# Patient Record
Sex: MALE | Race: WHITE | Employment: OTHER | URBAN - METROPOLITAN AREA
[De-identification: names, ages, dates, MRNs, and addresses within clinical notes are randomized per-mention and may not be internally consistent; named-entity substitution may affect disease eponyms.]

---

## 2017-04-01 ENCOUNTER — APPOINTMENT (OUTPATIENT)
Dept: GENERAL RADIOLOGY | Age: 77
End: 2017-04-01
Attending: STUDENT IN AN ORGANIZED HEALTH CARE EDUCATION/TRAINING PROGRAM
Payer: MEDICARE

## 2017-04-01 ENCOUNTER — HOSPITAL ENCOUNTER (EMERGENCY)
Age: 77
Discharge: HOME | End: 2017-04-01
Attending: STUDENT IN AN ORGANIZED HEALTH CARE EDUCATION/TRAINING PROGRAM
Payer: MEDICARE

## 2017-04-01 VITALS
BODY MASS INDEX: 16.3 KG/M2 | HEART RATE: 54 BPM | OXYGEN SATURATION: 98 % | TEMPERATURE: 98.7 F | HEIGHT: 73 IN | DIASTOLIC BLOOD PRESSURE: 49 MMHG | WEIGHT: 123 LBS | SYSTOLIC BLOOD PRESSURE: 123 MMHG | RESPIRATION RATE: 14 BRPM

## 2017-04-01 DIAGNOSIS — R07.9 CHEST PAIN, UNSPECIFIED TYPE: Primary | ICD-10-CM

## 2017-04-01 LAB
ALBUMIN SERPL BCP-MCNC: 3.5 G/DL (ref 3.5–5)
ALBUMIN/GLOB SERPL: 0.9 {RATIO} (ref 1.1–2.2)
ALP SERPL-CCNC: 59 U/L (ref 45–117)
ALT SERPL-CCNC: 18 U/L (ref 12–78)
ANION GAP BLD CALC-SCNC: 8 MMOL/L (ref 5–15)
AST SERPL W P-5'-P-CCNC: 15 U/L (ref 15–37)
ATRIAL RATE: 241 BPM
BASOPHILS # BLD AUTO: 0 K/UL (ref 0–0.1)
BASOPHILS # BLD: 0 % (ref 0–1)
BILIRUB SERPL-MCNC: 1.6 MG/DL (ref 0.2–1)
BUN SERPL-MCNC: 18 MG/DL (ref 6–20)
BUN/CREAT SERPL: 17 (ref 12–20)
CALCIUM SERPL-MCNC: 9.1 MG/DL (ref 8.5–10.1)
CALCULATED R AXIS, ECG10: 35 DEGREES
CALCULATED T AXIS, ECG11: 63 DEGREES
CHLORIDE SERPL-SCNC: 106 MMOL/L (ref 97–108)
CO2 SERPL-SCNC: 30 MMOL/L (ref 21–32)
CREAT SERPL-MCNC: 1.04 MG/DL (ref 0.7–1.3)
DIAGNOSIS, 93000: NORMAL
EOSINOPHIL # BLD: 0 K/UL (ref 0–0.4)
EOSINOPHIL NFR BLD: 0 % (ref 0–7)
ERYTHROCYTE [DISTWIDTH] IN BLOOD BY AUTOMATED COUNT: 14.3 % (ref 11.5–14.5)
GLOBULIN SER CALC-MCNC: 4.1 G/DL (ref 2–4)
GLUCOSE SERPL-MCNC: 100 MG/DL (ref 65–100)
HCT VFR BLD AUTO: 39.6 % (ref 36.6–50.3)
HGB BLD-MCNC: 13.2 G/DL (ref 12.1–17)
LYMPHOCYTES # BLD AUTO: 6 % (ref 12–49)
LYMPHOCYTES # BLD: 0.5 K/UL (ref 0.8–3.5)
MCH RBC QN AUTO: 31.3 PG (ref 26–34)
MCHC RBC AUTO-ENTMCNC: 33.3 G/DL (ref 30–36.5)
MCV RBC AUTO: 93.8 FL (ref 80–99)
MONOCYTES # BLD: 0.6 K/UL (ref 0–1)
MONOCYTES NFR BLD AUTO: 7 % (ref 5–13)
NEUTS BAND NFR BLD MANUAL: 2 %
NEUTS SEG # BLD: 7.5 K/UL (ref 1.8–8)
NEUTS SEG NFR BLD AUTO: 85 % (ref 32–75)
PLATELET # BLD AUTO: 195 K/UL (ref 150–400)
PLATELET COMMENTS,PCOM: ABNORMAL
POTASSIUM SERPL-SCNC: 3.9 MMOL/L (ref 3.5–5.1)
PROT SERPL-MCNC: 7.6 G/DL (ref 6.4–8.2)
Q-T INTERVAL, ECG07: 384 MS
QRS DURATION, ECG06: 94 MS
QTC CALCULATION (BEZET), ECG08: 411 MS
RBC # BLD AUTO: 4.22 M/UL (ref 4.1–5.7)
RBC MORPH BLD: ABNORMAL
RBC MORPH BLD: ABNORMAL
SODIUM SERPL-SCNC: 144 MMOL/L (ref 136–145)
TROPONIN I SERPL-MCNC: <0.04 NG/ML
TROPONIN I SERPL-MCNC: <0.04 NG/ML
VENTRICULAR RATE, ECG03: 69 BPM
WBC # BLD AUTO: 8.6 K/UL (ref 4.1–11.1)

## 2017-04-01 PROCEDURE — 99285 EMERGENCY DEPT VISIT HI MDM: CPT

## 2017-04-01 PROCEDURE — 85025 COMPLETE CBC W/AUTO DIFF WBC: CPT | Performed by: STUDENT IN AN ORGANIZED HEALTH CARE EDUCATION/TRAINING PROGRAM

## 2017-04-01 PROCEDURE — 80053 COMPREHEN METABOLIC PANEL: CPT | Performed by: STUDENT IN AN ORGANIZED HEALTH CARE EDUCATION/TRAINING PROGRAM

## 2017-04-01 PROCEDURE — 84484 ASSAY OF TROPONIN QUANT: CPT | Performed by: STUDENT IN AN ORGANIZED HEALTH CARE EDUCATION/TRAINING PROGRAM

## 2017-04-01 PROCEDURE — 93005 ELECTROCARDIOGRAM TRACING: CPT

## 2017-04-01 PROCEDURE — 36415 COLL VENOUS BLD VENIPUNCTURE: CPT | Performed by: STUDENT IN AN ORGANIZED HEALTH CARE EDUCATION/TRAINING PROGRAM

## 2017-04-01 PROCEDURE — 71010 XR CHEST PORT: CPT

## 2017-04-01 NOTE — ED NOTES
Verbal and bedside report given to [id number] [facility] from Jossy Rice RN using [employer]. Patient resting quietly with no further needs at this time.

## 2017-04-01 NOTE — ED NOTES
Assumed care of patient. Denies CP currently. Reports \"my head feels like its stretching. A really bad headache. \"  Also reports epigastric \"burning\" and pain \"in my tailbone. \" Pt's head is diaphoretic. Female  at bedside. Call bell in reach. Warm blanket provided. Railings up x 2.   On CM x3

## 2017-04-01 NOTE — DISCHARGE INSTRUCTIONS
We hope that we have addressed all of your medical concerns. The examination and treatment you received in the Emergency Department were for an emergent problem and were not intended as complete care. It is important that you follow up with your healthcare provider(s) for ongoing care. If your symptoms worsen or do not improve as expected, and you are unable to reach your usual health care provider(s), you should return to the Emergency Department. Today's healthcare is undergoing tremendous change, and patient satisfaction surveys are one of the many tools to assess the quality of medical care. You may receive a survey from the [employer] regarding your experience in the Emergency Department. I hope that your experience has been completely positive, particularly the medical care that I provided. As such, please participate in the survey; anything less than excellent does not meet my expectations or intentions. Critical access hospital[facility] and 8 [facility] participate in nationally recognized quality of care measures. If your blood pressure is greater than 120/80, as reported below, we urge that you seek medical care to address the potential of high blood pressure, commonly known as hypertension. Hypertension can be hereditary or can be caused by certain medical conditions, pain, stress, or \"white coat syndrome. \"       Please make an appointment with your health care provider(s) for follow up of your Emergency Department visit. VITALS:   Patient Vitals for the past 8 hrs:   Temp Pulse Resp BP SpO2   04/01/17 0700 - 66 17 157/72 100 %   04/01/17 0600 - (!) 56 15 146/72 97 %   04/01/17 0453 98.7 °F (37.1 °C) 71 15 158/61 97 %          Thank you for allowing us to provide you with medical care today. We realize that you have many choices for your emergency care needs. Please choose us in the future for any continued health care needs.       Regards, Sandra Braxton [place], [address] [place] [address] 20.   Office: [phone]            Recent Results (from the past 24 hour(s))   EKG, 12 LEAD, INITIAL    Collection Time: 04/01/17  4:55 AM   Result Value Ref Range    Ventricular Rate 69 BPM    Atrial Rate 241 BPM    QRS Duration 94 ms    Q-T Interval 384 ms    QTC Calculation (Bezet) 411 ms    Calculated R Axis 35 degrees    Calculated T Axis 63 degrees    Diagnosis       Accelerated Junctional rhythm  Voltage criteria for left ventricular hypertrophy  When compared with ECG of 07-NOV-2016 17:32,  Junctional rhythm has replaced Sinus rhythm     CBC WITH AUTOMATED DIFF    Collection Time: 04/01/17  5:01 AM   Result Value Ref Range    WBC 8.6 4.1 - 11.1 K/uL    RBC 4.22 4.10 - 5.70 M/uL    HGB 13.2 12.1 - 17.0 g/dL    HCT 39.6 36.6 - 50.3 %    MCV 93.8 80.0 - 99.0 FL    MCH 31.3 26.0 - 34.0 PG    MCHC 33.3 30.0 - 36.5 g/dL    RDW 14.3 11.5 - 14.5 %    PLATELET 044 012 - 591 K/uL    NEUTROPHILS 85 (H) 32 - 75 %    BAND NEUTROPHILS 2 %    LYMPHOCYTES 6 (L) 12 - 49 %    MONOCYTES 7 5 - 13 %    EOSINOPHILS 0 0 - 7 %    BASOPHILS 0 0 - 1 %    ABS. NEUTROPHILS 7.5 1.8 - 8.0 K/UL    ABS. LYMPHOCYTES 0.5 (L) 0.8 - 3.5 K/UL    ABS. MONOCYTES 0.6 0.0 - 1.0 K/UL    ABS. EOSINOPHILS 0.0 0.0 - 0.4 K/UL    ABS.  BASOPHILS 0.0 0.0 - 0.1 K/UL    PLATELET COMMENTS LARGE PLATELETS      RBC COMMENTS NATACHA CELLS  PRESENT        RBC COMMENTS ANISOCYTOSIS  1+       METABOLIC PANEL, COMPREHENSIVE    Collection Time: 04/01/17  5:01 AM   Result Value Ref Range    Sodium 144 136 - 145 mmol/L    Potassium 3.9 3.5 - 5.1 mmol/L    Chloride 106 97 - 108 mmol/L    CO2 30 21 - 32 mmol/L    Anion gap 8 5 - 15 mmol/L    Glucose 100 65 - 100 mg/dL    BUN 18 6 - 20 MG/DL    Creatinine 1.04 0.70 - 1.30 MG/DL    BUN/Creatinine ratio 17 12 - 20      GFR est AA >60 >60 ml/min/1.73m2    GFR est non-AA >60 >60 ml/min/1.73m2    Calcium 9.1 8.5 - 10.1 MG/DL    Bilirubin, total 1.6 (H) 0.2 - 1.0 MG/DL    ALT (SGPT) 18 12 - 78 U/L    AST (SGOT) 15 15 - 37 U/L    Alk. phosphatase 59 45 - 117 U/L    Protein, total 7.6 6.4 - 8.2 g/dL    Albumin 3.5 3.5 - 5.0 g/dL    Globulin 4.1 (H) 2.0 - 4.0 g/dL    A-G Ratio 0.9 (L) 1.1 - 2.2     TROPONIN I    Collection Time: 04/01/17  5:01 AM   Result Value Ref Range    Troponin-I, Qt. <0.04 <0.05 ng/mL       Xr Chest Port    Result Date: 4/1/2017  Chest portable AP History: Chest pain radiating to jaw Comparison: 11/7/2016 Findings:  The patient is status post median sternotomy and CABG. The lungs are well expanded. No focal consolidation, pleural effusion, or pneumothorax. The cardiomediastinal silhouette is unremarkable. The visualized osseous structures are unremarkable. Impression: No acute cardiopulmonary process. Chest Pain: Care Instructions  Your Care Instructions  There are many things that can cause chest pain. Some are not serious and will get better on their own in a few days. But some kinds of chest pain need more testing and treatment. Your doctor may have recommended a follow-up visit in the next 8 to 12 hours. If you are not getting better, you may need more tests or treatment. Even though your doctor has released you, you still need to watch for any problems. The doctor carefully checked you, but sometimes problems can develop later. If you have new symptoms or if your symptoms do not get better, get medical care right away. If you have worse or different chest pain or pressure that lasts more than 5 minutes or you passed out (lost consciousness), call 911 or seek other emergency help right away. A medical visit is only one step in your treatment. Even if you feel better, you still need to do what your doctor recommends, such as going to all suggested follow-up appointments and taking medicines exactly as directed. This will help you recover and help prevent future problems. How can you care for yourself at home?   · Rest until you feel better. · Take your medicine exactly as prescribed. Call your doctor if you think you are having a problem with your medicine. · Do not drive after taking a prescription pain medicine. When should you call for help? Call 911 if:  · You passed out (lost consciousness). · You have severe difficulty breathing. · You have symptoms of a heart attack. These may include:  ¨ Chest pain or pressure, or a strange feeling in your chest.  ¨ Sweating. ¨ Shortness of breath. ¨ Nausea or vomiting. ¨ Pain, pressure, or a strange feeling in your back, neck, jaw, or upper belly or in one or both shoulders or arms. ¨ Lightheadedness or sudden weakness. ¨ A fast or irregular heartbeat. After you call 911, the  may tell you to chew 1 adult-strength or 2 to 4 low-dose aspirin. Wait for an ambulance. Do not try to drive yourself. Call your doctor today if:  · You have any trouble breathing. · Your chest pain gets worse. · You are dizzy or lightheaded, or you feel like you may faint. · You are not getting better as expected. · You are having new or different chest pain. Where can you learn more? Go to http://addie-zbigniew.info/. Enter A120 in the search box to learn more about \"Chest Pain: Care Instructions. \"  Current as of: May 27, 2016  Content Version: 11.2  © 7054-5037 [employer]. Care instructions adapted under license by [employer] (which disclaims liability or warranty for this information). If you have questions about a medical condition or this instruction, always ask your healthcare professional. [address] any warranty or liability for your use of this information.

## 2017-04-01 NOTE — ED TRIAGE NOTES
Patient arrived via EMS from home reporting burning chest pain radiating to his jaw. Patient reports pain woke him from his sleep. Patient was administered 325 of ASA en route.

## 2017-04-01 NOTE — ED PROVIDER NOTES
HPI Comments: Mr. Tigre Gonzalez is a 69 y/o male presenting to ED after being woken up this AM with CP and SOB. Pt describes the pain as burning pain (pointing to epigastric region). He states that the symptoms woke him up and he is not having symptoms any more upon arrival.  He denies any recent illness, abd pain, n/v, fevers. Patient is a 68 y.o. male presenting with chest pain. The history is provided by the patient and the spouse. Chest Pain    Associated symptoms include shortness of breath. Pertinent negatives include no abdominal pain, no cough, no diaphoresis, no dizziness, no fever, no headaches, no nausea, no numbness, no palpitations, no vomiting and no weakness. Past Medical History:   Diagnosis Date    CAD (coronary artery disease)     Hypercholesteremia     Osteoporosis     Parkinson disease ([address]        Past Surgical History:   Procedure Laterality Date    CARDIAC SURG PROCEDURE UNLIST      bipass x 2         History reviewed. No pertinent family history. Social History     Social History    Marital status:      Spouse name: N/A    Number of children: N/A    Years of education: N/A     Occupational History    Not on file. Social History Main Topics    Smoking status: Former Smoker    Smokeless tobacco: Not on file    Alcohol use No    Drug use: No    Sexual activity: Not on file     Other Topics Concern    Not on file     Social History Narrative         ALLERGIES: Review of patient's allergies indicates no known allergies. Review of Systems   Constitutional: Negative for chills, diaphoresis, fatigue and fever. HENT: Negative for congestion, rhinorrhea, sinus pressure, sore throat, trouble swallowing and voice change. Eyes: Negative for photophobia and visual disturbance. Respiratory: Positive for shortness of breath. Negative for cough and chest tightness. Cardiovascular: Positive for chest pain. Negative for palpitations and leg swelling. Gastrointestinal: Negative for abdominal pain, blood in stool, constipation, diarrhea, nausea and vomiting. Musculoskeletal: Negative for arthralgias, myalgias and neck pain. Neurological: Negative for dizziness, weakness, light-headedness, numbness and headaches. Vitals:    04/01/17 0453 04/01/17 0600   BP: 158/61 96/42   Pulse: 71 (!) 56   Resp: 15 15   Temp: 98.7 °F (37.1 °C)    SpO2: 97% 97%   Weight: 55.8 kg (123 lb)    Height: 6' 1\" (1.854 m)             Physical Exam   Constitutional: He is oriented to person, place, and time. He appears well-developed and well-nourished. No distress. HENT:   Head: Normocephalic and atraumatic. Nose: Nose normal.   Mouth/Throat: Oropharynx is clear and moist. No oropharyngeal exudate. Eyes: Conjunctivae and EOM are normal. Right eye exhibits no discharge. Left eye exhibits no discharge. No scleral icterus. Neck: Normal range of motion. Neck supple. No JVD present. No tracheal deviation present. No thyromegaly present. Cardiovascular: Normal rate, regular rhythm, normal heart sounds and intact distal pulses. Exam reveals no gallop and no friction rub. No murmur heard. Pulmonary/Chest: Effort normal and breath sounds normal. No stridor. No respiratory distress. He has no wheezes. He has no rales. He exhibits no tenderness. Abdominal: Bowel sounds are normal. He exhibits no distension and no mass. There is no tenderness. There is no rebound. Musculoskeletal: Normal range of motion. He exhibits no edema or tenderness. Lymphadenopathy:     He has no cervical adenopathy. Neurological: He is alert and oriented to person, place, and time. No cranial nerve deficit. Coordination normal.   Skin: Skin is warm and dry. No rash noted. He is not diaphoretic. No erythema. No pallor. Psychiatric: He has a normal mood and affect. His behavior is normal. Judgment and thought content normal.   Nursing note and vitals reviewed.        MDM  Number of Diagnoses or Management Options  Diagnosis management comments: ACS, GERD, dyspnea, PNA. 69 y/o male with sudden onset CP, SOB. Will eval with cbc, cmp, ce, ecg, cxr. Will reasess.        Amount and/or Complexity of Data Reviewed  Clinical lab tests: ordered and reviewed  Tests in the radiology section of CPT®: ordered and reviewed  Obtain history from someone other than the patient: yes  Review and summarize past medical records: yes    Risk of Complications, Morbidity, and/or Mortality  Presenting problems: moderate  Diagnostic procedures: moderate  Management options: moderate    Patient Progress  Patient progress: stable    ED Course       Procedures

## 2017-04-01 NOTE — PROGRESS NOTES
Prior to Admission Medications   Prescriptions Last Dose Informant Patient Reported? Taking? Calcium-Cholecalciferol, D3, 600 mg(1,500mg) -400 unit cap 3/31/2017 at 1700  Yes Yes   Sig: Take 1 Cap by mouth two (2) times a day. MULTIVIT-MIN/FA/LYCOPEN/LUTEIN (CENTRUM SILVER ULTRA MEN'S PO) 3/31/2017 at 0930  Yes Yes   Sig: Take 1 Tab by mouth daily. alendronate (FOSAMAX) 70 mg tablet 3/25/2017 at Unknown time  Yes Yes   Sig: Take 70 mg by mouth Every Saturday. aspirin 81 mg tablet 3/31/2017 at 2200  Yes Yes   Sig: Take 81 mg by mouth nightly. carbidopa-levodopa CR (SINEMET CR)  mg per tablet 3/31/2017 at 1830  Yes Yes   Sig: Take 0.5 Tabs by mouth three (3) times daily. latanoprost (XALATAN) 0.005 % ophthalmic solution 3/31/2017 at 2200  Yes Yes   Sig: Administer 1 Drop to both eyes nightly. pimavanserin (NUPLAZID) 17 mg tab 3/31/2017 at 1330  Yes Yes   Sig: Take 2 Tabs by mouth daily. polyethylene glycol (MIRALAX) 17 gram/dose powder 3/31/2017 at 2200  Yes Yes   Sig: Take 17 g by mouth nightly. Facility-Administered Medications: None   Self: List updated per patient and spouse interview and prescription bottles. Protonix removed and sinemet dosage adjusted.

## 2017-04-01 NOTE — ED NOTES
Pt continues to sleep soundly. VSS. Pt to have repeat labs drawn at 0800.   Pt and female  updated on plan of care

## 2017-12-15 ENCOUNTER — APPOINTMENT (OUTPATIENT)
Dept: GENERAL RADIOLOGY | Age: 77
DRG: 981 | End: 2017-12-15
Attending: EMERGENCY MEDICINE
Payer: MEDICARE

## 2017-12-15 ENCOUNTER — APPOINTMENT (OUTPATIENT)
Dept: CT IMAGING | Age: 77
DRG: 981 | End: 2017-12-15
Attending: EMERGENCY MEDICINE
Payer: MEDICARE

## 2017-12-15 ENCOUNTER — HOSPITAL ENCOUNTER (INPATIENT)
Age: 77
LOS: 20 days | Discharge: NURSING, REHAB OR LONG-TERM CARE | DRG: 981 | End: 2018-01-04
Attending: EMERGENCY MEDICINE | Admitting: HOSPITALIST
Payer: MEDICARE

## 2017-12-15 DIAGNOSIS — R53.81 PHYSICAL DEBILITY: ICD-10-CM

## 2017-12-15 DIAGNOSIS — R49.0 DYSPHONIA: ICD-10-CM

## 2017-12-15 DIAGNOSIS — R13.10 DYSPHAGIA, UNSPECIFIED TYPE: ICD-10-CM

## 2017-12-15 DIAGNOSIS — R29.898 RIGIDITY: ICD-10-CM

## 2017-12-15 DIAGNOSIS — G20 PARKINSON DISEASE (HCC): Chronic | ICD-10-CM

## 2017-12-15 DIAGNOSIS — R53.1 WEAKNESS: ICD-10-CM

## 2017-12-15 DIAGNOSIS — G93.41 METABOLIC ENCEPHALOPATHY: ICD-10-CM

## 2017-12-15 DIAGNOSIS — R41.0 DISORIENTATION: Primary | ICD-10-CM

## 2017-12-15 DIAGNOSIS — R64 CACHEXIA (HCC): ICD-10-CM

## 2017-12-15 DIAGNOSIS — Z74.01 BED CONFINEMENT STATUS: ICD-10-CM

## 2017-12-15 DIAGNOSIS — E87.0 HYPERNATREMIA: ICD-10-CM

## 2017-12-15 LAB
ALBUMIN SERPL-MCNC: 2 G/DL (ref 3.5–5)
ALBUMIN/GLOB SERPL: 0.4 {RATIO} (ref 1.1–2.2)
ALP SERPL-CCNC: 83 U/L (ref 45–117)
ALT SERPL-CCNC: 152 U/L (ref 12–78)
AMMONIA PLAS-SCNC: 36 UMOL/L
ANION GAP SERPL CALC-SCNC: 8 MMOL/L (ref 5–15)
AST SERPL-CCNC: 87 U/L (ref 15–37)
BASOPHILS # BLD: 0 K/UL (ref 0–0.1)
BASOPHILS NFR BLD: 0 % (ref 0–1)
BILIRUB SERPL-MCNC: 0.3 MG/DL (ref 0.2–1)
BNP SERPL-MCNC: 1343 PG/ML (ref 0–450)
BUN SERPL-MCNC: 31 MG/DL (ref 6–20)
BUN/CREAT SERPL: 35 (ref 12–20)
CALCIUM SERPL-MCNC: 8.9 MG/DL (ref 8.5–10.1)
CHLORIDE SERPL-SCNC: 119 MMOL/L (ref 97–108)
CO2 SERPL-SCNC: 30 MMOL/L (ref 21–32)
CREAT SERPL-MCNC: 0.89 MG/DL (ref 0.7–1.3)
DIFFERENTIAL METHOD BLD: ABNORMAL
EOSINOPHIL # BLD: 0.1 K/UL (ref 0–0.4)
EOSINOPHIL NFR BLD: 1 % (ref 0–7)
ERYTHROCYTE [DISTWIDTH] IN BLOOD BY AUTOMATED COUNT: 14.9 % (ref 11.5–14.5)
GLOBULIN SER CALC-MCNC: 4.9 G/DL (ref 2–4)
GLUCOSE SERPL-MCNC: 133 MG/DL (ref 65–100)
HCT VFR BLD AUTO: 40 % (ref 36.6–50.3)
HGB BLD-MCNC: 12.9 G/DL (ref 12.1–17)
LACTATE SERPL-SCNC: 1.7 MMOL/L (ref 0.4–2)
LYMPHOCYTES # BLD: 0.6 K/UL (ref 0.8–3.5)
LYMPHOCYTES NFR BLD: 7 % (ref 12–49)
MCH RBC QN AUTO: 31.3 PG (ref 26–34)
MCHC RBC AUTO-ENTMCNC: 32.3 G/DL (ref 30–36.5)
MCV RBC AUTO: 97.1 FL (ref 80–99)
MONOCYTES # BLD: 0.2 K/UL (ref 0–1)
MONOCYTES NFR BLD: 3 % (ref 5–13)
NEUTS SEG # BLD: 7 K/UL (ref 1.8–8)
NEUTS SEG NFR BLD: 89 % (ref 32–75)
PLATELET # BLD AUTO: 206 K/UL (ref 150–400)
POTASSIUM SERPL-SCNC: 3.7 MMOL/L (ref 3.5–5.1)
PROT SERPL-MCNC: 6.9 G/DL (ref 6.4–8.2)
RBC # BLD AUTO: 4.12 M/UL (ref 4.1–5.7)
RBC MORPH BLD: ABNORMAL
SODIUM SERPL-SCNC: 157 MMOL/L (ref 136–145)
TROPONIN I SERPL-MCNC: <0.04 NG/ML
WBC # BLD AUTO: 7.9 K/UL (ref 4.1–11.1)

## 2017-12-15 PROCEDURE — 80048 BASIC METABOLIC PNL TOTAL CA: CPT | Performed by: HOSPITALIST

## 2017-12-15 PROCEDURE — 84484 ASSAY OF TROPONIN QUANT: CPT | Performed by: EMERGENCY MEDICINE

## 2017-12-15 PROCEDURE — 74011000250 HC RX REV CODE- 250: Performed by: HOSPITALIST

## 2017-12-15 PROCEDURE — 82533 TOTAL CORTISOL: CPT | Performed by: HOSPITALIST

## 2017-12-15 PROCEDURE — 74011250637 HC RX REV CODE- 250/637: Performed by: HOSPITALIST

## 2017-12-15 PROCEDURE — 99283 EMERGENCY DEPT VISIT LOW MDM: CPT

## 2017-12-15 PROCEDURE — 36415 COLL VENOUS BLD VENIPUNCTURE: CPT | Performed by: HOSPITALIST

## 2017-12-15 PROCEDURE — 83880 ASSAY OF NATRIURETIC PEPTIDE: CPT | Performed by: EMERGENCY MEDICINE

## 2017-12-15 PROCEDURE — 83605 ASSAY OF LACTIC ACID: CPT | Performed by: EMERGENCY MEDICINE

## 2017-12-15 PROCEDURE — 80053 COMPREHEN METABOLIC PANEL: CPT | Performed by: EMERGENCY MEDICINE

## 2017-12-15 PROCEDURE — 74011250636 HC RX REV CODE- 250/636: Performed by: HOSPITALIST

## 2017-12-15 PROCEDURE — 70450 CT HEAD/BRAIN W/O DYE: CPT

## 2017-12-15 PROCEDURE — 65660000000 HC RM CCU STEPDOWN

## 2017-12-15 PROCEDURE — 85025 COMPLETE CBC W/AUTO DIFF WBC: CPT | Performed by: EMERGENCY MEDICINE

## 2017-12-15 PROCEDURE — 71010 XR CHEST SNGL V: CPT

## 2017-12-15 PROCEDURE — 82140 ASSAY OF AMMONIA: CPT | Performed by: HOSPITALIST

## 2017-12-15 PROCEDURE — 74011250637 HC RX REV CODE- 250/637: Performed by: FAMILY MEDICINE

## 2017-12-15 RX ORDER — SODIUM CHLORIDE 0.9 % (FLUSH) 0.9 %
5-10 SYRINGE (ML) INJECTION EVERY 8 HOURS
Status: DISCONTINUED | OUTPATIENT
Start: 2017-12-15 | End: 2018-01-04 | Stop reason: HOSPADM

## 2017-12-15 RX ORDER — ACETAMINOPHEN 650 MG/1
650 SUPPOSITORY RECTAL
Status: DISCONTINUED | OUTPATIENT
Start: 2017-12-15 | End: 2018-01-04 | Stop reason: HOSPADM

## 2017-12-15 RX ORDER — POLYETHYLENE GLYCOL 3350 17 G/17G
17 POWDER, FOR SOLUTION ORAL
Status: DISCONTINUED | OUTPATIENT
Start: 2017-12-16 | End: 2018-01-04 | Stop reason: HOSPADM

## 2017-12-15 RX ORDER — LATANOPROST 50 UG/ML
1 SOLUTION/ DROPS OPHTHALMIC
Status: DISCONTINUED | OUTPATIENT
Start: 2017-12-15 | End: 2018-01-04 | Stop reason: HOSPADM

## 2017-12-15 RX ORDER — CARBIDOPA AND LEVODOPA 25; 100 MG/1; MG/1
0.5 TABLET, EXTENDED RELEASE ORAL 3 TIMES DAILY
Status: CANCELLED | OUTPATIENT
Start: 2017-12-15

## 2017-12-15 RX ORDER — GUAIFENESIN 100 MG/5ML
81 LIQUID (ML) ORAL
Status: DISCONTINUED | OUTPATIENT
Start: 2017-12-15 | End: 2018-01-04 | Stop reason: HOSPADM

## 2017-12-15 RX ORDER — SODIUM CHLORIDE 0.9 % (FLUSH) 0.9 %
5-10 SYRINGE (ML) INJECTION AS NEEDED
Status: DISCONTINUED | OUTPATIENT
Start: 2017-12-15 | End: 2018-01-04 | Stop reason: HOSPADM

## 2017-12-15 RX ORDER — ALENDRONATE SODIUM 70 MG/1
70 TABLET ORAL
Status: DISCONTINUED | OUTPATIENT
Start: 2017-12-16 | End: 2017-12-15

## 2017-12-15 RX ORDER — ACETAMINOPHEN 160 MG/5ML
LIQUID ORAL
COMMUNITY

## 2017-12-15 RX ORDER — ACETAMINOPHEN 325 MG/1
650 TABLET ORAL
Status: DISCONTINUED | OUTPATIENT
Start: 2017-12-15 | End: 2018-01-04 | Stop reason: HOSPADM

## 2017-12-15 RX ORDER — FLUDROCORTISONE ACETATE 0.1 MG/1
0.2 TABLET ORAL DAILY
COMMUNITY

## 2017-12-15 RX ORDER — PANTOPRAZOLE SODIUM 20 MG/1
20 TABLET, DELAYED RELEASE ORAL DAILY
COMMUNITY

## 2017-12-15 RX ORDER — RIVASTIGMINE 9.5 MG/24H
1 PATCH, EXTENDED RELEASE TRANSDERMAL DAILY
COMMUNITY

## 2017-12-15 RX ORDER — DEXTROSE MONOHYDRATE 50 MG/ML
125 INJECTION, SOLUTION INTRAVENOUS CONTINUOUS
Status: DISCONTINUED | OUTPATIENT
Start: 2017-12-15 | End: 2017-12-17

## 2017-12-15 RX ORDER — RIVASTIGMINE 9.5 MG/24H
1 PATCH, EXTENDED RELEASE TRANSDERMAL DAILY
Status: DISCONTINUED | OUTPATIENT
Start: 2017-12-15 | End: 2018-01-04 | Stop reason: HOSPADM

## 2017-12-15 RX ADMIN — LATANOPROST 1 DROP: 50 SOLUTION OPHTHALMIC at 23:15

## 2017-12-15 RX ADMIN — ASPIRIN 81 MG 81 MG: 81 TABLET ORAL at 21:24

## 2017-12-15 RX ADMIN — Medication 10 ML: at 21:16

## 2017-12-15 RX ADMIN — DEXTROSE MONOHYDRATE 75 ML/HR: 5 INJECTION, SOLUTION INTRAVENOUS at 21:16

## 2017-12-15 NOTE — ED NOTES
TRANSFER - OUT REPORT:    Verbal report given to Yovanny Alfredo RN(name) on Jean Gotti  being transferred to [facility](unit) for routine progression of care       Report consisted of patients Situation, Background, Assessment and   Recommendations(SBAR). Information from the following report(s) SBAR, ED Summary, Procedure Summary, MAR and Recent Results was reviewed with the receiving nurse. Lines:   Peripheral IV 12/15/17 Right Wrist (Active)   Site Assessment Clean, dry, & intact 12/15/2017  3:19 PM   Phlebitis Assessment 0 12/15/2017  3:19 PM   Infiltration Assessment 0 12/15/2017  3:19 PM   Dressing Status Clean, dry, & intact 12/15/2017  3:19 PM   Hub Color/Line Status Pink 12/15/2017  3:19 PM        Opportunity for questions and clarification was provided.       Patient transported with:   [employer]

## 2017-12-15 NOTE — ED TRIAGE NOTES
Triage note: pt had blood work done on 12/11. Today results are back NA - 157, wife reports he had not been walking for 3 weeks.

## 2017-12-15 NOTE — Clinical Note
Status[de-identified] Inpatient [101] Type of Bed: Neuro/Stroke [9] Inpatient Hospitalization Certified Necessary for the Following Reasons: 3. Patient receiving treatment that can only be provided in an inpatient setting (further clarification in H&P documentation) Admitting Diagnosis: Weakness [[id number]] Admitting Physician: Jaleel Moya [[id number]] Attending Physician: Ronda Richardson Estimated Length of Stay: 2 Midnights Discharge Plan[de-identified] Home with Office Follow-up

## 2017-12-15 NOTE — ED PROVIDER NOTES
HPI Comments: 68 y.o. male with past medical history significant for hypercholesteremia, osteoporosis, CAD, Parkinson disease, cardiac surgery, and prostatectomy who presents from home with chief complaint of weakness. Pt arrives after increased weakness, weight loss, and decreased activity per his wife over the past several weeks. Pt had blood drawn 4 days ago and results returned today which showed his Na was 152. Pt's wife has been feeding him ensure and peanut butter, and states he drinks water and some orange juice every day. Wife claims he's normally active up until the past 3 weeks, and typically talkative in the evening and at meals, but hasn't been as talkative lately although it does vary. Pt has also had a low grade fever, and hasn't walked in nearly 3 weeks. Pt denies any cough. There are no other acute medical concerns at this time. PCP: Agnieszka Quintero MD    Note written by Carson Martin, as dictated by Kannan Ferguson MD 2:49 PM      The history is provided by the patient. No  was used. Past Medical History:   Diagnosis Date    CAD (coronary artery disease)     Hypercholesteremia     Osteoporosis     Parkinson disease [address] [address]        Past Surgical History:   Procedure Laterality Date    CARDIAC SURG PROCEDURE UNLIST      bipass x 2    HX PROSTATECTOMY           History reviewed. No pertinent family history. Social History     Social History    Marital status:      Spouse name: N/A    Number of children: N/A    Years of education: N/A     Occupational History    Not on file. Social History Main Topics    Smoking status: Former Smoker    Smokeless tobacco: Not on file    Alcohol use No    Drug use: No    Sexual activity: Not on file     Other Topics Concern    Not on file     Social History Narrative         ALLERGIES: Review of patient's allergies indicates no known allergies.     Review of Systems   Constitutional: Positive for fatigue (decreased activit) and unexpected weight change (weight loss). Negative for activity change, chills and fever. HENT: Negative for nosebleeds, sore throat, trouble swallowing and voice change. Eyes: Negative for visual disturbance. Respiratory: Negative for cough and shortness of breath. Cardiovascular: Negative for chest pain and palpitations. Gastrointestinal: Negative for abdominal pain, constipation, diarrhea and nausea. Genitourinary: Negative for difficulty urinating, dysuria, hematuria and urgency. Musculoskeletal: Positive for gait problem. Negative for back pain, neck pain and neck stiffness. Skin: Negative for color change. Allergic/Immunologic: Negative for immunocompromised state. Neurological: Positive for weakness. Negative for dizziness, seizures, syncope, light-headedness, numbness and headaches. Psychiatric/Behavioral: Negative for behavioral problems, confusion, hallucinations, self-injury and suicidal ideas. Decreased conversation with wife   All other systems reviewed and are negative. There were no vitals filed for this visit. Physical Exam   Constitutional: He is oriented to person, place, and time. He appears well-developed and well-nourished. No distress. HENT:   Head: Normocephalic and atraumatic. Mouth/Throat: Mucous membranes are dry. Eyes: Pupils are equal, round, and reactive to light. Neck: Normal range of motion. Neck supple. Cardiovascular: Regular rhythm and normal heart sounds. Exam reveals no gallop and no friction rub. No murmur heard. Tachycardic   Pulmonary/Chest: Effort normal and breath sounds normal. No respiratory distress. He has no wheezes. Abdominal: Soft. Bowel sounds are normal. He exhibits no distension. There is no tenderness. There is no rebound and no guarding. Musculoskeletal: Normal range of motion. Neurological: He is alert and oriented to person, place, and time. Skin: Skin is warm.  No rash noted. He is not diaphoretic. Psychiatric: He has a normal mood and affect. His behavior is normal. Judgment and thought content normal.   Nursing note and vitals reviewed. Note written by Carson Rosa, as dictated by César Montiel MD 4:04 PM      [facility]  ED Course   This is a 59-year-old male with past medical history, review of systems, physical exam as above presenting with complaints of treat activity, and abnormal lab values. Wife states it patient with history of Parkinson's, had labs drawn recently, they were contacted today for elevated sodium in the 150s. Wife states that in the context of Parkinson's disease, patient has waxing and waning levels of activity, however states that she feels he is eating and drinking adequately. Upon arrival patient awake, alert, soft-spoken, possible weak, with tacky mucous membranes, cachectic appearance, otherwise unremarkable exam.  Suspect dehydration given labs use, and underlying Parkinson's disease, however aspiration pneumonia, electrolyte abnormality, lab error remained differentials. Patient remains afebrile, without tachycardia. Plan to obtain CMP, CBC, UA, chest x-ray. We will make a disposition based the patient's diagnostics and response to therapy. Procedures    PROGRESS NOTE:  4:04 PM  Spoke with radiology who believes the patient may have had a stroke, mo lesions incompletely characterized. Recommends MRI for further characterization. CONSULT NOTE:  4:35 Anna Connolly MD spoke with Dr. Brendan Sellers, Consult for Hospitalist.  Discussed available diagnostic tests and clinical findings. He is in agreement with care plans as outlined.   Dr. Brendan Sellers will admit

## 2017-12-15 NOTE — IP AVS SNAPSHOT
[address] [facility] [address] [place] [address] 
[phone] Patient: Timmy Prasad MRN: [id number] [facility]:[id number]/[id number] You are allergic to the following No active allergies Recent Documentation Height Weight BMI Smoking Status 1.854 m 59.1 kg 17.2 kg/m2 Former Smoker Emergency Contacts  (Rel.) Home Phone Work Phone Mobile Phone Hugo Staton [phone] -- [phone] Luna Fish -- -- [phone] About your hospitalization You were admitted on:  December 15, 2017 You last received care in the:  [facility] You were discharged on:  January 4, 2018 Why you were hospitalized Your primary diagnosis was:  Weakness Your diagnoses also included:  Parkinson Disease (Hcc), Metabolic Encephalopathy Providers Seen During Your Hospitalization Provider Specialty Primary office phone Shellie Ferrer MD Emergency Medicine [phone] Cristobal Rosales MD Internal Medicine [phone] Valerie Rodriguez MD Internal Medicine [phone] Nestor Perla MD Internal Medicine [phone] Zia cMkay MD Internal Medicine [phone] Charley Iqbal MD Internal Medicine [phone] Laverta Romberg, MD Internal Medicine [phone] Denice Saxena MD Internal Medicine [phone] Your Primary Care Physician (PCP) Primary Care Physician Office Phone Office Fax Jennifer Maxim [phone] Follow-up Information Follow up With Details Comments Contact Info Lanie Schuler MD   [address] [place] [address] [address] [place] [address] 
[phone] My Medications TAKE these medications as instructed Instructions Each Dose to Equal  
 Morning Noon Evening Bedtime  
 acetaminophen 160 mg/5 mL liquid Commonly known as:  TYLENOL  
   
 Your last dose was: Your next dose is: Take  by mouth every six (6) hours as needed for Pain. alendronate 70 mg tablet Commonly known as:  FOSAMAX Your last dose was: Your next dose is: Take 70 mg by mouth Every Saturday. 70 mg  
    
   
   
   
  
 aspirin 81 mg tablet Your last dose was: Your next dose is: Take 81 mg by mouth nightly. 81 mg Calcium-Cholecalciferol (D3) 600 mg(1,500mg) -400 unit Cap Your last dose was: Your next dose is: Take 1 Cap by mouth two (2) times a day. 1 Cap  
    
   
   
   
  
 carbidopa-levodopa  mg per tablet Commonly known as:  SINEMET Your last dose was: Your next dose is: Take 1.5 Tabs by mouth four (4) times daily. Indications: IDIOPATHIC PARKINSONISM  
 1.5 Tab CENTRUM SILVER ULTRA MEN'S PO Your last dose was: Your next dose is: Take 1 Tab by mouth daily. 1 Tab  
    
   
   
   
  
 fludrocortisone 0.1 mg tablet Commonly known as:  FLORINEF Your last dose was: Your next dose is: Take 0.2 mg by mouth daily. 0.2 mg  
    
   
   
   
  
 latanoprost 0.005 % ophthalmic solution Commonly known as:  Sunni Ernesto Your last dose was: Your next dose is:    
   
   
 Administer 1 Drop to both eyes nightly. 1 Drop MIRALAX 17 gram/dose powder Generic drug:  polyethylene glycol Your last dose was: Your next dose is: Take 17 g by mouth nightly. 17 g NUPLAZID 17 mg Tab Generic drug:  pimavanserin Your last dose was: Your next dose is: Take 2 Tabs by mouth daily. 2 Tab PROTONIX 20 mg tablet Generic drug:  pantoprazole Your last dose was: Your next dose is: Take 20 mg by mouth daily. 20 mg  
    
   
   
   
  
 rivastigmine 9.5 mg/24 hr patch Commonly known as:  EXELON Your last dose was: Your next dose is:    
   
   
 1 Patch by TransDERmal route daily. Applies at 1700  
 1 Patch Where to Get Your Medications These medications were sent to [facility]/pharmacy #[id number] Don Melendez, [address] [place] [id number] Phone:  [phone]  
  carbidopa-levodopa  mg per tablet Discharge Instructions None Discharge Orders None [employer] Announcement We are excited to announce that we are making your provider's discharge notes available to you in [employer]. You will see these notes when they are completed and signed by the physician that discharged you from your recent hospital stay. If you have any questions or concerns about any information you see in [employer], please call the Health Information Department where you were seen or reach out to your Primary Care Provider for more information about your plan of care. Introducing [facility]! [facility] introduces [employer] patient portal. Now you can access parts of your medical record, email your doctor's office, and request medication refills online. 1. In your internet browser, go to [email or web address] 2. Click on the First Time User? Click Here link in the Sign In box. You will see the New Member Sign Up page. 3. Enter your [employer] Access Code exactly as it appears below. You will not need to use this code after youve completed the sign-up process. If you do not sign up before the expiration date, you must request a new code. · [employer] Access Code: [email or web address] Expires: 3/11/2018  9:56 AM 
 
4.  Enter the last four digits of your Social Security Number (xxxx) and Date of Birth (mm/dd/yyyy) as indicated and click Submit. You will be taken to the next sign-up page. 5. Create a [employer] ID. This will be your [employer] login ID and cannot be changed, so think of one that is secure and easy to remember. 6. Create a [employer] password. You can change your password at any time. 7. Enter your Password Reset Question and Answer. This can be used at a later time if you forget your password. 8. Enter your e-mail address. You will receive e-mail notification when new information is available in [address] 9. Click Sign Up. You can now view and download portions of your medical record. 10. Click the Download Summary menu link to download a portable copy of your medical information. If you have questions, please visit the Frequently Asked Questions section of the [employer] website. Remember, [employer] is NOT to be used for urgent needs. For medical emergencies, dial 911. Now available from your iPhone and Android! General Information Please provide this summary of care documentation to your next provider. Patient Signature:  ____________________________________________________________ Date:  ____________________________________________________________  
  
[facility] Provider Signature:  ____________________________________________________________ Date:  ____________________________________________________________

## 2017-12-16 ENCOUNTER — APPOINTMENT (OUTPATIENT)
Dept: MRI IMAGING | Age: 77
DRG: 981 | End: 2017-12-16
Attending: HOSPITALIST
Payer: MEDICARE

## 2017-12-16 ENCOUNTER — APPOINTMENT (OUTPATIENT)
Dept: CT IMAGING | Age: 77
DRG: 981 | End: 2017-12-16
Attending: HOSPITALIST
Payer: MEDICARE

## 2017-12-16 LAB
AMMONIA PLAS-SCNC: 38 UMOL/L
ANION GAP SERPL CALC-SCNC: 5 MMOL/L (ref 5–15)
ANION GAP SERPL CALC-SCNC: 7 MMOL/L (ref 5–15)
BUN SERPL-MCNC: 25 MG/DL (ref 6–20)
BUN SERPL-MCNC: 27 MG/DL (ref 6–20)
BUN/CREAT SERPL: 31 (ref 12–20)
BUN/CREAT SERPL: 35 (ref 12–20)
CALCIUM SERPL-MCNC: 8.8 MG/DL (ref 8.5–10.1)
CALCIUM SERPL-MCNC: 8.9 MG/DL (ref 8.5–10.1)
CHLORIDE SERPL-SCNC: 119 MMOL/L (ref 97–108)
CHLORIDE SERPL-SCNC: 121 MMOL/L (ref 97–108)
CHOLEST SERPL-MCNC: 106 MG/DL
CO2 SERPL-SCNC: 32 MMOL/L (ref 21–32)
CO2 SERPL-SCNC: 32 MMOL/L (ref 21–32)
CORTIS AM PEAK SERPL-MCNC: 22 UG/DL (ref 4.3–22.4)
CREAT SERPL-MCNC: 0.77 MG/DL (ref 0.7–1.3)
CREAT SERPL-MCNC: 0.8 MG/DL (ref 0.7–1.3)
EST. AVERAGE GLUCOSE BLD GHB EST-MCNC: 114 MG/DL
GLUCOSE SERPL-MCNC: 123 MG/DL (ref 65–100)
GLUCOSE SERPL-MCNC: 132 MG/DL (ref 65–100)
HBA1C MFR BLD: 5.6 % (ref 4.2–6.3)
HDLC SERPL-MCNC: 26 MG/DL
HDLC SERPL: 4.1 {RATIO} (ref 0–5)
LDLC SERPL CALC-MCNC: 58.4 MG/DL (ref 0–100)
LIPID PROFILE,FLP: NORMAL
OSMOLALITY UR: 777 MOSM/KG H2O
POTASSIUM SERPL-SCNC: 3.4 MMOL/L (ref 3.5–5.1)
POTASSIUM SERPL-SCNC: 3.5 MMOL/L (ref 3.5–5.1)
SODIUM SERPL-SCNC: 158 MMOL/L (ref 136–145)
SODIUM SERPL-SCNC: 158 MMOL/L (ref 136–145)
SODIUM UR-SCNC: 24 MMOL/L
TRIGL SERPL-MCNC: 108 MG/DL (ref ?–150)
TSH SERPL DL<=0.05 MIU/L-ACNC: 1.68 UIU/ML (ref 0.36–3.74)
VLDLC SERPL CALC-MCNC: 21.6 MG/DL

## 2017-12-16 PROCEDURE — G8978 MOBILITY CURRENT STATUS: HCPCS

## 2017-12-16 PROCEDURE — 83935 ASSAY OF URINE OSMOLALITY: CPT | Performed by: INTERNAL MEDICINE

## 2017-12-16 PROCEDURE — 93306 TTE W/DOPPLER COMPLETE: CPT

## 2017-12-16 PROCEDURE — 97530 THERAPEUTIC ACTIVITIES: CPT

## 2017-12-16 PROCEDURE — 77030034850

## 2017-12-16 PROCEDURE — 74011250636 HC RX REV CODE- 250/636: Performed by: INTERNAL MEDICINE

## 2017-12-16 PROCEDURE — 74011000250 HC RX REV CODE- 250: Performed by: INTERNAL MEDICINE

## 2017-12-16 PROCEDURE — 74011250636 HC RX REV CODE- 250/636: Performed by: HOSPITALIST

## 2017-12-16 PROCEDURE — 84443 ASSAY THYROID STIM HORMONE: CPT | Performed by: HOSPITALIST

## 2017-12-16 PROCEDURE — 74011250637 HC RX REV CODE- 250/637: Performed by: HOSPITALIST

## 2017-12-16 PROCEDURE — 80048 BASIC METABOLIC PNL TOTAL CA: CPT | Performed by: HOSPITALIST

## 2017-12-16 PROCEDURE — 74011250637 HC RX REV CODE- 250/637: Performed by: FAMILY MEDICINE

## 2017-12-16 PROCEDURE — 82140 ASSAY OF AMMONIA: CPT | Performed by: FAMILY MEDICINE

## 2017-12-16 PROCEDURE — 70551 MRI BRAIN STEM W/O DYE: CPT

## 2017-12-16 PROCEDURE — 83036 HEMOGLOBIN GLYCOSYLATED A1C: CPT | Performed by: HOSPITALIST

## 2017-12-16 PROCEDURE — 71250 CT THORAX DX C-: CPT

## 2017-12-16 PROCEDURE — 36415 COLL VENOUS BLD VENIPUNCTURE: CPT | Performed by: HOSPITALIST

## 2017-12-16 PROCEDURE — 84300 ASSAY OF URINE SODIUM: CPT | Performed by: HOSPITALIST

## 2017-12-16 PROCEDURE — 74176 CT ABD & PELVIS W/O CONTRAST: CPT

## 2017-12-16 PROCEDURE — 65660000000 HC RM CCU STEPDOWN

## 2017-12-16 PROCEDURE — 80061 LIPID PANEL: CPT | Performed by: HOSPITALIST

## 2017-12-16 PROCEDURE — 97161 PT EVAL LOW COMPLEX 20 MIN: CPT

## 2017-12-16 PROCEDURE — 93880 EXTRACRANIAL BILAT STUDY: CPT

## 2017-12-16 PROCEDURE — G8979 MOBILITY GOAL STATUS: HCPCS

## 2017-12-16 RX ORDER — FLUDROCORTISONE ACETATE 0.1 MG/1
0.2 TABLET ORAL DAILY
Status: DISCONTINUED | OUTPATIENT
Start: 2017-12-16 | End: 2017-12-16

## 2017-12-16 RX ORDER — PANTOPRAZOLE SODIUM 20 MG/1
20 TABLET, DELAYED RELEASE ORAL DAILY
Status: DISCONTINUED | OUTPATIENT
Start: 2017-12-16 | End: 2017-12-20 | Stop reason: CLARIF

## 2017-12-16 RX ORDER — CARBIDOPA AND LEVODOPA 25; 100 MG/1; MG/1
1 TABLET ORAL 3 TIMES DAILY
Status: DISCONTINUED | OUTPATIENT
Start: 2017-12-16 | End: 2017-12-16

## 2017-12-16 RX ADMIN — DEXTROSE MONOHYDRATE 75 ML/HR: 5 INJECTION, SOLUTION INTRAVENOUS at 15:56

## 2017-12-16 RX ADMIN — LACTULOSE 20 G: 20 SOLUTION ORAL at 01:32

## 2017-12-16 RX ADMIN — SODIUM CHLORIDE: 234 INJECTION INTRAMUSCULAR; INTRAVENOUS; SUBCUTANEOUS at 20:10

## 2017-12-16 RX ADMIN — Medication 10 ML: at 15:57

## 2017-12-16 RX ADMIN — LATANOPROST 1 DROP: 50 SOLUTION OPHTHALMIC at 21:11

## 2017-12-16 NOTE — PROGRESS NOTES
Hospitalist Progress Note  Blanquita Lewis MD  Answering service: [phone] OR [phone] from in house phone      Date of Service:  2017  NAME:  Jose Elias Saleh  :  1940  MRN:  [id number]      Admission Summary:   Patient admitted for weight loss and failure to thrive    Interval history / Subjective:     Patient seen ,he Is having a weak Voice, he is not in distress, He is npo. 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 Lung biopsy    Sacral ulcer - unstageable on admission  Wound care consult    Hypernatremia  Could be related to reset osmostat in mineralocorticoid use  Hold Florinef and check urine NA  Dextrose IVF    Code status: Full  DVT prophylaxis: SCD    Care Plan discussed with: Patient/Family  Disposition: TBD    - d/w patients wife and updated on plan, She is concerned about his Parkinson medication, I have called pharmacy and verified its not here. RN will call ED to see if the medication is there, she also reports that he is not on sinemet anymore as he had intolerance to it. Hospital Problems  Date Reviewed: 12/15/2017          Codes Class Noted POA    * (Principal)Weakness ICD-10-CM: R53.1  ICD-9-CM: 780.79  12/15/2017 Yes                Review of Systems:   Review of systems not obtained due to patient factors. Vital Signs:    Last 24hrs VS reviewed since prior progress note.  Most recent are:  Visit Vitals    /63 (BP 1 Location: Left arm, BP Patient Position: At rest)    Pulse (!) 55    Temp 97.9 °F (36.6 °C)    Resp 15    Ht 6' 1\" (1.854 m)    Wt 53.5 kg (117 lb 14.4 oz)    SpO2 95%    BMI 15.56 kg/m2         Intake/Output Summary (Last 24 hours) at 17 1200  Last data filed at 12/16/17 0400   Gross per 24 hour   Intake              505 ml   Output              700 ml   Net             -195 ml        Physical Examination:             Constitutional:  No acute distress, cooperative, pleasant   ENT:  Oral mucous Dry, oropharynx benign. Neck supple,    Resp:  CTA bilaterally. No wheezing/rhonchi/rales. No accessory muscle use   CV:  Regular rhythm, normal rate, no murmurs, gallops, rubs    GI:  Soft, non distended, non tender. normoactive bowel sounds, no hepatosplenomegaly     Musculoskeletal:  No edema, warm, 2+ pulses throughout    Neurologic:  Moves all extremities. AAOx3, CN II-XII reviewed     Psych:  Pleasant       Data Review:    Review and/or order of clinical lab test  Review and/or order of tests in the radiology section of CPT  Decision to obtain old records and/or obtain history from someone other than the patient      Labs:     Recent Labs      12/15/17   1517   WBC  7.9   HGB  12.9   HCT  40.0   PLT  206     Recent Labs      12/16/17   0515  12/15/17   2334  12/15/17   1517   NA  158*  158*  157*   K  3.4*  3.5  3.7   CL  119*  121*  119*   CO2  32  32  30   BUN  25*  27*  31*   CREA  0.80  0.77  0.89   GLU  123*  132*  133*   CA  8.9  8.8  8.9     Recent Labs      12/15/17   1517   SGOT  87*   ALT  152*   AP  83   TBILI  0.3   TP  6.9   ALB  2.0*   GLOB  4.9*     No results for input(s): INR, PTP, APTT in the last 72 hours. No lab exists for component: INREXT   No results for input(s): FE, TIBC, PSAT, FERR in the last 72 hours. No results found for: FOL, RBCF   No results for input(s): PH, PCO2, PO2 in the last 72 hours.   Recent Labs      12/15/17   1517   TROIQ  <0.04     Lab Results   Component Value Date/Time    Cholesterol, total 106 12/16/2017 05:15 AM    HDL Cholesterol 26 12/16/2017 05:15 AM    LDL, calculated 58.4 12/16/2017 05:15 AM    Triglyceride 108 12/16/2017 05:15 AM    CHOL/HDL Ratio 4.1 12/16/2017 05:15 AM     Lab Results   Component Value Date/Time    Glucose (POC) 91 11/27/2009 12:20 PM     Lab Results   Component Value Date/Time    Color YELLOW/STRAW 06/28/2016 06:27 AM    Appearance CLEAR 06/28/2016 06:27 AM    Specific gravity 1.018 06/28/2016 06:27 AM    pH (UA) 7.0 06/28/2016 06:27 AM    Protein NEGATIVE  06/28/2016 06:27 AM    Glucose NEGATIVE  06/28/2016 06:27 AM    Ketone NEGATIVE  06/28/2016 06:27 AM    Bilirubin NEGATIVE  06/28/2016 06:27 AM    Urobilinogen 0.2 06/28/2016 06:27 AM    Nitrites NEGATIVE  06/28/2016 06:27 AM    Leukocyte Esterase NEGATIVE  06/28/2016 06:27 AM    Epithelial cells FEW 06/28/2016 06:27 AM    Bacteria NEGATIVE  06/28/2016 06:27 AM    WBC 0-4 06/28/2016 06:27 AM    RBC 0-5 06/28/2016 06:27 AM         Medications Reviewed:     Current Facility-Administered Medications   Medication Dose Route Frequency    lactulose (CHRONULAC) solution 20 g  20 g Rectal BID    fludrocortisone (FLORINEF) tablet 200 mcg  0.2 mg Oral DAILY    pantoprazole (PROTONIX) tablet 20 mg  20 mg Oral DAILY    aspirin chewable tablet 81 mg  81 mg Oral QHS    . PHARMACY TO SUBSTITUTE PER PROTOCOL    Per Protocol    polyethylene glycol (MIRALAX) packet 17 g  17 g Oral QHS    latanoprost (XALATAN) 0.005 % ophthalmic solution 1 Drop  1 Drop Both Eyes QHS    sodium chloride (NS) flush 5-10 mL  5-10 mL IntraVENous Q8H    sodium chloride (NS) flush 5-10 mL  5-10 mL IntraVENous PRN    acetaminophen (TYLENOL) tablet 650 mg  650 mg Oral Q4H PRN    Or    acetaminophen (TYLENOL) solution 650 mg  650 mg Per NG tube Q4H PRN    Or    acetaminophen (TYLENOL) suppository 650 mg  650 mg Rectal Q4H PRN    dextrose 5% infusion  75 mL/hr IntraVENous CONTINUOUS    rivastigmine (EXELON) 9.5 mg/24 hr patch 1 Patch  1 Patch TransDERmal DAILY     ______________________________________________________________________  EXPECTED LENGTH OF STAY: - - -  ACTUAL LENGTH OF STAY:          1                 Chika Dawson MD

## 2017-12-16 NOTE — PROGRESS NOTES
Admission Medication Reconciliation:    Information obtained from:  Patients companions and med bottles    Comments/Recommendations: Updated PTA meds/reviewed patient's allergies. 1) Meds removed:  Sinemet CR    2)  Meds added:  Exelon patch  Protonix  Tylenol (Liquid)  Fludrocortisone          Significant PMH/Disease States:   Past Medical History:   Diagnosis Date    CAD (coronary artery disease)     Hypercholesteremia     Osteoporosis     Parkinson disease ([facility] [address]        Chief Complaint for this Admission:    Chief Complaint   Patient presents with    Fatigue    Abnormal Lab Results       Allergies:  Review of patient's allergies indicates no known allergies. Prior to Admission Medications:   Prior to Admission Medications   Prescriptions Last Dose Informant Patient Reported? Taking? Calcium-Cholecalciferol, D3, 600 mg(1,500mg) -400 unit cap 12/14/2017 at Unknown time  Yes Yes   Sig: Take 1 Cap by mouth two (2) times a day. MULTIVIT-MIN/FA/LYCOPEN/LUTEIN (CENTRUM SILVER ULTRA MEN'S PO) 12/14/2017 at Unknown time  Yes Yes   Sig: Take 1 Tab by mouth daily. acetaminophen (TYLENOL) 160 mg/5 mL liquid   Yes Yes   Sig: Take  by mouth every six (6) hours as needed for Pain. alendronate (FOSAMAX) 70 mg tablet 12/10/2017  Yes Yes   Sig: Take 70 mg by mouth Every Saturday. aspirin 81 mg tablet   Yes Yes   Sig: Take 81 mg by mouth nightly. fludrocortisone (FLORINEF) 0.1 mg tablet 12/15/2017 at am  Yes Yes   Sig: Take 0.2 mg by mouth daily. latanoprost (XALATAN) 0.005 % ophthalmic solution 12/14/2017 at Unknown time  Yes Yes   Sig: Administer 1 Drop to both eyes nightly. pantoprazole (PROTONIX) 20 mg tablet 12/15/2017 at Unknown time  Yes Yes   Sig: Take 20 mg by mouth daily. pimavanserin (NUPLAZID) 17 mg tab 12/14/2017 at Unknown time  Yes Yes   Sig: Take 2 Tabs by mouth daily. polyethylene glycol (MIRALAX) 17 gram/dose powder   Yes Yes   Sig: Take 17 g by mouth nightly.    rivastigmine (EXELON) 9.5 mg/24 hr patch 2017 at Unknown time  Yes Yes   Si Patch by TransDERmal route daily.  Applies at 1700      Facility-Administered Medications: None

## 2017-12-16 NOTE — ACP (ADVANCE CARE PLANNING)
Advance Care Planning Note    Name: Zac Orosco  YOB: 1940  MRN: [id number]  Admission Date: 12/15/2017  2:36 PM    Date of discussion: 12/16/2017    Active Diagnoses:    Hospital Problems  Date Reviewed: 12/15/2017          Codes Class Noted POA    * (Principal)Weakness ICD-10-CM: R53.1  ICD-9-CM: 780.79  12/15/2017 Yes               These active diagnoses are of sufficient risk that focused discussion on advance care planning is indicated in order to allow the patient to thoughtfully consider personal goals of care, and if situations arise that prevent the ability to personally give input, to ensure appropriate representation of their personal desires for different levels and aggressiveness of care. Discussion:     Persons present and participating in discussion: Zac KwesiLizbeth MD, Wife    Discussion:   CPR, Cardioversion,Pacing,Pressors, Intubation,Bipap/CPAP,Antibiotics,Renal failure needing Dialysis. CODE STATUS: Full    Time Spent: 12    Total time spent face-to-face in education and discussion: 12  minutes.      Lizbeth Gonzalez MD  12/16/2017  6:37 PM

## 2017-12-16 NOTE — INTERDISCIPLINARY ROUNDS
IDR/SLIDR Summary          Patient: Goldie Ham MRN: [id number]    Age: 68 y.o. YOB: 1940 Room/Bed: [facility]   Admit Diagnosis: Weakness  Weakness  Principal Diagnosis: Weakness   Goals: decrease sodium levels, PT/OT, d/c planning  Readmission: NO  Quality Measure: Not applicable  VTE Prophylaxis: Mechanical  Influenza Vaccine screening completed? YES  Pneumococcal Vaccine screening completed? NO  Mobility needs: No   Nutrition plan:No  Consults:P.T, O.T., Speech and Case Management    Financial concerns:No  Escalated to CM? YES  RRAT Score: 10   Interventions:H2H  Testing due for pt today?  YES  LOS: 1 days Expected length of stay 2 days  Discharge plan: home w/ home health   PCP: Sindhu Gandhi MD  Transportation needs: No    Days before discharge:two or more days before discharge   Discharge disposition: Home    Signed:     Saintclair Climes Crumb  12/16/2017  12:45 AM

## 2017-12-16 NOTE — PROCEDURES
[address]  *** FINAL REPORT ***    Name: Brandon Salvador  MRN: [id number]    Inpatient  : 11 Dec 1940  HIS Order #: [id number]  [id number] [facility] Visit #: [id number]  Date: 16 Dec 2017    TYPE OF TEST: Cerebrovascular Duplex    REASON FOR TEST  Cerebrovascular accident    Right Carotid:-             Proximal               Mid                 Distal  cm/s  Systolic  Diastolic  Systolic  Diastolic  Systolic  Diastolic  CCA:     77.5       9.0                            79.0      10.0  Bulb:  ECA:    124.0  ICA:    100.0      14.0                           118.0      18.0  ICA/CCA:  1.3       1.4    ICA Stenosis:    Right Vertebral:-  Finding: Antegrade  Sys:       77.0  Natalie:    Right Subclavian:    Left Carotid:-            Proximal                Mid                 Distal  cm/s  Systolic  Diastolic  Systolic  Diastolic  Systolic  Diastolic  CCA:     83.6      12.0                            89.0      12.0  Bulb:  ECA:    138.0  ICA:     98.0      18.0                           118.0      22.0  ICA/CCA:  1.1       1.5    ICA Stenosis: Unknown    Left Vertebral:-  Finding: Antegrade  Sys:       78.0  Natalie:    Left Subclavian:    INTERPRETATION/FINDINGS  PROCEDURE:  Color duplex ultrasound imaging of extracranial  cerebrovascular arteries. FINDINGS:       Right:  Internal carotid velocity is within normal limits. There   is narrowing of the internal carotid flow channel on color Doppler  imaging and mixed density plaque on B-mode imaging, consistent with  less than 50 percent stenosis. The common and external carotid  arteries are patent and without evidence of hemodynamically  significant stenosis. Moderate wall thickening observed in common and   internal carotids with mild external carotid thickening seen. Left:   Internal carotid velocity is within normal limits.   There   is narrowing of the internal carotid flow channel on color Doppler  imaging and calcific plaque on B-mode imaging, consistent with less  than 50 percent stenosis. The common and external carotid arteries  are patent and without evidence of hemodynamically significant  stenosis. Moderate wall thickening observed in common and internal  carotids with mild external carotid thickening seen. IMPRESSION:  Consistent with less than 50% stenosis of the right  internal carotid and less than 50% stenosis of the left internal  carotid. Vertebrals are patent with antegrade flow. ADDITIONAL COMMENTS    I have personally reviewed the data relevant to the interpretation of  this  study.     TECHNOLOGIST: Cathi Coughlin RVT  Signed: 12/16/2017 09:42 AM    PHYSICIAN: Kvng Galicia MD  Signed: 12/18/2017 06:30 AM

## 2017-12-16 NOTE — CONSULTS
Consultation Note    NAME: Camilla Denis   :  1940   MRN:  [id number]     Date/Time:  2017 5:56 PM    I have been asked to see this patient by Dr. Za Rudolph  for advice/opinion re: hypernatremia. Assessment :    Plan:  Hypernatremia  Mild hyponatremia  Generalized weakness  Weight loss Sodium is 158; no obvious offending medications; CT concerning for lung neoplasm; I doubt a DI; I think he has a water deficit (at least 3-4 liters)    Will check serum osm, urine osm, place a polanco at least for tonight to accurately monitor output and will increase amount of water given (his glucose is a bit high so will keep D5 at 100 and add 1/4 NS at 100)       Subjective:   CHIEF COMPLAINT:  hypernatremia    HISTORY OF PRESENT ILLNESS:     Sagar Portillo is a 68 y.o.   male who has a history of parkinson's and CAD. Mr. Patricia Penaloza is not able to give history. He has been bed bound for the past few weeks. His wife feeds his each meal and gives him his drink. He is unable to get his own fluids to drink. He is incontinent of urine into a depends at home. Past Medical History:   Diagnosis Date    CAD (coronary artery disease)     Hypercholesteremia     Osteoporosis     Parkinson disease ([address]       Past Surgical History:   Procedure Laterality Date    CARDIAC SURG PROCEDURE UNLIST      bipass x 2    HX PROSTATECTOMY       Social History   Substance Use Topics    Smoking status: Former Smoker    Smokeless tobacco: Not on file    Alcohol use No      History reviewed. No pertinent family history. No Known Allergies   Prior to Admission medications    Medication Sig Start Date End Date Taking? Authorizing Provider   rivastigmine (EXELON) 9.5 mg/24 hr patch 1 Patch by TransDERmal route daily. Applies at 1700   Yes Historical Provider   pantoprazole (PROTONIX) 20 mg tablet Take 20 mg by mouth daily.    Yes Historical Provider   acetaminophen (TYLENOL) 160 mg/5 mL liquid Take  by mouth every six (6) hours as needed for Pain. Yes Historical Provider   fludrocortisone (FLORINEF) 0.1 mg tablet Take 0.2 mg by mouth daily. Yes Historical Provider   MULTIVIT-MIN/FA/LYCOPEN/LUTEIN (CENTRUM SILVER ULTRA MEN'S PO) Take 1 Tab by mouth daily. Yes Historical Provider   pimavanserin (NUPLAZID) 17 mg tab Take 2 Tabs by mouth daily. Yes Historical Provider   Calcium-Cholecalciferol, D3, 600 mg(1,500mg) -400 unit cap Take 1 Cap by mouth two (2) times a day. Yes Historical Provider   polyethylene glycol (MIRALAX) 17 gram/dose powder Take 17 g by mouth nightly. Yes Historical Provider   latanoprost (XALATAN) 0.005 % ophthalmic solution Administer 1 Drop to both eyes nightly. Yes Historical Provider   alendronate (FOSAMAX) 70 mg tablet Take 70 mg by mouth Every Saturday. Yes Fortino Mcgarry MD   aspirin 81 mg tablet Take 81 mg by mouth nightly.    Yes Fortino Mcgarry MD     REVIEW OF SYSTEMS:     [x]  Unable to obtain reliable ROS due to  [x] mental status  [] sedated   [] intubated   [] Total of 12 systems reviewed as follows:  Constitutional: negative fever, negative chills, negative weight loss  Eyes:   negative visual changes  ENT:   negative sore throat, tongue or lip swelling  Respiratory:  negative cough, negative dyspnea  Cards:  negative for chest pain, palpitations, lower extremity edema  GI:   negative for nausea, vomiting, diarrhea, and abdominal pain  :  negative for frequency, dysuria  Integument:  negative for rash and pruritus  Heme:  negative for easy bruising and gum/nose bleeding  Musculoskel: negative for myalgias,  back pain and muscle weakness  Neuro:  negative for headaches, dizziness, vertigo  Psych:  negative for feelings of anxiety, depression   Travel?: none    Objective:   VITALS:    Visit Vitals    BP 98/41 (BP 1 Location: Right arm, BP Patient Position: At rest)    Pulse 67    Temp 97.9 °F (36.6 °C)    Resp 15    Ht 6' 1\" (1.854 m)    Wt 53.5 kg (117 lb 14.4 oz)    SpO2 98%    BMI 15.56 kg/m2 PHYSICAL EXAM:  Gen:  []  WD []  WN  [] cachectic [x]  thin []  obese []  disheveled             [x]  ill apearing  []   Critical  [x]   Chronic    []  No acute distress    HEENT:   [x] NC/AT/PERRLA/EOMI    [] pink conjunctivae      [] pale conjunctivae                  PERRL  [] yes  [] no      [] moist mucosa    [] dry mucosa    hearing intact to voice [] yes  [x] No                 NECK:   supple [x] yes  [] no        masses [] yes  [] No               thyroid  []  non tender  []  tender    RESP:   [x] CTA bilaterally/no wheezing/rhonchi/rales/crackles    [] rhonchi bilaterally - no dullness  [] wheezing   [] rhonchi   [] crackles     use of accessory muscles [] yes [x] no    CARD:   [x]  regular rate and rhythm/No murmurs/rubs/gallops    murmur  [] yes ()  [] no      Rubs  [] yes  [] no       Gallops [] yes  [] no    Rate []  regular  []  irregular        carotid bruits  [] Right  []  Left                 LE edema [] yes  [x] no           JVP  []  yes   [x]  no    ABD:    [x] soft/non distended/non tender/+bowel sounds/no HSM    []  Rigid    tenderness [] yes [] no   Liver enlargement  []  yes []  no                Spleen enlargement  []  yes []  no     distended []  yes [] no     bowel sound  [] hypoactive   [] hyperactive    LYMPH:    Neck []  yes [x]  no       Axillae []  yes []  no    SKIN:   Rashes []  yes   [x]  no    Ulcers []  yes   []  no               [] tight to palpitation    skin turgor []  good  [x] poor  [] decreased               Cyanosis/clubbing []  yes []  no    NEUR:   [] cranial nerves II-XII grossly intact       [] Cranial nerves deficit                 []  facial droop    []  slurred speech   [] aphasic     [] Strength normal     []  weakness  []  LUE  []   RUE/ []  LLE  []   RLE    follows commands  []  yes []  no           PSYCH:   insight [] poor [] good   Alert and Oriented to  [] person  [] place  []  time                    [] depressed [] anxious [] agitated  [x] lethargic [] stuporous  [] sedated     LAB DATA REVIEWED:    Recent Labs      12/15/17   1517   WBC  7.9   HGB  12.9   HCT  40.0   PLT  206     Recent Labs      12/16/17   0515  12/15/17   2334  12/15/17   1517   NA  158*  158*  157*   K  3.4*  3.5  3.7   CL  119*  121*  119*   CO2  32  32  30   BUN  25*  27*  31*   CREA  0.80  0.77  0.89   GLU  123*  132*  133*   CA  8.9  8.8  8.9     Recent Labs      12/15/17   1517   SGOT  87*   ALT  152*   AP  83   TBILI  0.3   ALB  2.0*   GLOB  4.9*     No results for input(s): INR, PTP, APTT in the last 72 hours. No lab exists for component: INREXT   No results for input(s): FE, TIBC, PSAT, FERR in the last 72 hours. No results for input(s): PH, PCO2, PO2 in the last 72 hours. No results for input(s): CPK, CKMB in the last 72 hours. No lab exists for component: TROPONINI  Lab Results   Component Value Date/Time    Glucose (POC) 91 11/27/2009 12:20 PM       Procedures: see electronic medical records for all procedures/Xrays and details which were not copied into this note but were reviewed prior to creation of Plan.    ________________________________________________________________________       ___________________________________________________  Consulting Physician:  Alberto Prader, MD

## 2017-12-16 NOTE — PROGRESS NOTES
Verbal report per telephone received from ER RN and will pass on report to night shift RN Vijaya BARNEY

## 2017-12-16 NOTE — PROGRESS NOTES
Problem: Mobility Impaired (Adult and Pediatric)  Goal: *Acute Goals and Plan of Care (Insert Text)  Physical Therapy Goals  Initiated 12/16/2017  1. Patient will move from supine to sit and sit to supine , scoot up and down and roll side to side in bed with moderate assistance  within 7 day(s). 2.  Patient will transfer from bed to chair and chair to bed with maximal assistance using the least restrictive device within 7 day(s). 3. Patient will demonstrate good static sitting balance in 7 days. 4.  Patient will participate in active ROM exercises for all joints within 7 days. physical Therapy EVALUATION  Patient: Audelia Chandler (69 y.o. male)  Date: 12/16/2017  Primary Diagnosis: Weakness  Weakness        Precautions: falls, sacral wound       ASSESSMENT :  Based on the objective data described below, the patient presents with generalized weakness, rigidity in all joints, sacral ulcer and redness over malleoli, decreased mobility. Pt has been having home PT and being cared by his wife. He has had a recent decline over last several weeks, as he was walking 3 weeks ago. Pt currently max to total A of 2 for all bed mobility (able to assist in rolling). He has no sitting balance at this time. Noted to have soiled himself so placed back in supine and cleaned, brief placed on and noted sacral dressing needs changing, nurse made aware. Placed brief on and returned to left semi sidelying with pillows between legs. Spoke with nsg about additional wounds over malleoli. Pt will likely need a hospital bed, which his home PT was getting ready to order. Spoke with wife about having her call and speak with CM on Monday so this can be coordinated. He should definitely have a specialty care mattress and something to assist wife in his care such as side rails, etc.  Would consider palliative consult if family is amenable.   Will follow pt 2x/week on a temporary basis to see if there is improvement in his mobility/strength. Patient will benefit from skilled intervention to address the above impairments. Patients rehabilitation potential is considered to be Guarded  Factors which may influence rehabilitation potential include:   []         None noted  []         Mental ability/status  []         Medical condition  []         Home/family situation and support systems  []         Safety awareness  []         Pain tolerance/management  []         Other:      PLAN :  Recommendations and Planned Interventions:  []           Bed Mobility Training             []    Neuromuscular Re-Education  []           Transfer Training                   []    Orthotic/Prosthetic Training  []           Gait Training                         []    Modalities  []           Therapeutic Exercises           []    Edema Management/Control  []           Therapeutic Activities            []    Patient and Family Training/Education  []           Other (comment):    Frequency/Duration: Patient will be followed by physical therapy  2 times a week to address goals. Discharge Recommendations: Home Health  Further Equipment Recommendations for Discharge: hospital bed, BSC     SUBJECTIVE:   Patient stated Ok.     OBJECTIVE DATA SUMMARY:   HISTORY:    Past Medical History:   Diagnosis Date    CAD (coronary artery disease)     Hypercholesteremia     Osteoporosis     Parkinson disease ([address]      Past Surgical History:   Procedure Laterality Date    CARDIAC SURG PROCEDURE UNLIST      bipass x 2    HX PROSTATECTOMY       Prior Level of Function/Home Situation: was walking until 3 weeks ago  Personal factors and/or comorbidities impacting plan of care:     Home Situation  Home Environment: Private residence  # Steps to Enter: 4  One/Two Story Residence: Two story  Living Alone: No  Support Systems: Child(bear), Family member(s), Friends \ neighbors, Home care staff, Spouse/Significant Other/Partner  Patient Expects to be Discharged to[employer] residence  Current DME Used/Available at Home: Hetal Quinones, rollator, Wheelchair    EXAMINATION/PRESENTATION/DECISION MAKING:   Critical Behavior:  Neurologic State: Alert, Confused, Eyes open to voice  Orientation Level: Unable to verbalize  Cognition: Unable to assess (comment), No command following (non verbal)     Hearing: Auditory  Auditory Impairment: None  Skin: see note but sacral wound, redness around malleoli and bony prominences (knees etc)  Edema: none noted,  Range Of Motion:  AROM: Grossly decreased, non-functional      PROM: Grossly decreased, non-functional           Strength:    Strength: Grossly decreased, non-functional         Tone & Sensation:   Tone: Abnormal (rigidity noted all joints all extremities)          Coordination:  Coordination: Grossly decreased, non-functional  Vision:      Functional Mobility:  Bed Mobility:  Rolling: Maximum assistance;Assist x2  Supine to Sit: Total assistance;Assist x2  Sit to Supine: Assist x2;Total assistance  Scooting: Assist x2;Total assistance  Transfers:  Sit to Stand:  (unable)            Balance:   Sitting: Impaired  Sitting - Static: None  Sitting - Dynamic: Not tested   Stairs:     Functional Measure:  Timed up and go:    Timed Get Up And Go Test:  (unable to complete, pt unable to stand)     Timed Up and Go and G-code impairment scale:  Percentage of Impairment CH    0%   CI    1-19% CJ    20-39% CK    40-59% CL    60-79% CM    80-99% CN     100%   Timed   Score 0-56 10 11-12 13-14 15-16 17-18 19 20       < than 10 seconds=Normal  Greater then 13.5 seconds (in elderly)=Increased fall risk   [place], Jerrell Ramirez. Predicting the probability for falls in community dwelling older adults using the Timed Up and Go Test. Phys Ther. 2000;80:896-903. G codes: In compliance with CMSs Claims Based Outcome Reporting, the following G-code set was chosen for this patient based on their primary functional limitation being treated:     The outcome measure chosen to determine the severity of the functional limitation was the TUG with a score of > 20 seconds which was correlated with the impairment scale. ? Mobility - Walking and Moving Around:     - CURRENT STATUS: CN - 100% impaired, limited or restricted    - GOAL STATUS: CM - 80%-99% impaired, limited or restricted    - D/C STATUS:  ---------------To be determined---------------      Physical Therapy Evaluation Charge Determination   History Examination Presentation Decision-Making   MEDIUM  Complexity : 1-2 comorbidities / personal factors will impact the outcome/ POC  LOW Complexity : 1-2 Standardized tests and measures addressing body structure, function, activity limitation and / or participation in recreation  MEDIUM Complexity : Evolving with changing characteristics  LOW Complexity : FOTO score of       Based on the above components, the patient evaluation is determined to be of the following complexity level: LOW     Pain:  Pain Scale 1: Adult Nonverbal Pain Scale  Pain Intensity 1: 0              Activity Tolerance:   poor  Please refer to the flowsheet for vital signs taken during this treatment. After treatment:   []         Patient left in no apparent distress sitting up in chair  [x]         Patient left in no apparent distress in bed  [x]         Call bell left within reach  [x]         Nursing notified  [x]         Caregiver present  []         Bed alarm activated    COMMUNICATION/EDUCATION:   The patients plan of care was discussed with: Registered Nurse. [x]         Fall prevention education was provided and the patient/caregiver indicated understanding. [x]         Patient/family have participated as able in goal setting and plan of care. [x]         Patient/family agree to work toward stated goals and plan of care. []         Patient understands intent and goals of therapy, but is neutral about his/her participation.   []         Patient is unable to participate in goal setting and plan of care.     Thank you for this referral.  Milka Johnson, PT   Time Calculation: 30 mins

## 2017-12-16 NOTE — ROUTINE PROCESS
Primary Nurse Alexi José and BALJIT Valladares performed a dual skin assessment on this patient Impairment noted- see wound doc flow sheet  Jose score is 13

## 2017-12-16 NOTE — ROUTINE PROCESS
Bedside and Verbal shift change report given to Tonie Rubio (oncoming nurse) by Lauren Forman (offgoing nurse). Report included the following information SBAR, Kardex, Intake/Output, MAR, Recent Results, Med Rec Status and Cardiac Rhythm NSR.

## 2017-12-16 NOTE — CONSULTS
Consult dictated. Admitted with hypernatremia. Has LLL mass. Hx of parkinsons-resume sinemet. 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 Cont ASA.   Valerie Soares MD

## 2017-12-16 NOTE — PROGRESS NOTES
Problem: Falls - Risk of  Goal: *Absence of Falls  Document Ora Fall Risk and appropriate interventions in the flowsheet.    Outcome: Progressing Towards Goal  Fall Risk Interventions:  Mobility Interventions: Communicate number of staff needed for ambulation/transfer, PT Consult for mobility concerns    Mentation Interventions: Adequate sleep, hydration, pain control, Bed/chair exit alarm, Door open when patient unattended, More frequent rounding, Reorient patient, Toileting rounds    Medication Interventions: Bed/chair exit alarm, Evaluate medications/consider consulting pharmacy    Elimination Interventions: Bed/chair exit alarm, Call light in reach, Toileting schedule/hourly rounds

## 2017-12-16 NOTE — H&P
[facility]  ACUTE CARE HISTORY AND PHYSICAL    Ronald Teja.  MR#: [id number]  : 1940  ACCOUNT #: [de-identified]   DATE OF SERVICE: 12/15/2017    DATE OF ADMIT:  12/15/2017 at 5:00 p.m. PRIMARY CARE PHYSICIAN:  Dr. Alireza Ocampo. PRIMARY NEUROLOGIST:  Dr. Charmaine Gaona at [facility]:  Dr. Alec Matute. CHIEF COMPLAINT:  \"Was sent by the home health nurse because of abnormal labs. \"    HISTORY OF PRESENT ILLNESS:  This is a 51-year-old -American male with a past medical history of hypercholesterolemia, coronary artery disease status post CABG, Parkinson disease, prostate cancer status post prostatectomy, who comes over here after being sent by the home health nurse. About 4 days ago, the patient was seen by a new home health facility and they recommended getting labs on the patient \"for baseline. \" Today the home health nurse came by and told the patient's family member that his protein level was low and his sodium level was elevated and that is why he needs to come to the ER. On further asking, the patient's wife claims that since last 3 weeks, the patient has been having increasing generalized weakness as well as weight loss and decreased activity. She notes the patient is not having any chest pain, shortness of breath, cough, fever, nausea, vomiting, headache, or dizziness. No changes in bowel movements. She claims that the patient has been eating as usual.  He might not be drinking water as much as he should. REVIEW OF SYSTEMS:  Pertinent positives as above. Rest of review of systems were reviewed, but were negative. PAST MEDICAL HISTORY:  1. Coronary artery disease. 2.  Hypercholesterolemia. 3.  Osteoporosis. 4.  Parkinson disease. PAST SURGICAL HISTORY:    1.  CABG. 2.  Prostatectomy. SOCIAL HISTORY:  Nonsmoker, nonalcoholic, nondrug abuser. ALLERGIES:  No known drug allergies.     HOME MEDICATIONS:  The patient is on following medications:  Fosamax 70 mg p.o. every Saturday, aspirin 81 mg p.o. at bedtime, Sinemet-CR 25/50 half tablet 3 times daily, multivitamin 1 p.o. daily, Nuplazid 17 mg tablet 2 tablets daily, and MiraLax 17 grams p.o. at bedtime. PHYSICAL EXAMINATION:    VITAL SIGNS:  At the time the patient was seen, the patient's vitals were as follows:  Blood pressure 125/68, pulse 70, afebrile, respiratory rate 18, saturating 93% on room air. GENERAL:  Not in acute distress, comfortable, lying in bed, drowsy, but arousable on verbal stimuli. HEENT:  Normocephalic, atraumatic. Eyes:  PERRLA, EOMI. Ears:  Bilateral hearing normal.  Throat:  , no bleeding. Mouth:  Dry mucous membranes. Decent oral hygiene. NECK:  Supple. No JVD. No thyromegaly. RESPIRATORY:  Clear to auscultation bilaterally. No adventitious breath sounds. CARDIOVASCULAR:  S1, S2 normal.  No murmurs, rubs or gallops. GASTROINTESTINAL:  Bowel sounds present, soft, nontender, nondistended. NEUROLOGIC:  I was not able to do a good neurological/psych examination, but the patient does not seem to have any focal deficit. LABORATORIES AND RADIOLOGICAL RESULTS:  WBC 7.9, hemoglobin 12.9, hematocrit 40 and a platelet count of 095. Sodium 157, potassium 3.7, chloride 119, bicarbonate 30, BUN 31, creatinine 0.89. Lactic acid 1.7. ALT of 152, AST 87. Troponin x1 is negative. ProBNP 1,343. X-ray of the chest did not show any acute process. CT scan of the head showed no acute intracranial hemorrhage, apparent area of low attenuation in the left aspect of the sinuses may represent an age indeterminate infarct. EKG has not been done. ASSESSMENT AND PLAN:  This is a 66-year-old -American male with a past medical history of coronary artery disease, hypercholesterolemia, Parkinson disease, history of prostate cancer status post prostatectomy, comes over here after being sent by the home health nurse because of abnormal labs  (hypernatremia).   In addition, the wife also mentions about increasing weakness, weight loss, and decreased activity. 1.  Hypernatremia. I will admit the patient, put the patient on IV fluid and would slowly try to decrease the patient's sodium level, that is why we would do frequent BMP. 2.  Increasing weakness. With a CT scan suggestive of age indeterminate stroke, I would get an MRI of the brain and would consult the neurology regarding the same. I would also get bilateral carotids as well as echo. I will put the patient on aspirin. 3.  Unintentional weight loss. I would get a CT chest as well as abdomen for the patient and that needs to be followed by the attending tomorrow. 4.  Coronary artery disease, status post coronary artery bypass graft, stable. 5.  History of Parkinson disease. We will continue the patient's home medication. I spent about 50 minutes in taking care of the patient.       Troy Thompson MD       PG / DN  D: 12/15/2017 17:11     T: 12/15/2017 18:19  JOB #: [id number]

## 2017-12-17 LAB
ALBUMIN SERPL-MCNC: 1.8 G/DL (ref 3.5–5)
ALBUMIN/GLOB SERPL: 0.5 {RATIO} (ref 1.1–2.2)
ALP SERPL-CCNC: 76 U/L (ref 45–117)
ALT SERPL-CCNC: 109 U/L (ref 12–78)
ANION GAP SERPL CALC-SCNC: 5 MMOL/L (ref 5–15)
ANION GAP SERPL CALC-SCNC: 9 MMOL/L (ref 5–15)
AST SERPL-CCNC: 66 U/L (ref 15–37)
BASOPHILS # BLD: 0 K/UL (ref 0–0.1)
BASOPHILS NFR BLD: 0 % (ref 0–1)
BILIRUB SERPL-MCNC: 1.1 MG/DL (ref 0.2–1)
BUN SERPL-MCNC: 25 MG/DL (ref 6–20)
BUN SERPL-MCNC: 25 MG/DL (ref 6–20)
BUN/CREAT SERPL: 32 (ref 12–20)
BUN/CREAT SERPL: 33 (ref 12–20)
CALCIUM SERPL-MCNC: 7.9 MG/DL (ref 8.5–10.1)
CALCIUM SERPL-MCNC: 8.2 MG/DL (ref 8.5–10.1)
CHLORIDE SERPL-SCNC: 117 MMOL/L (ref 97–108)
CHLORIDE SERPL-SCNC: 118 MMOL/L (ref 97–108)
CO2 SERPL-SCNC: 28 MMOL/L (ref 21–32)
CO2 SERPL-SCNC: 31 MMOL/L (ref 21–32)
CREAT SERPL-MCNC: 0.75 MG/DL (ref 0.7–1.3)
CREAT SERPL-MCNC: 0.77 MG/DL (ref 0.7–1.3)
EOSINOPHIL # BLD: 0.2 K/UL (ref 0–0.4)
EOSINOPHIL NFR BLD: 3 % (ref 0–7)
ERYTHROCYTE [DISTWIDTH] IN BLOOD BY AUTOMATED COUNT: 14.6 % (ref 11.5–14.5)
GLOBULIN SER CALC-MCNC: 3.7 G/DL (ref 2–4)
GLUCOSE SERPL-MCNC: 85 MG/DL (ref 65–100)
GLUCOSE SERPL-MCNC: 87 MG/DL (ref 65–100)
HCT VFR BLD AUTO: 34.4 % (ref 36.6–50.3)
HGB BLD-MCNC: 11 G/DL (ref 12.1–17)
LYMPHOCYTES # BLD: 0.6 K/UL (ref 0.8–3.5)
LYMPHOCYTES NFR BLD: 9 % (ref 12–49)
MCH RBC QN AUTO: 30.6 PG (ref 26–34)
MCHC RBC AUTO-ENTMCNC: 32 G/DL (ref 30–36.5)
MCV RBC AUTO: 95.6 FL (ref 80–99)
MONOCYTES # BLD: 0.3 K/UL (ref 0–1)
MONOCYTES NFR BLD: 4 % (ref 5–13)
NEUTS SEG # BLD: 5.6 K/UL (ref 1.8–8)
NEUTS SEG NFR BLD: 84 % (ref 32–75)
OSMOLALITY SERPL: 328 MOSM/KG H2O
PLATELET # BLD AUTO: 192 K/UL (ref 150–400)
POTASSIUM SERPL-SCNC: 3.4 MMOL/L (ref 3.5–5.1)
POTASSIUM SERPL-SCNC: 3.6 MMOL/L (ref 3.5–5.1)
PROT SERPL-MCNC: 5.5 G/DL (ref 6.4–8.2)
RBC # BLD AUTO: 3.6 M/UL (ref 4.1–5.7)
SODIUM SERPL-SCNC: 152 MMOL/L (ref 136–145)
SODIUM SERPL-SCNC: 154 MMOL/L (ref 136–145)
SODIUM SERPL-SCNC: 154 MMOL/L (ref 136–145)
WBC # BLD AUTO: 6.6 K/UL (ref 4.1–11.1)

## 2017-12-17 PROCEDURE — 85025 COMPLETE CBC W/AUTO DIFF WBC: CPT | Performed by: HOSPITALIST

## 2017-12-17 PROCEDURE — 74011000250 HC RX REV CODE- 250: Performed by: INTERNAL MEDICINE

## 2017-12-17 PROCEDURE — 74011250636 HC RX REV CODE- 250/636: Performed by: INTERNAL MEDICINE

## 2017-12-17 PROCEDURE — 83930 ASSAY OF BLOOD OSMOLALITY: CPT | Performed by: HOSPITALIST

## 2017-12-17 PROCEDURE — 74011250637 HC RX REV CODE- 250/637: Performed by: HOSPITALIST

## 2017-12-17 PROCEDURE — 36415 COLL VENOUS BLD VENIPUNCTURE: CPT | Performed by: HOSPITALIST

## 2017-12-17 PROCEDURE — 80053 COMPREHEN METABOLIC PANEL: CPT | Performed by: HOSPITALIST

## 2017-12-17 PROCEDURE — 74011250636 HC RX REV CODE- 250/636: Performed by: HOSPITALIST

## 2017-12-17 PROCEDURE — 80048 BASIC METABOLIC PNL TOTAL CA: CPT | Performed by: HOSPITALIST

## 2017-12-17 PROCEDURE — 84295 ASSAY OF SERUM SODIUM: CPT | Performed by: INTERNAL MEDICINE

## 2017-12-17 PROCEDURE — 74011250637 HC RX REV CODE- 250/637: Performed by: FAMILY MEDICINE

## 2017-12-17 PROCEDURE — 65660000000 HC RM CCU STEPDOWN

## 2017-12-17 PROCEDURE — 74011000258 HC RX REV CODE- 258: Performed by: INTERNAL MEDICINE

## 2017-12-17 RX ORDER — MORPHINE SULFATE 2 MG/ML
2 INJECTION, SOLUTION INTRAMUSCULAR; INTRAVENOUS
Status: DISCONTINUED | OUTPATIENT
Start: 2017-12-17 | End: 2018-01-04 | Stop reason: HOSPADM

## 2017-12-17 RX ORDER — DEXTROSE MONOHYDRATE AND SODIUM CHLORIDE 5; .225 G/100ML; G/100ML
150 INJECTION, SOLUTION INTRAVENOUS CONTINUOUS
Status: DISCONTINUED | OUTPATIENT
Start: 2017-12-17 | End: 2017-12-18

## 2017-12-17 RX ADMIN — MORPHINE SULFATE 2 MG: 2 INJECTION, SOLUTION INTRAMUSCULAR; INTRAVENOUS at 22:38

## 2017-12-17 RX ADMIN — MORPHINE SULFATE 2 MG: 2 INJECTION, SOLUTION INTRAMUSCULAR; INTRAVENOUS at 15:39

## 2017-12-17 RX ADMIN — DEXTROSE MONOHYDRATE AND SODIUM CHLORIDE 150 ML/HR: 5; .225 INJECTION, SOLUTION INTRAVENOUS at 13:29

## 2017-12-17 RX ADMIN — SODIUM CHLORIDE: 234 INJECTION INTRAMUSCULAR; INTRAVENOUS; SUBCUTANEOUS at 05:16

## 2017-12-17 RX ADMIN — LATANOPROST 1 DROP: 50 SOLUTION OPHTHALMIC at 22:49

## 2017-12-17 RX ADMIN — Medication 10 ML: at 22:50

## 2017-12-17 RX ADMIN — DEXTROSE MONOHYDRATE AND SODIUM CHLORIDE 150 ML/HR: 5; .225 INJECTION, SOLUTION INTRAVENOUS at 20:51

## 2017-12-17 RX ADMIN — Medication 10 ML: at 13:31

## 2017-12-17 RX ADMIN — LACTULOSE 1000 ML: 10 SOLUTION ORAL at 18:00

## 2017-12-17 RX ADMIN — ACETAMINOPHEN 650 MG: 650 SUPPOSITORY RECTAL at 05:33

## 2017-12-17 NOTE — PROGRESS NOTES
Problem: Pressure Injury - Risk of  Goal: *Prevention of pressure ulcer  Outcome: Progressing Towards Goal   12/16/17 2000   Wound Prevention and Protection Methods   Orientation of Wound Prevention Posterior   Location of Wound Prevention Sacrum/Coccyx   Dressing Present  Changed   Dressing Status Intact  (new)   Read Only, Retired: Wound Treatment (non-mechanical)   Wound Offloading (Prevention Methods) Bed, pressure reduction mattress;Elevate heels;Pillows;Repositioning   Q2 hour turn/repositioning with pillows. Heels off loaded. Wound care consult ordered.

## 2017-12-17 NOTE — CONSULTS
[address] [place] [address]  MR#: [id number]  : 1940  ACCOUNT #: [de-identified]   DATE OF SERVICE: 2017    CONSULTATION    REQUESTING PHYSICIAN:  Dr. Bernarda Buerger.    REASON FOR EVALUATION:  Abnormal CT scan. HISTORY OF PRESENT ILLNESS:  The patient is a 72-year-old -American male with past medical history of hypercholesterolemia, coronary artery disease status post CABG, Parkinson's disease who was sent into the hospital by her home health nurse as his lab work revealed hyperneutropenia. Reportedly, the patient has been having increasing generalized weakness and unexplained weight loss and decreased activity over the past several weeks. He was admitted for further workup. His sodium was noted to be 158. His CT scan of the chest revealed a left lower lung lobe mass that was concerning for malignancy. CT scan of the head showed a pontine hypodensity suspicious for an age indeterminate infarct. MRI confirmed this to be an old infarct. The patient talks in a very low and slow voice and says that he had a stroke, but it was a long time ago. Denies any residual deficits. He does have baseline difficulty with ambulation, possibly related to Parkinson's disease. Denies any headache, new focal motor or sensory deficits, chest pain, shortness of breath, palpitations, nausea or vomiting. PAST MEDICAL HISTORY:  As mentioned above. SOCIAL HISTORY:  Nonsmoker, nonalcoholic. Lives at home. ALLERGIES:  None. HOME MEDICATIONS:  Fosamax, aspirin, Sinemet-CR 25/250 unknown frequency. Multivitamin. Nuplazid,  MiraLax. PHYSICAL EXAMINATION:  GENERAL:  The patient is lying in bed in no acute distress. He has a decreased expression on his face and talks in a very low pitched muffled voice. VITAL SIGNS:  Blood pressure 137/88, temperature 98.6, pulse is 72. HEENT:  His pupils are equal and reactive. Extraocular movements are full.   Face is symmetric. NECK:  Has increased muscle tone/rigidity in both upper extremities. NEUROLOGIC:  He is able to move all limbs, but moves his legs very little and very slow. Deep tendon reflexes 2 x 2. Toes are mute. Sensory examination, unreliable. Gait exam deferred. CARDIOVASCULAR:  Regular rate. CHEST:  Clear. ABDOMEN:  Soft. EXTREMITIES:  No edema. LABORATORY DATA:  CBC with WBC 7.9, hemoglobin 12.9, hematocrit 40.0, platelets 897. Chemistry:  Sodium 158, potassium 3.4, BUN 25, creatinine 0.80, triglycerides 108, LDL 58.4, hemoglobin A1c 5.6. Ammonia 38. 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 Carotid ultrasound did not show any significant stenosis. The vertebrals are patent with antegrade flow. Echocardiogram shows an ejection fraction of 60%, no wall motion abnormalities. Left atrial size was normal.    ASSESSMENT AND PLAN:  A 51-year-old male with a history of coronary artery disease and Parkinson's disease who is admitted with symptoms of lethargy, generalized weakness and weight loss. He was found to have severe hyponatremia, which is being treated. Further workup also reveals that he has a left lower lobe lung mass concerning for malignancy. His generalized weakness is likely due to metabolic/systemic reasons. He does have a history of Parkinson's and I would recommend resuming his home dose of Sinemet as he appears quite rigid. The pontine stroke seen on the imaging is old and would not change any management in this regard. Continue aspirin 81 mg daily and his LDL is at goal.  Continue management of underlying issues. Please feel free to contact me with any further questions. Thank you for this consultation.       MD ANA Griggs / ADELA  D: 12/16/2017 13:04     T: 12/16/2017 23:03  JOB #: [id number]

## 2017-12-17 NOTE — ROUTINE PROCESS
Bedside and Verbal shift change report given to Willie Kumari (oncoming nurse) by Francois Saldaña (offgoing nurse). Report included the following information SBAR, Kardex, Intake/Output, MAR, Recent Results, Med Rec Status and Cardiac Rhythm NSR.

## 2017-12-17 NOTE — PROGRESS NOTES
NAME: Jefry Dunn        :  1940        MRN:  [id number]        Assessment :    Plan:  --Hypernatremia  Mild hyponatremia  Generalized weakness  Weight loss  Dehyration Sodium better (158 to 154 to 152); Continue D5 - 0.2 % NS at 150; appropriate rate of sodium improvement     Continue correcting free water deficit           Subjective:     Chief Complaint:  More alert. Non-verbal. I spoke with his wife in the room. Review of Systems:    Symptom Y/N Comments  Symptom Y/N Comments   Fever/Chills    Chest Pain     Poor Appetite    Edema     Cough    Abdominal Pain     Sputum    Joint Pain     SOB/ZAMUDIO    Pruritis/Rash     Nausea/vomit    Tolerating PT/OT     Diarrhea    Tolerating Diet     Constipation    Other       Could not obtain due to: See above     Objective:     VITALS:   Last 24hrs VS reviewed since prior progress note. Most recent are:  Visit Vitals    /49 (BP 1 Location: Right arm, BP Patient Position: At rest)    Pulse 61    Temp 98.1 °F (36.7 °C)    Resp 16    Ht 6' 1\" (1.854 m)    Wt 55.9 kg (123 lb 4.8 oz)    SpO2 99%    BMI 16.27 kg/m2       Intake/Output Summary (Last 24 hours) at 17 0743  Last data filed at 17 0400   Gross per 24 hour   Intake          2000.83 ml   Output             1200 ml   Net           800.83 ml      Telemetry Reviewed:     PHYSICAL EXAM:  General: Thin, chronically ill appearing  Resp:  CTA Bilaterally. No Wheezing/Rhonchi/Rales. No access. muscle use  CV:  Regular  rhythm,  No murmur (), No Rubs, No Gallops.  No edema  GI:  Soft, Non distended, Non tender.  +Bowel sounds, no HSM      Lab Data Reviewed: (see below)    Medications Reviewed: (see below)    PMH/SH reviewed - no change compared to H&P  ________________________________________________________________________  Care Plan discussed with:  Patient     Family  y    RN     Care Manager Consultant:          Comments   >50% of visit spent in counseling and coordination of care       ________________________________________________________________________  Xander Muniz MD     Procedures: see electronic medical records for all procedures/Xrays and details which  were not copied into this note but were reviewed prior to creation of Plan. LABS:  Recent Labs      12/17/17   0246  12/15/17   1517   WBC  6.6  7.9   HGB  11.0*  12.9   HCT  34.4*  40.0   PLT  192  206     Recent Labs      12/17/17   0246  12/16/17   0515  12/15/17   2334   NA  154*  154*  158*  158*   K  3.6  3.4*  3.4*  3.5   CL  117*  118*  119*  121*   CO2  28  31  32  32   BUN  25*  25*  25*  27*   CREA  0.75  0.77  0.80  0.77   GLU  85  87  123*  132*   CA  7.9*  8.2*  8.9  8.8     Recent Labs      12/17/17   0246  12/15/17   1517   SGOT  66*  87*   AP  76  83   TP  5.5*  6.9   ALB  1.8*  2.0*   GLOB  3.7  4.9*     No results for input(s): INR, PTP, APTT in the last 72 hours. No lab exists for component: INREXT   No results for input(s): FE, TIBC, PSAT, FERR in the last 72 hours. No results found for: FOL, RBCF   No results for input(s): PH, PCO2, PO2 in the last 72 hours. No results for input(s): CPK, CKMB in the last 72 hours.     No lab exists for component: TROPONINI  No components found for: [place]  Lab Results   Component Value Date/Time    Color YELLOW/STRAW 06/28/2016 06:27 AM    Appearance CLEAR 06/28/2016 06:27 AM    Specific gravity 1.018 06/28/2016 06:27 AM    pH (UA) 7.0 06/28/2016 06:27 AM    Protein NEGATIVE  06/28/2016 06:27 AM    Glucose NEGATIVE  06/28/2016 06:27 AM    Ketone NEGATIVE  06/28/2016 06:27 AM    Bilirubin NEGATIVE  06/28/2016 06:27 AM    Urobilinogen 0.2 06/28/2016 06:27 AM    Nitrites NEGATIVE  06/28/2016 06:27 AM    Leukocyte Esterase NEGATIVE  06/28/2016 06:27 AM    Epithelial cells FEW 06/28/2016 06:27 AM    Bacteria NEGATIVE  06/28/2016 06:27 AM    WBC 0-4 06/28/2016 06:27 AM    RBC 0-5 06/28/2016 06:27 AM       MEDICATIONS:  Current Facility-Administered Medications   Medication Dose Route Frequency    lactulose (CHRONULAC) solution 20 g  20 g Rectal BID    pantoprazole (PROTONIX) tablet 20 mg  20 mg Oral DAILY    sodium chloride 0.225 % in sterile water 1,000 mL infusion   IntraVENous CONTINUOUS    aspirin chewable tablet 81 mg  81 mg Oral QHS    . PHARMACY TO SUBSTITUTE PER PROTOCOL    Per Protocol    polyethylene glycol (MIRALAX) packet 17 g  17 g Oral QHS    latanoprost (XALATAN) 0.005 % ophthalmic solution 1 Drop  1 Drop Both Eyes QHS    sodium chloride (NS) flush 5-10 mL  5-10 mL IntraVENous Q8H    sodium chloride (NS) flush 5-10 mL  5-10 mL IntraVENous PRN    acetaminophen (TYLENOL) tablet 650 mg  650 mg Oral Q4H PRN    Or    acetaminophen (TYLENOL) solution 650 mg  650 mg Per NG tube Q4H PRN    Or    acetaminophen (TYLENOL) suppository 650 mg  650 mg Rectal Q4H PRN    dextrose 5% infusion  125 mL/hr IntraVENous CONTINUOUS    rivastigmine (EXELON) 9.5 mg/24 hr patch 1 Patch  1 Patch TransDERmal DAILY

## 2017-12-17 NOTE — CONSULTS
[facility]  Pulmonary, Critical Care, and Sleep Medicine    Initial Patient Consult    Name: Camilla Denis MRN: [id number]   : 1940 Hospital: [facility] [address]   Date: 2017        IMPRESSION:   · LLL lung nodule 1.6 cm- worrisome for neoplasia- certainly can be a peripheral lung ca like adenoCa. · Gen weakness- symptomatic parkinsons >>> paraneoplastic syndrome like LEMS. · Hypernatremia  · Parkinson's  · Weight loss  · Malnutrition  · CAD      RECOMMENDATIONS:   · Will ask Rads do a CT- FNA LLL nodule tomorrow  · consider MG/LEMS abs. But more likely pt with symptomatic parkinsons due to poor intake meds  · Fluid mgmt per renal  · SUP  · DVT proph     Subjective: This patient has been seen and evaluated at the request of Dr. Franki Washburn for Lung mass. Patient is a 68 y.o. male   With parkinsons admitted 12/15 for hypernatremia, 3 weeks gen weakness and weight loss and now bed bound. Na was 158. Renal consulted. Pt is non verbal , alert but does not follow commands. On RA and protecting his airway. Past Medical History:   Diagnosis Date    CAD (coronary artery disease)     Hypercholesteremia     Osteoporosis     Parkinson disease ([address]       Past Surgical History:   Procedure Laterality Date    CARDIAC SURG PROCEDURE UNLIST      bipass x 2    HX PROSTATECTOMY        Prior to Admission medications    Medication Sig Start Date End Date Taking? Authorizing Provider   rivastigmine (EXELON) 9.5 mg/24 hr patch 1 Patch by TransDERmal route daily. Applies at 1700   Yes Historical Provider   pantoprazole (PROTONIX) 20 mg tablet Take 20 mg by mouth daily. Yes Historical Provider   acetaminophen (TYLENOL) 160 mg/5 mL liquid Take  by mouth every six (6) hours as needed for Pain. Yes Historical Provider   fludrocortisone (FLORINEF) 0.1 mg tablet Take 0.2 mg by mouth daily.    Yes Historical Provider   MULTIVIT-MIN/FA/LYCOPEN/LUTEIN (CENTRUM SILVER ULTRA MEN'S PO) Take 1 Tab by mouth daily. Yes Historical Provider   pimavanserin (NUPLAZID) 17 mg tab Take 2 Tabs by mouth daily. Yes Historical Provider   Calcium-Cholecalciferol, D3, 600 mg(1,500mg) -400 unit cap Take 1 Cap by mouth two (2) times a day. Yes Historical Provider   polyethylene glycol (MIRALAX) 17 gram/dose powder Take 17 g by mouth nightly. Yes Historical Provider   latanoprost (XALATAN) 0.005 % ophthalmic solution Administer 1 Drop to both eyes nightly. Yes Historical Provider   alendronate (FOSAMAX) 70 mg tablet Take 70 mg by mouth Every Saturday. Yes Fortino Mcgarry MD   aspirin 81 mg tablet Take 81 mg by mouth nightly. Yes Fortino Mcgarry MD     No Known Allergies   Social History   Substance Use Topics    Smoking status: Former Smoker    Smokeless tobacco: Not on file    Alcohol use No      History reviewed. No pertinent family history. Current Facility-Administered Medications   Medication Dose Route Frequency    dextrose 5 % - 0.2% NaCl infusion  150 mL/hr IntraVENous CONTINUOUS    lactulose (CHRONULAC) solution 20 g  20 g Rectal BID    pantoprazole (PROTONIX) tablet 20 mg  20 mg Oral DAILY    aspirin chewable tablet 81 mg  81 mg Oral QHS    polyethylene glycol (MIRALAX) packet 17 g  17 g Oral QHS    latanoprost (XALATAN) 0.005 % ophthalmic solution 1 Drop  1 Drop Both Eyes QHS    sodium chloride (NS) flush 5-10 mL  5-10 mL IntraVENous Q8H    rivastigmine (EXELON) 9.5 mg/24 hr patch 1 Patch  1 Patch TransDERmal DAILY       Review of Systems:  Review of systems not obtained due to patient factors.     Objective:   Vital Signs:    Visit Vitals    /47 (BP 1 Location: Left arm, BP Patient Position: At rest)    Pulse 64    Temp 99.2 °F (37.3 °C)    Resp 16    Ht 6' 1\" (1.854 m)    Wt 55.9 kg (123 lb 4.8 oz)    SpO2 99%    BMI 16.27 kg/m2       O2 Device: Room air       Temp (24hrs), Av.3 °F (36.8 °C), Min:97.9 °F (36.6 °C), Max:99.2 °F (37.3 °C)       Intake/Output:   Last shift: 12/17 0701 - 12/17 1900  In: -   Out: 1100 [Urine:1100]  Last 3 shifts: 12/15 1901 - 12/17 0700  In: 2505.8 [I.V.:2505.8]  Out: 1200 [Urine:1200]    Intake/Output Summary (Last 24 hours) at 12/17/17 1209  Last data filed at 12/17/17 1158   Gross per 24 hour   Intake          2000.83 ml   Output             2300 ml   Net          -299.17 ml      Physical Exam:   General:  Alert,non verbal   Head:  Normocephalic, without obvious abnormality, atraumatic. Eyes:  Conjunctivae/corneas clear. PERRL,        Throat: Lips, mucosa, and tongue normal. Teeth and gums normal.   Neck: Supple, symmetrical, trachea midline, no adenopathy       Lungs:   Clear to auscultation bilaterally. Heart:  Regular rate and rhythm, S1, S2 normal, no murmur, click, rub or gallop. Abdomen:   Soft, non-tender. Bowel sounds normal. No masses,  No organomegaly. Extremities: Extremities normal, atraumatic, no cyanosis or edema. Pulses: 2+ and symmetric all extremities.    Skin: Skin color, texture, turgor normal. No rashes or lesions             Data review:     Recent Results (from the past 24 hour(s))   SODIUM, UR, RANDOM    Collection Time: 12/16/17  9:10 PM   Result Value Ref Range    Sodium urine, random 24 MMOL/L   OSMOLALITY, UR    Collection Time: 12/16/17  9:10 PM   Result Value Ref Range    Osmolality,urine 777 MOSM/kg N0U   METABOLIC PANEL, BASIC    Collection Time: 12/17/17  2:46 AM   Result Value Ref Range    Sodium 154 (H) 136 - 145 mmol/L    Potassium 3.4 (L) 3.5 - 5.1 mmol/L    Chloride 118 (H) 97 - 108 mmol/L    CO2 31 21 - 32 mmol/L    Anion gap 5 5 - 15 mmol/L    Glucose 87 65 - 100 mg/dL    BUN 25 (H) 6 - 20 MG/DL    Creatinine 0.77 0.70 - 1.30 MG/DL    BUN/Creatinine ratio 32 (H) 12 - 20      GFR est AA >60 >60 ml/min/1.73m2    GFR est non-AA >60 >60 ml/min/1.73m2    Calcium 8.2 (L) 8.5 - 30.4 MG/DL   METABOLIC PANEL, COMPREHENSIVE    Collection Time: 12/17/17  2:46 AM   Result Value Ref Range    Sodium 154 (H) 136 - 145 mmol/L    Potassium 3.6 3.5 - 5.1 mmol/L    Chloride 117 (H) 97 - 108 mmol/L    CO2 28 21 - 32 mmol/L    Anion gap 9 5 - 15 mmol/L    Glucose 85 65 - 100 mg/dL    BUN 25 (H) 6 - 20 MG/DL    Creatinine 0.75 0.70 - 1.30 MG/DL    BUN/Creatinine ratio 33 (H) 12 - 20      GFR est AA >60 >60 ml/min/1.73m2    GFR est non-AA >60 >60 ml/min/1.73m2    Calcium 7.9 (L) 8.5 - 10.1 MG/DL    Bilirubin, total 1.1 (H) 0.2 - 1.0 MG/DL    ALT (SGPT) 109 (H) 12 - 78 U/L    AST (SGOT) 66 (H) 15 - 37 U/L    Alk. phosphatase 76 45 - 117 U/L    Protein, total 5.5 (L) 6.4 - 8.2 g/dL    Albumin 1.8 (L) 3.5 - 5.0 g/dL    Globulin 3.7 2.0 - 4.0 g/dL    A-G Ratio 0.5 (L) 1.1 - 2.2     CBC WITH AUTOMATED DIFF    Collection Time: 12/17/17  2:46 AM   Result Value Ref Range    WBC 6.6 4.1 - 11.1 K/uL    RBC 3.60 (L) 4.10 - 5.70 M/uL    HGB 11.0 (L) 12.1 - 17.0 g/dL    HCT 34.4 (L) 36.6 - 50.3 %    MCV 95.6 80.0 - 99.0 FL    MCH 30.6 26.0 - 34.0 PG    MCHC 32.0 30.0 - 36.5 g/dL    RDW 14.6 (H) 11.5 - 14.5 %    PLATELET 912 596 - 515 K/uL    NEUTROPHILS 84 (H) 32 - 75 %    LYMPHOCYTES 9 (L) 12 - 49 %    MONOCYTES 4 (L) 5 - 13 %    EOSINOPHILS 3 0 - 7 %    BASOPHILS 0 0 - 1 %    ABS. NEUTROPHILS 5.6 1.8 - 8.0 K/UL    ABS. LYMPHOCYTES 0.6 (L) 0.8 - 3.5 K/UL    ABS. MONOCYTES 0.3 0.0 - 1.0 K/UL    ABS. EOSINOPHILS 0.2 0.0 - 0.4 K/UL    ABS. BASOPHILS 0.0 0.0 - 0.1 K/UL   OSMOLALITY, SERUM/PLASMA    Collection Time: 12/17/17  2:46 AM   Result Value Ref Range    Osmolality, serum/plasma 328 mOsm/kg H2O       Imaging:  I have personally reviewed the patients radiographs and have reviewed the reports:  CT chest with LLL posterior segment 1.6 cm mass no LAD no effusion no ILD changes.          Ziggy Gibbons MD

## 2017-12-17 NOTE — PROGRESS NOTES
Bedside shift change report given to BALJIT Lilly (oncoming nurse) by Nieves Slade (offgoing nurse). Report included the following information SBAR, Kardex, Intake/Output, MAR, Recent Results and Cardiac Rhythm NSR/Sinus Amanda Bautista.

## 2017-12-17 NOTE — INTERDISCIPLINARY ROUNDS
IDR/SLIDR Summary          Patient: Nina Santiago MRN: [id number]    Age: 68 y.o. YOB: 1940 Room/Bed: [facility]   Admit Diagnosis: Weakness  Weakness  Principal Diagnosis: Weakness   Goals: decrease sodium/ammonia levels, PT/OT, d/c planning  Readmission: NO  Quality Measure: Not applicable  VTE Prophylaxis: Mechanical  Influenza Vaccine screening completed? YES  Pneumococcal Vaccine screening completed? NO  Mobility needs: Yes   Nutrition plan:Yes  Consults:P.T, O.T., Speech, Respiratory and Case Management    Financial concerns:No  Escalated to CM? YES  RRAT Score: 10   Interventions:H2H  Testing due for pt today?  YES  LOS: 2 days Expected length of stay 5 days  Discharge plan: home w/ home health   PCP: Joaquín Lopez MD  Transportation needs: Yes    Days before discharge:two or more days before discharge   Discharge disposition: Home    Signed:     Meenakshi Flores  12/17/2017  3:13 AM

## 2017-12-18 ENCOUNTER — APPOINTMENT (OUTPATIENT)
Dept: GENERAL RADIOLOGY | Age: 77
DRG: 981 | End: 2017-12-18
Attending: HOSPITALIST
Payer: MEDICARE

## 2017-12-18 ENCOUNTER — APPOINTMENT (OUTPATIENT)
Dept: CT IMAGING | Age: 77
DRG: 981 | End: 2017-12-18
Attending: INTERNAL MEDICINE
Payer: MEDICARE

## 2017-12-18 LAB
ANION GAP SERPL CALC-SCNC: 7 MMOL/L (ref 5–15)
BUN SERPL-MCNC: 17 MG/DL (ref 6–20)
BUN/CREAT SERPL: 25 (ref 12–20)
CALCIUM SERPL-MCNC: 7.9 MG/DL (ref 8.5–10.1)
CHLORIDE SERPL-SCNC: 112 MMOL/L (ref 97–108)
CO2 SERPL-SCNC: 28 MMOL/L (ref 21–32)
CREAT SERPL-MCNC: 0.69 MG/DL (ref 0.7–1.3)
GLUCOSE SERPL-MCNC: 135 MG/DL (ref 65–100)
POTASSIUM SERPL-SCNC: 3.2 MMOL/L (ref 3.5–5.1)
SODIUM SERPL-SCNC: 147 MMOL/L (ref 136–145)

## 2017-12-18 PROCEDURE — 74000 XR ABD PORT  1 V: CPT

## 2017-12-18 PROCEDURE — 36415 COLL VENOUS BLD VENIPUNCTURE: CPT | Performed by: INTERNAL MEDICINE

## 2017-12-18 PROCEDURE — 74011250637 HC RX REV CODE- 250/637: Performed by: HOSPITALIST

## 2017-12-18 PROCEDURE — 74011250636 HC RX REV CODE- 250/636: Performed by: INTERNAL MEDICINE

## 2017-12-18 PROCEDURE — 74011250637 HC RX REV CODE- 250/637: Performed by: FAMILY MEDICINE

## 2017-12-18 PROCEDURE — 77030020186 HC BOOT HL PROTCT SAGE -B

## 2017-12-18 PROCEDURE — 77030012940 HC DRSG HYDRCOIL BMS -B

## 2017-12-18 PROCEDURE — 80048 BASIC METABOLIC PNL TOTAL CA: CPT | Performed by: INTERNAL MEDICINE

## 2017-12-18 PROCEDURE — 74011250636 HC RX REV CODE- 250/636: Performed by: HOSPITALIST

## 2017-12-18 PROCEDURE — 74011000258 HC RX REV CODE- 258: Performed by: INTERNAL MEDICINE

## 2017-12-18 PROCEDURE — 92610 EVALUATE SWALLOWING FUNCTION: CPT | Performed by: SPEECH-LANGUAGE PATHOLOGIST

## 2017-12-18 PROCEDURE — 65660000000 HC RM CCU STEPDOWN

## 2017-12-18 PROCEDURE — 97530 THERAPEUTIC ACTIVITIES: CPT

## 2017-12-18 PROCEDURE — 97165 OT EVAL LOW COMPLEX 30 MIN: CPT

## 2017-12-18 RX ORDER — DEXTROSE, SODIUM CHLORIDE, AND POTASSIUM CHLORIDE 5; .2; .15 G/100ML; G/100ML; G/100ML
125 INJECTION INTRAVENOUS CONTINUOUS
Status: DISCONTINUED | OUTPATIENT
Start: 2017-12-18 | End: 2017-12-19

## 2017-12-18 RX ADMIN — MORPHINE SULFATE 2 MG: 2 INJECTION, SOLUTION INTRAMUSCULAR; INTRAVENOUS at 06:41

## 2017-12-18 RX ADMIN — LATANOPROST 1 DROP: 50 SOLUTION OPHTHALMIC at 21:48

## 2017-12-18 RX ADMIN — MORPHINE SULFATE 2 MG: 2 INJECTION, SOLUTION INTRAMUSCULAR; INTRAVENOUS at 17:16

## 2017-12-18 RX ADMIN — ACETAMINOPHEN 650 MG: 650 SUPPOSITORY RECTAL at 20:47

## 2017-12-18 RX ADMIN — POTASSIUM CHLORIDE, DEXTROSE MONOHYDRATE AND SODIUM CHLORIDE 125 ML/HR: 150; 5; 200 INJECTION, SOLUTION INTRAVENOUS at 11:59

## 2017-12-18 RX ADMIN — LACTULOSE 1000 ML: 10 SOLUTION ORAL at 11:20

## 2017-12-18 RX ADMIN — DEXTROSE MONOHYDRATE AND SODIUM CHLORIDE 150 ML/HR: 5; .225 INJECTION, SOLUTION INTRAVENOUS at 06:42

## 2017-12-18 RX ADMIN — Medication 10 ML: at 21:53

## 2017-12-18 RX ADMIN — Medication 10 ML: at 06:23

## 2017-12-18 RX ADMIN — POTASSIUM CHLORIDE, DEXTROSE MONOHYDRATE AND SODIUM CHLORIDE 125 ML/HR: 150; 5; 200 INJECTION, SOLUTION INTRAVENOUS at 21:48

## 2017-12-18 RX ADMIN — LACTULOSE 1000 ML: 10 SOLUTION ORAL at 20:47

## 2017-12-18 RX ADMIN — MORPHINE SULFATE 2 MG: 2 INJECTION, SOLUTION INTRAMUSCULAR; INTRAVENOUS at 21:53

## 2017-12-18 RX ADMIN — Medication 10 ML: at 15:40

## 2017-12-18 NOTE — ROUTINE PROCESS
Bedside and Verbal shift change report given to [address] [place] [address] Street (oncoming nurse) by Gifty Agarwal (offgoing nurse). Report included the following information SBAR, Kardex, Procedure Summary, Intake/Output, MAR, Accordion, Recent Results, Med Rec Status and Cardiac Rhythm NSR/Sinus Amanda Bautista.

## 2017-12-18 NOTE — PROGRESS NOTES
Hospitalist Progress Note  Derrick Reed MD  Answering service: [phone] OR [phone] from in house phone      Date of Service:  2017  NAME:  Juanetta Duane  :  1940  MRN:  [id number]      Admission Summary:   Patient admitted for weight loss and failure to thrive    Interval history / Subjective:       Patient seen ,he Is having a weak Voice, he is not in distress, He is npo. 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 Neurology consulted - continue asa  Speech eval - Recommends NPO for now  Will place NG tube for medications  Dietary consult      Unintentional Weight loss  CT chest showing possible lung nodule/mass - consulted pulmonary  Lung biopsy - Ct guided on , however Evaluated by radiology and doubt this Is a true lung tumor and recommends follow up ct in 6 months    Failure to thrive and dysphagia with moderate protein calorie nutrition  Orderd NG for feeding  I have also placed palliative consult to discuss with family regarding goals of care. Sacral ulcer - unstageable on admission  Wound care consult    Metabolic encephalopathy from Hypernatremia on admission     Hypernatremia  improving  Could be related to reset osmostat in mineralocorticoid use  Hold Florinef  Dextrose IVF  Renal Following    Code status: Full  DVT prophylaxis: SCD    Care Plan discussed with: Patient/Family  Disposition: TBD    - d/w patients wife and updated on plan, She is concerned about his Parkinson medication, I have called pharmacy and verified its not here.  RN will call ED to see if the medication is there, she also reports that he is not on sinemet anymore as he had intolerance to it.    - updated wife at bedside on plan of care  - NG placed     Hospital Problems  Date Reviewed: 12/15/2017          Codes Class Noted POA    * (Principal)Weakness ICD-10-CM: R53.1  ICD-9-CM: 780.79  12/15/2017 Yes                Review of Systems:   Review of systems not obtained due to patient factors. Vital Signs:    Last 24hrs VS reviewed since prior progress note. Most recent are:  Visit Vitals    /53 (BP 1 Location: Right arm, BP Patient Position: At rest)    Pulse (!) 54    Temp 99.2 °F (37.3 °C)    Resp 15    Ht 6' 1\" (1.854 m)    Wt 52.8 kg (116 lb 4.8 oz)    SpO2 100%    BMI 15.34 kg/m2         Intake/Output Summary (Last 24 hours) at 12/18/17 0743  Last data filed at 12/17/17 1930   Gross per 24 hour   Intake                0 ml   Output             1425 ml   Net            -1425 ml        Physical Examination:             Constitutional:  No acute distress, cooperative, pleasant   ENT:  Oral mucous Dry, oropharynx benign. Neck supple,    Resp:  CTA bilaterally. No wheezing/rhonchi/rales. No accessory muscle use   CV:  Regular rhythm, normal rate, no murmurs, gallops, rubs    GI:  Soft, non distended, non tender. normoactive bowel sounds, no hepatosplenomegaly     Musculoskeletal:  No edema, warm, 2+ pulses throughout    Neurologic:  Moves all extremities.   AAOx3, CN II-XII reviewed     Psych:  Pleasant       Data Review:    Review and/or order of clinical lab test  Review and/or order of tests in the radiology section of CPT  Decision to obtain old records and/or obtain history from someone other than the patient      Labs:     Recent Labs      12/17/17   0246  12/15/17   1517   WBC  6.6  7.9   HGB  11.0*  12.9   HCT  34.4*  40.0   PLT  192  206     Recent Labs      12/18/17   0445  12/17/17   1345  12/17/17   0246  12/16/17   0515   NA  147*  152*  154*  154*  158*   K  3.2*   --   3.6  3.4*  3.4*   CL  112*   --   117*  118*  119*   CO2  28   --   28  31  32   BUN  17   --   25*  25*  25*   CREA  0.69*   --   0.75  0.77  0.80   GLU  135*   --   85  87  123*   CA  7.9* --   7.9*  8.2*  8.9     Recent Labs      12/17/17   0246  12/15/17   1517   SGOT  66*  87*   ALT  109*  152*   AP  76  83   TBILI  1.1*  0.3   TP  5.5*  6.9   ALB  1.8*  2.0*   GLOB  3.7  4.9*     No results for input(s): INR, PTP, APTT in the last 72 hours. No lab exists for component: INREXT, INREXT   No results for input(s): FE, TIBC, PSAT, FERR in the last 72 hours. No results found for: FOL, RBCF   No results for input(s): PH, PCO2, PO2 in the last 72 hours. Recent Labs      12/15/17   1517   TROIQ  <0.04     Lab Results   Component Value Date/Time    Cholesterol, total 106 12/16/2017 05:15 AM    HDL Cholesterol 26 12/16/2017 05:15 AM    LDL, calculated 58.4 12/16/2017 05:15 AM    Triglyceride 108 12/16/2017 05:15 AM    CHOL/HDL Ratio 4.1 12/16/2017 05:15 AM     Lab Results   Component Value Date/Time    Glucose (POC) 91 11/27/2009 12:20 PM     Lab Results   Component Value Date/Time    Color YELLOW/STRAW 06/28/2016 06:27 AM    Appearance CLEAR 06/28/2016 06:27 AM    Specific gravity 1.018 06/28/2016 06:27 AM    pH (UA) 7.0 06/28/2016 06:27 AM    Protein NEGATIVE  06/28/2016 06:27 AM    Glucose NEGATIVE  06/28/2016 06:27 AM    Ketone NEGATIVE  06/28/2016 06:27 AM    Bilirubin NEGATIVE  06/28/2016 06:27 AM    Urobilinogen 0.2 06/28/2016 06:27 AM    Nitrites NEGATIVE  06/28/2016 06:27 AM    Leukocyte Esterase NEGATIVE  06/28/2016 06:27 AM    Epithelial cells FEW 06/28/2016 06:27 AM    Bacteria NEGATIVE  06/28/2016 06:27 AM    WBC 0-4 06/28/2016 06:27 AM    RBC 0-5 06/28/2016 06:27 AM     All Micro Results     Procedure Component Value Units Date/Time    URINE CULTURE HOLD SAMPLE [[id number]] Collected:  12/15/17 3344    Order Status:  Canceled Specimen:  Urine         Xr Chest Sngl V    Result Date: 12/15/2017  IMPRESSION: No acute process. Mri Brain Wo Cont    Result Date: 12/16/2017  IMPRESSION: 1. Old left mo lacunar infarct.  2. Age-related findings within the upper limits of normal. 3. No acute intracranial abnormality demonstrated. Ct Head Wo Cont    Result Date: 12/15/2017  IMPRESSION: 1. There is no acute intracranial hemorrhage. 2. Apparent area of low attenuation in the left aspect of the spines may represent an age-indeterminate infarct. Further evaluation with MRI is suggested if clinically warranted. The findings were discussed with Dr. Inez Carbajal on 12/15/2017 at 1600 hours by Dr. Chrystal Montes. 789     Ct Chest Wo Cont    Result Date: 12/16/2017  IMPRESSION: Findings concerning for pulmonary neoplasm in this clinical setting, left lower lobe masslike lesion 16 x 27 mm in size. PET CT prior to sampling as this may represent rounded atelectasis/chronic parenchymal change. Lesion is amenable to percutaneous sampling if clinically warranted. No acute intraperitoneal process is identified. Renal hypodensities are consistent with simple cyst.    Ct Abd Pelv Wo Cont    Result Date: 12/16/2017  IMPRESSION: Findings concerning for pulmonary neoplasm in this clinical setting, left lower lobe masslike lesion 16 x 27 mm in size. PET CT prior to sampling as this may represent rounded atelectasis/chronic parenchymal change. Lesion is amenable to percutaneous sampling if clinically warranted. No acute intraperitoneal process is identified.  Renal hypodensities are consistent with simple cyst.      Medications Reviewed:     Current Facility-Administered Medications   Medication Dose Route Frequency    dextrose 5 % - 0.2% NaCl infusion  150 mL/hr IntraVENous CONTINUOUS    lactulose enema in sterile water  1,000 mL Rectal BID    morphine injection 2 mg  2 mg IntraVENous Q4H PRN    pantoprazole (PROTONIX) tablet 20 mg  20 mg Oral DAILY    aspirin chewable tablet 81 mg  81 mg Oral QHS    pimavanserin tab 34 mg (Patient Supplied)  34 mg Oral DAILY    polyethylene glycol (MIRALAX) packet 17 g  17 g Oral QHS    latanoprost (XALATAN) 0.005 % ophthalmic solution 1 Drop  1 Drop Both Eyes QHS    sodium chloride (NS) flush 5-10 mL  5-10 mL IntraVENous Q8H    sodium chloride (NS) flush 5-10 mL  5-10 mL IntraVENous PRN    acetaminophen (TYLENOL) tablet 650 mg  650 mg Oral Q4H PRN    Or    acetaminophen (TYLENOL) solution 650 mg  650 mg Per NG tube Q4H PRN    Or    acetaminophen (TYLENOL) suppository 650 mg  650 mg Rectal Q4H PRN    rivastigmine (EXELON) 9.5 mg/24 hr patch 1 Patch  1 Patch TransDERmal DAILY     ______________________________________________________________________  EXPECTED LENGTH OF STAY: - - -  ACTUAL LENGTH OF STAY:          3                 Pb Rosenberg MD

## 2017-12-18 NOTE — PROGRESS NOTES
Pulmonary, Critical Care, and Sleep Medicine~Progress Note    Name: Timmy Prasad MRN: [id number]   : 1940 Hospital[facility]   Date: 2017 9:14 AM Admission: 12/15/2017     IMPRESSION:   · 1.6cm LLL medial/peripherial nodule; worrisome for neoplasia   · Generalized weakness that seems to have been progressive for several weeks   · Hypernatremia, improving  · Parkinson's disease   · Malnutrition   · CAD         PLAN:   · Noted order for CT-FNA today. · If radiology unable to complete we could consider Thiago bronch if family wishes. Additionally it is not unreasonable to consider radiation to that site with the high likelihood of neoplasia   · Protonix/ SCDs  · O2 titration above 90%  · Discussed with attending     Daily Progression:    Does not appear in distress     I have reviewed the labs and previous days notes.     Unable to complete ROS secondary to patient factors     OBJECTIVE:     Vital Signs:       Visit Vitals    /53 (BP 1 Location: Right arm, BP Patient Position: At rest)    Pulse (!) 54    Temp 99.2 °F (37.3 °C)    Resp 15    Ht 6' 1\" (1.854 m)    Wt 52.8 kg (116 lb 4.8 oz)    SpO2 100%    BMI 15.34 kg/m2      Temp (24hrs), Av.1 °F (37.3 °C), Min:99 °F (37.2 °C), Max:99.2 °F (37.3 °C)     Intake/Output:     Last shift:      Last 3 shifts:  1901 -  0700  In: 2000.8 [I.V.:.8]  Out: 2625 [Urine:2625]        Intake/Output Summary (Last 24 hours) at 17 0914  Last data filed at 17 1930   Gross per 24 hour   Intake                0 ml   Output             1425 ml   Net            -1425 ml       Physical Exam:                                        Exam Findings Other   General: No resp distress noted, appears stated age    HEENT:  No ulcers, JVD not elevated, no cervical LAD    Chest: No pectus deformity, normal chest rise b/l    HEART:  RRR, no murmurs/rubs/gallops Lungs:  CTA b/l, no rhonchi/crackles/wheeze, diminished BS at bases    ABD: Soft/NT, non rigid mildly distended    EXT: No cyanosis/clubbing/edema, normal peripheral pulses    Skin: No rashes or ulcers, no mottling    Neuro: Alert. No verbal         Medications:  Current Facility-Administered Medications   Medication Dose Route Frequency    dextrose 5% - 0.2% NaCl with KCl 20 mEq/L infusion  125 mL/hr IntraVENous CONTINUOUS    lactulose enema in sterile water  1,000 mL Rectal BID    morphine injection 2 mg  2 mg IntraVENous Q4H PRN    pantoprazole (PROTONIX) tablet 20 mg  20 mg Oral DAILY    aspirin chewable tablet 81 mg  81 mg Oral QHS    pimavanserin tab 34 mg (Patient Supplied)  34 mg Oral DAILY    polyethylene glycol (MIRALAX) packet 17 g  17 g Oral QHS    latanoprost (XALATAN) 0.005 % ophthalmic solution 1 Drop  1 Drop Both Eyes QHS    sodium chloride (NS) flush 5-10 mL  5-10 mL IntraVENous Q8H    sodium chloride (NS) flush 5-10 mL  5-10 mL IntraVENous PRN    acetaminophen (TYLENOL) tablet 650 mg  650 mg Oral Q4H PRN    Or    acetaminophen (TYLENOL) solution 650 mg  650 mg Per NG tube Q4H PRN    Or    acetaminophen (TYLENOL) suppository 650 mg  650 mg Rectal Q4H PRN    rivastigmine (EXELON) 9.5 mg/24 hr patch 1 Patch  1 Patch TransDERmal DAILY       Labs:  ABG No results for input(s): PHI, PCO2I, PO2I, HCO3I, SO2I, FIO2I in the last 72 hours.      CBC Recent Labs      12/17/17   0246  12/15/17   1517   WBC  6.6  7.9   HGB  11.0*  12.9   HCT  34.4*  40.0   PLT  192  206   MCV  95.6  97.1   MCH  30.6  76.9        Metabolic  Panel Recent Labs      12/18/17   0445  12/17/17   1345  12/17/17   0246  12/16/17   0515   12/15/17   1517   NA  147*  152*  154*  154*  158*   < >  157*   K  3.2*   --   3.6  3.4*  3.4*   < >  3.7   CL  112*   --   117*  118*  119*   < >  119*   CO2  28   --   28  31  32   < >  30   GLU  135*   --   85  87  123*   < >  133*   BUN  17   --   25*  25*  25*   < >  31* CREA  0.69*   --   0.75  0.77  0.80   < >  0.89   CA  7.9*   --   7.9*  8.2*  8.9   < >  8.9   ALB   --    --   1.8*   --    --   2.0*   SGOT   --    --   66*   --    --   87*   ALT   --    --   109*   --    --   152*    < > = values in this interval not displayed.         Pertinent Labs                Mary Vela  12/18/2017

## 2017-12-18 NOTE — PROGRESS NOTES
Problem: Falls - Risk of  Goal: *Absence of Falls  Document Ora Fall Risk and appropriate interventions in the flowsheet.    Outcome: Progressing Towards Goal  Fall Risk Interventions:  Mobility Interventions: OT consult for ADLs, PT Consult for mobility concerns, PT Consult for assist device competence    Mentation Interventions: Adequate sleep, hydration, pain control, Door open when patient unattended, Increase mobility, More frequent rounding    Medication Interventions: Bed/chair exit alarm, Evaluate medications/consider consulting pharmacy, Patient to call before getting OOB, Teach patient to arise slowly, Utilize gait belt for transfers/ambulation    Elimination Interventions: Call light in reach, Toileting schedule/hourly rounds

## 2017-12-18 NOTE — PROGRESS NOTES
Problem: Self Care Deficits Care Plan (Adult)  Goal: *Acute Goals and Plan of Care (Insert Text)  Occupational Therapy Goals  Initiated 12/18/2017    1. Patient will perform simple grooming tasks with overall Mod A x1 within 7 days. 2.  Patient will participate in anterior upper body bathing with overall Mod A x1 within 7 days. 3.  Patient will perform rolling in bed with Mod A x2 in preparation for toileting and hygiene within 7 days. 4.  Patient will complete toilet transfer with overall Mod A x2 within 7 days. Occupational Therapy EVALUATION  Patient: Lidya Olivier (61 y.o. male)  Date: 12/18/2017  Primary Diagnosis: Weakness  Weakness        Precautions: Fall; NPO       ASSESSMENT :  Based on the objective data described below, the patient presents with overall Max-Total A x2 for functional mobility and up to Total A for all ADLs. Patient received supine in bed, RN present. Patient withdrawn, no command following, significant flexor contractures noted in bilateral elbows/knees, resisting all PROM, unable to keep eyes open, significantly delayed cognitive processing, tearful with mobility, increased pain, and inability to participate in any ADLs at this time. Attempted to initiate functional mobility to EOB with PT present, patient tearful, crying out, unable to verbalize pain, and resisting all movement; transfer aborted d/t these reasons. Attempted to complete BUE ROM to increase patient's ability to complete simple ADLs, resisting all movement despite cues and education. Patient positioned in bed L sidelying with pillows to reduce pressure on significant R sacral ulcer. Session aborted. Per interdisciplinary rounds, patient's family declining rehab and plan to take him home with HHOT/PT and 24/7supervision/assist..    Patient will benefit from skilled intervention to address the above impairments.   Patients rehabilitation potential is considered to be Fair  Factors which may influence rehabilitation potential include:   []             None noted  []             Mental ability/status  [x]             Medical condition - Parkinson's; has been off meds for few days  []             Home/family situation and support systems  []             Safety awareness  []             Pain tolerance/management  []             Other:      PLAN :  Recommendations and Planned Interventions:  [x]               Self Care Training                  [x]        Therapeutic Activities  [x]               Functional Mobility Training    [x]        Cognitive Retraining  [x]               Therapeutic Exercises           [x]        Endurance Activities  [x]               Balance Training                   [x]        Neuromuscular Re-Education  [x]               Visual/Perceptual Training     [x]   Home Safety Training  [x]               Patient Education                 [x]        Family Training/Education  []               Other (comment):    Frequency/Duration: Patient will be followed by occupational therapy 3 times a week to address goals. Discharge Recommendations: [facility] with 24/7 supervision/assist - family declining rehab  Further Equipment Recommendations for Discharge: Hospital bed, sukumar lift, wheelchair, bedpan, urinal     SUBJECTIVE:   Patient stated Yes.  Patient otherwise nonverbal    OBJECTIVE DATA SUMMARY:   HISTORY:   Past Medical History:   Diagnosis Date    CAD (coronary artery disease)     Hypercholesteremia     Osteoporosis     Parkinson disease ([address]      Past Surgical History:   Procedure Laterality Date    CARDIAC SURG PROCEDURE UNLIST      bipass x 2    HX PROSTATECTOMY         Prior Level of Function/Environment/Context: Patient unable to provide history, no family present. Per RN and IDR rounds, patient was ambulatory 3 weeks ago and completing ADLs with assist as needed, participating in [address] . Patient has been nonambulatory and slowly declining over last 3 weeks. Baseline Parkinson's.   Occupations in which the patient is/was successful, what are the barriers preventing that success:   Performance Patterns (routines, roles, habits, and rituals):   Personal Interests and/or values:   Expanded or extensive additional review of patient history:     Home Situation  Home Environment: Private residence  # Steps to Enter: 4  One/Two Story Residence: Two story  Living Alone: No  Support Systems: Child(bear), Family member(s), Friends \ neighbors, Home care staff, Spouse/Significant Other/Partner  Patient Expects to be Discharged to[de-identified] Private residence  Current DME Used/Available at Home: Ale Lorie, rollator, Wheelchair  []  Right hand dominant   []  Left hand dominant    EXAMINATION OF PERFORMANCE DEFICITS:  Cognitive/Behavioral Status:  Neurologic State: Alert;Confused  Orientation Level: Unable to verbalize  Cognition: No command following;Decreased attention/concentration        Safety/Judgement: Not assessed    Skin: Multiple skin lesions (medial knees, sacrum. Kellee Toby ); please see wound care note for further detail    Edema: None noted in BUEs     Hearing: Auditory  Auditory Impairment: None    Vision/Perceptual:    Tracking: Unable to test secondary due to decreased visual attention                                Range of Motion:  AROM: Grossly decreased, non-functional  PROM: Grossly decreased, non-functional    Strength:  Strength: Grossly decreased, non-functional       Coordination:  Coordination: Grossly decreased, non-functional  Fine Motor Skills-Upper: Left Impaired;Right Impaired    Gross Motor Skills-Upper: Left Impaired;Right Impaired    Tone & Sensation:  Tone: Abnormal (Rigidity and contractures in all limbs)       Balance:  Sitting: Impaired    Functional Mobility and Transfers for ADLs:  Bed Mobility:  Rolling: Maximum assistance;Assist x2    Transfers: Toilet Transfer : Total assistance (Matias and brief/bedpan)    ADL Assessment:  Feeding: Total assistance    Oral Facial Hygiene/Grooming:  Total assistance    Bathing: Total assistance    Upper Body Dressing: Total assistance    Lower Body Dressing: Total assistance    Toileting: Total assistance   * Inferred per obs of BUE ROM, command following, activity tolerance, and functional mobility             ADL Intervention and task modifications:    Cognitive Retraining  Safety/Judgement: Not assessed    Functional Measure:  Barthel Index:    Bathin  Bladder: 0  Bowels: 0  Groomin  Dressin  Feedin  Mobility: 0  Stairs: 0  Toilet Use: 0  Transfer (Bed to Chair and Back): 0  Total: 0       Barthel and G-code impairment scale:  Percentage of impairment CH  0% CI  1-19% CJ  20-39% CK  40-59% CL  60-79% CM  80-99% CN  100%   Barthel Score 0-100 100 99-80 79-60 59-40 20-39 1-19   0   Barthel Score 0-20 20 17-19 13-16 9-12 5-8 1-4 0      The Barthel ADL Index: Guidelines  1. The index should be used as a record of what a patient does, not as a record of what a patient could do. 2. The main aim is to establish degree of independence from any help, physical or verbal, however minor and for whatever reason. 3. The need for supervision renders the patient not independent. 4. A patient's performance should be established using the best available evidence. Asking the patient, friends/relatives and nurses are the usual sources, but direct observation and common sense are also important. However direct testing is not needed. 5. Usually the patient's performance over the preceding 24-48 hours is important, but occasionally longer periods will be relevant. 6. Middle categories imply that the patient supplies over 50 per cent of the effort. 7. Use of aids to be independent is allowed. Judith Amador., Barthel, D.W. (8255). Functional evaluation: the Barthel Index. [address] [facility] (14)2. aMryann Santiago edward KYLEIGH Wright, Fernanda Forbes.Shea, [facility], [address] [place], [place] [address]pro ().  Measuring the change indisability after inpatient rehabilitation; comparison of the responsiveness of the Barthel Index and Functional [place] Measure. Journal of Neurology, Neurosurgery, and Psychiatry, 66(4), 210-269. IBAN Pereyra, RASHAWN Trevino, & [facility] M.A. (2004.) Assessment of post-stroke quality of life in cost-effectiveness studies: The usefulness of the Barthel Index and the EuroQoL-5D. Quality of Life Research, 13, 323-18         G codes: In compliance with CMSs Claims Based Outcome Reporting, the following G-code set was chosen for this patient based on their primary functional limitation being treated: The outcome measure chosen to determine the severity of the functional limitation was the Barthel Index with a score of 0/100 which was correlated with the impairment scale. ? Self Care:     - CURRENT STATUS: CN - 100% impaired, limited or restricted    - GOAL STATUS: CM - 80%-99% impaired, limited or restricted    - D/C STATUS:  ---------------To be determined---------------     Occupational Therapy Evaluation Charge Determination   History Examination Decision-Making   LOW Complexity : Brief history review  HIGH Complexity : 5 or more performance deficits relating to physical, cognitive , or psychosocial skils that result in activity limitations and / or participation restrictions HIGH Complexity : Patient presents with comorbidities that affect occupational performance. Signifigant modification of tasks or assistance (eg, physical or verbal) with assessment (s) is necessary to enable patient to complete evaluation       Based on the above components, the patient evaluation is determined to be of the following complexity level: LOW   Pain:  Pain Scale 1: Adult Nonverbal Pain Scale  Pain Intensity 1: 0              Activity Tolerance:   Poor. VSS  Please refer to the flowsheet for vital signs taken during this treatment.   After treatment:   [] Patient left in no apparent distress sitting up in chair  [x] Patient left in no apparent distress in bed  [x] Call bell left within reach  [x] Nursing notified  [] Caregiver present  [] Bed alarm activated    COMMUNICATION/EDUCATION:   The patients plan of care was discussed with: Physical Therapist and Registered Nurse.  [] Home safety education was provided and the patient/caregiver indicated understanding. [] Patient/family have participated as able in goal setting and plan of care. [] Patient/family agree to work toward stated goals and plan of care. [] Patient understands intent and goals of therapy, but is neutral about his/her participation. [x] Patient is unable to participate in goal setting and plan of care. This patients plan of care is appropriate for delegation to [facility].     Thank you for this referral.  Boom Kirk OT  Time Calculation: 11 mins

## 2017-12-18 NOTE — PROGRESS NOTES
Bedside shift change report given to BALJIT Watson (oncoming nurse) by Tucker Dean  (offgoing nurse). Report included the following information SBAR, Kardex, Intake/Output, MAR, Recent Results and Cardiac Rhythm NSR.

## 2017-12-18 NOTE — PROGRESS NOTES
NAME: Sherri Bloch        :  1940        MRN:  [id number]        Assessment :    Plan:  --Hypernatremia  Hypokalemia  Generalized weakness  Weight loss  Dehyration Sodium better (158 ->>147 today); Add 20meq/L KCl to D5 0.2% saline fluid and drop rate to 125cc/hr    Continue correcting free water deficit    Speech +Swallow evaluation    AM labs           Subjective:     Chief Complaint:  Remains nonverbal    Review of Systems:    Symptom Y/N Comments  Symptom Y/N Comments   Fever/Chills    Chest Pain     Poor Appetite    Edema     Cough    Abdominal Pain     Sputum    Joint Pain     SOB/ZAMUDIO    Pruritis/Rash     Nausea/vomit    Tolerating PT/OT     Diarrhea    Tolerating Diet     Constipation    Other       Could not obtain due to: See above     Objective:     VITALS:   Last 24hrs VS reviewed since prior progress note. Most recent are:  Visit Vitals    /53 (BP 1 Location: Right arm, BP Patient Position: At rest)    Pulse (!) 54    Temp 99.2 °F (37.3 °C)    Resp 15    Ht 6' 1\" (1.854 m)    Wt 52.8 kg (116 lb 4.8 oz)    SpO2 100%    BMI 15.34 kg/m2       Intake/Output Summary (Last 24 hours) at 17 0845  Last data filed at 17 1930   Gross per 24 hour   Intake                0 ml   Output             1425 ml   Net            -1425 ml      Telemetry Reviewed:     PHYSICAL EXAM:  General: Thin, chronically ill appearing  Resp:  CTA Bilaterally. No Wheezing/Rhonchi/Rales. No access. muscle use  CV:  Regular  rhythm,  No murmur (), No Rubs, No Gallops.  No edema  GI:  Soft, Non distended, Non tender.  +Bowel sounds, no HSM      Lab Data Reviewed: (see below)    Medications Reviewed: (see below)    PMH/SH reviewed - no change compared to H&P  ________________________________________________________________________  Care Plan discussed with:  Patient     Family      RN y    Care Manager Consultant:          Comments   >50% of visit spent in counseling and coordination of care       ________________________________________________________________________  Eve Camejo MD     Procedures: see electronic medical records for all procedures/Xrays and details which  were not copied into this note but were reviewed prior to creation of Plan. LABS:  Recent Labs      12/17/17   0246  12/15/17   1517   WBC  6.6  7.9   HGB  11.0*  12.9   HCT  34.4*  40.0   PLT  192  206     Recent Labs      12/18/17   0445  12/17/17   1345  12/17/17   0246  12/16/17   0515   NA  147*  152*  154*  154*  158*   K  3.2*   --   3.6  3.4*  3.4*   CL  112*   --   117*  118*  119*   CO2  28   --   28  31  32   BUN  17   --   25*  25*  25*   CREA  0.69*   --   0.75  0.77  0.80   GLU  135*   --   85  87  123*   CA  7.9*   --   7.9*  8.2*  8.9     Recent Labs      12/17/17   0246  12/15/17   1517   SGOT  66*  87*   AP  76  83   TP  5.5*  6.9   ALB  1.8*  2.0*   GLOB  3.7  4.9*     No results for input(s): INR, PTP, APTT in the last 72 hours. No lab exists for component: INREXT, INREXT   No results for input(s): FE, TIBC, PSAT, FERR in the last 72 hours. No results found for: FOL, RBCF   No results for input(s): PH, PCO2, PO2 in the last 72 hours. No results for input(s): CPK, CKMB in the last 72 hours.     No lab exists for component: TROPONINI  No components found for: [place]  Lab Results   Component Value Date/Time    Color YELLOW/STRAW 06/28/2016 06:27 AM    Appearance CLEAR 06/28/2016 06:27 AM    Specific gravity 1.018 06/28/2016 06:27 AM    pH (UA) 7.0 06/28/2016 06:27 AM    Protein NEGATIVE  06/28/2016 06:27 AM    Glucose NEGATIVE  06/28/2016 06:27 AM    Ketone NEGATIVE  06/28/2016 06:27 AM    Bilirubin NEGATIVE  06/28/2016 06:27 AM    Urobilinogen 0.2 06/28/2016 06:27 AM    Nitrites NEGATIVE  06/28/2016 06:27 AM    Leukocyte Esterase NEGATIVE  06/28/2016 06:27 AM    Epithelial cells FEW 06/28/2016 06:27 AM Bacteria NEGATIVE  06/28/2016 06:27 AM    WBC 0-4 06/28/2016 06:27 AM    RBC 0-5 06/28/2016 06:27 AM       MEDICATIONS:  Current Facility-Administered Medications   Medication Dose Route Frequency    dextrose 5 % - 0.2% NaCl 1,000 mL with potassium chloride 20 mEq infusion   IntraVENous CONTINUOUS    lactulose enema in sterile water  1,000 mL Rectal BID    morphine injection 2 mg  2 mg IntraVENous Q4H PRN    pantoprazole (PROTONIX) tablet 20 mg  20 mg Oral DAILY    aspirin chewable tablet 81 mg  81 mg Oral QHS    pimavanserin tab 34 mg (Patient Supplied)  34 mg Oral DAILY    polyethylene glycol (MIRALAX) packet 17 g  17 g Oral QHS    latanoprost (XALATAN) 0.005 % ophthalmic solution 1 Drop  1 Drop Both Eyes QHS    sodium chloride (NS) flush 5-10 mL  5-10 mL IntraVENous Q8H    sodium chloride (NS) flush 5-10 mL  5-10 mL IntraVENous PRN    acetaminophen (TYLENOL) tablet 650 mg  650 mg Oral Q4H PRN    Or    acetaminophen (TYLENOL) solution 650 mg  650 mg Per NG tube Q4H PRN    Or    acetaminophen (TYLENOL) suppository 650 mg  650 mg Rectal Q4H PRN    rivastigmine (EXELON) 9.5 mg/24 hr patch 1 Patch  1 Patch TransDERmal DAILY

## 2017-12-18 NOTE — PROGRESS NOTES
Spiritual Care Partner Volunteer visited patient in [facility] on 12.18.17. [facility]  (Richard Encinas Patient Care Specialist)   Paging Service [address] [place] [address](1972)

## 2017-12-18 NOTE — PROGRESS NOTES
Problem: Mobility Impaired (Adult and Pediatric)  Goal: *Acute Goals and Plan of Care (Insert Text)  Physical Therapy Goals  Initiated 12/16/2017  1. Patient will move from supine to sit and sit to supine , scoot up and down and roll side to side in bed with moderate assistance  within 7 day(s). 2.  Patient will transfer from bed to chair and chair to bed with maximal assistance using the least restrictive device within 7 day(s). 3. Patient will demonstrate good static sitting balance in 7 days. 4.  Patient will participate in active ROM exercises for all joints within 7 days. physical Therapy TREATMENT  Patient: Governor Ponce (69 y.o. male)  Date: 12/18/2017  Diagnosis: Weakness  Weakness Weakness       Precautions:      ASSESSMENT:  Cleared for mobility progression by RN. Pt remained nonverbal throughout session and was unable to follow and simple one step commands. Participated in gentle PROM B LEs to reduce risk of increased contracture formation (appears to have B knee flexion contractures at baseline). Remains very rigid and tearful with minimal attempts at movement. Required total A for rolling and repositioning with pillows placed between legs and behind R flank for pressure reduction and skin protection. Will continue to follow and progress as able (apparently was ambulatory 3 weeks ago?). Recommending SNF vs home with 24/7 care and HHPT. Progression toward goals:  []    Improving appropriately and progressing toward goals  [x]    Improving slowly and progressing toward goals  []    Not making progress toward goals and plan of care will be adjusted     PLAN:  Patient continues to benefit from skilled intervention to address the above impairments. Continue treatment per established plan of care. Discharge Recommendations:  [facility] vs home with 24/7 care and HHPT  Further Equipment Recommendations for Discharge:  Hospital bed, pressure reduction mattress, sukumar lift. [place] SUBJECTIVE:   Non verbal. Facial grimacing and tearful with movement attempts    OBJECTIVE DATA SUMMARY:   Critical Behavior:  Neurologic State: Alert, Confused  Orientation Level: Unable to verbalize  Cognition: No command following, Decreased attention/concentration  Safety/Judgement: Not assessed  Functional Mobility Training:  Bed Mobility:  Rolling: Total assistance    Balance:    Pain:  Pain Scale 1: Adult Nonverbal Pain Scale  Pain Intensity 1: 0     Activity Tolerance:   NAD  Please refer to the flowsheet for vital signs taken during this treatment.   After treatment:   []    Patient left in no apparent distress sitting up in chair  [x]    Patient left in no apparent distress in bed  [x]    Call bell left within reach  [x]    Nursing notified  []    Caregiver present  []    Bed alarm activated    COMMUNICATION/COLLABORATION:   The patients plan of care was discussed with: Occupational Therapist and Registered Nurse    Joe Farah PT, DPT   Time Calculation: 11 mins

## 2017-12-18 NOTE — CONSULTS
PALLIATIVE MEDICINE      Consult noted and appreciated. Will see the pt tomorrow 12/19/17. Marlene Campbell.  [address] MSN, FNP-BC, [facility]

## 2017-12-18 NOTE — CDMP QUERY
There is documentation of the pt  having weight loss and failure to thrive can this be further specified as:     =>Severe protein saulo malnutrition in the setting of decreased intake, aspen score =18 requiring nutritional consult with recommendations  =>Other explanation of clinical findings  =>Unable to determine (no explanation for clinical findings)    The medical record reflects the following:     Clinical Indicators/Risk Factors: Per nutrition consult, pt meets criteria for acute severe protein calorie malnutrition as evidenced by acute illness, less than 50% intake, aspen illness score = 18. Treatment: Nutrition consult with recommendations, monitor VS, labs, and I/O. Please clarify and document your clinical opinion in the progress notes and discharge summary, including the definitive and or presumptive diagnosis, (suspected or probable), related to the above clinical findings. Please include clinical findings supporting your diagnosis.          Thank you,    Alejandra Medina RN, [address], [facility]  [phone]

## 2017-12-18 NOTE — PROGRESS NOTES
Cm reviewed chart and noted patient came to ER due to weakness and sacral wound. . Patient has medical hx of Parkinson's and CAD. He has been bed bound and declining  for the past few weeks. Palliative has been consulted. Patient is a full code and no AMD in chart. Cm attempted to meet  with patient in his room but he was sleeping and no family present  Chart indicates that he lives with his wife, Carroll Pabon [phone] in two level home with 4 steps to enter. Up until 3 weeks ago patient was completing adl's and able to ambulate with walker. He is serviced by [facility]. Chart indicates he has used [facility] in the past.    Wife is the main caregiver. He has RW,and  Wheelchair for [facility]. Patient now has NG tube as he has not been cleared by speech to swallow. CM left message for patient's wife, Carroll Pabno,  to call to discuss transition of care planning. Palliative has been consulted. It is not clear that patient can return home-- he will likely need hospital bed, hoya lift and other dme. CM to follow and assist with safe discharge plan. Care Management Interventions  PCP Verified by CM: Yes  Palliative Care Criteria Met (RRAT>21 & CHF Dx)?: Yes  Palliative Consult Recommended?: Yes  Mode of Transport at Discharge:  (TBD)  Transition of Care Consult (CM Consult): Discharge Planning  Discharge Durable Medical Equipment: No (if he returns home, he will need some dme)  Physical Therapy Consult: Yes  Occupational Therapy Consult: Yes  Speech Therapy Consult: Yes  Current Support Network: Lives with Spouse (lives at home with wife and was ambulating until 3 weeks ago. Bedbound the past couple of weeks.    No AMD in chart. )  Confirm Follow Up Transport:  (TBD)  Plan discussed with Pt/Family/Caregiver:  (will talk with wife)  Discharge Location  Discharge Placement:  (TBD)

## 2017-12-18 NOTE — PROGRESS NOTES
Hospitalist Progress Note  Mane Carrillo MD  Answering service: [phone] OR [phone] from in house phone      Date of Service:  2017  NAME:  Sandee Smith  :  1940  MRN:  [id number]      Admission Summary:   Patient admitted for weight loss and failure to thrive    Interval history / Subjective:     Patient seen ,he Is having a weak Voice, he is not in distress, He is npo. 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  Neurology consulted - continue asa  Speech eval    Unintentional Weight loss  CT chest showing possible lung nodule/mass - will consult pulmonary  Lung biopsy - Ct guided on monday    Sacral ulcer - unstageable on admission  Wound care consult    Hypernatremia  Could be related to reset osmostat in mineralocorticoid use  Hold Florinef and check urine NA  Dextrose IVF    Code status: Full  DVT prophylaxis: SCD    Care Plan discussed with: Patient/Family  Disposition: TBD    - d/w patients wife and updated on plan, She is concerned about his Parkinson medication, I have called pharmacy and verified its not here. RN will call ED to see if the medication is there, she also reports that he is not on sinemet anymore as he had intolerance to it. Hospital Problems  Date Reviewed: 12/15/2017          Codes Class Noted POA    * (Principal)Weakness ICD-10-CM: R53.1  ICD-9-CM: 780.79  12/15/2017 Yes                Review of Systems:   Review of systems not obtained due to patient factors. Vital Signs:    Last 24hrs VS reviewed since prior progress note.  Most recent are:  Visit Vitals    /53 (BP 1 Location: Right arm, BP Patient Position: At rest;Supine)    Pulse 70    Temp 99.2 °F (37.3 °C)    Resp 19    Ht 6' 1\" (1.854 m)    Wt 55.9 kg (123 lb 4.8 oz)    SpO2 100%    BMI 16.27 kg/m2         Intake/Output Summary (Last 24 hours) at 12/17/17 1905  Last data filed at 12/17/17 1158   Gross per 24 hour   Intake          2000.83 ml   Output             2300 ml   Net          -299.17 ml        Physical Examination:             Constitutional:  No acute distress, cooperative, pleasant   ENT:  Oral mucous Dry, oropharynx benign. Neck supple,    Resp:  CTA bilaterally. No wheezing/rhonchi/rales. No accessory muscle use   CV:  Regular rhythm, normal rate, no murmurs, gallops, rubs    GI:  Soft, non distended, non tender. normoactive bowel sounds, no hepatosplenomegaly     Musculoskeletal:  No edema, warm, 2+ pulses throughout    Neurologic:  Moves all extremities. AAOx3, CN II-XII reviewed     Psych:  Pleasant       Data Review:    Review and/or order of clinical lab test  Review and/or order of tests in the radiology section of CPT  Decision to obtain old records and/or obtain history from someone other than the patient      Labs:     Recent Labs      12/17/17   0246  12/15/17   1517   WBC  6.6  7.9   HGB  11.0*  12.9   HCT  34.4*  40.0   PLT  192  206     Recent Labs      12/17/17   1345  12/17/17   0246  12/16/17   0515  12/15/17   2334   NA  152*  154*  154*  158*  158*   K   --   3.6  3.4*  3.4*  3.5   CL   --   117*  118*  119*  121*   CO2   --   28  31  32  32   BUN   --   25*  25*  25*  27*   CREA   --   0.75  0.77  0.80  0.77   GLU   --   85  87  123*  132*   CA   --   7.9*  8.2*  8.9  8.8     Recent Labs      12/17/17   0246  12/15/17   1517   SGOT  66*  87*   ALT  109*  152*   AP  76  83   TBILI  1.1*  0.3   TP  5.5*  6.9   ALB  1.8*  2.0*   GLOB  3.7  4.9*     No results for input(s): INR, PTP, APTT in the last 72 hours. No lab exists for component: INREXT, INREXT   No results for input(s): FE, TIBC, PSAT, FERR in the last 72 hours. No results found for: FOL, RBCF   No results for input(s): PH, PCO2, PO2 in the last 72 hours.   Recent Labs      12/15/17   1517   TROIQ  <0.04     Lab Results   Component Value Date/Time Cholesterol, total 106 12/16/2017 05:15 AM    HDL Cholesterol 26 12/16/2017 05:15 AM    LDL, calculated 58.4 12/16/2017 05:15 AM    Triglyceride 108 12/16/2017 05:15 AM    CHOL/HDL Ratio 4.1 12/16/2017 05:15 AM     Lab Results   Component Value Date/Time    Glucose (POC) 91 11/27/2009 12:20 PM     Lab Results   Component Value Date/Time    Color YELLOW/STRAW 06/28/2016 06:27 AM    Appearance CLEAR 06/28/2016 06:27 AM    Specific gravity 1.018 06/28/2016 06:27 AM    pH (UA) 7.0 06/28/2016 06:27 AM    Protein NEGATIVE  06/28/2016 06:27 AM    Glucose NEGATIVE  06/28/2016 06:27 AM    Ketone NEGATIVE  06/28/2016 06:27 AM    Bilirubin NEGATIVE  06/28/2016 06:27 AM    Urobilinogen 0.2 06/28/2016 06:27 AM    Nitrites NEGATIVE  06/28/2016 06:27 AM    Leukocyte Esterase NEGATIVE  06/28/2016 06:27 AM    Epithelial cells FEW 06/28/2016 06:27 AM    Bacteria NEGATIVE  06/28/2016 06:27 AM    WBC 0-4 06/28/2016 06:27 AM    RBC 0-5 06/28/2016 06:27 AM         Medications Reviewed:     Current Facility-Administered Medications   Medication Dose Route Frequency    dextrose 5 % - 0.2% NaCl infusion  150 mL/hr IntraVENous CONTINUOUS    lactulose enema in sterile water  1,000 mL Rectal BID    morphine injection 2 mg  2 mg IntraVENous Q4H PRN    pantoprazole (PROTONIX) tablet 20 mg  20 mg Oral DAILY    aspirin chewable tablet 81 mg  81 mg Oral QHS    pimavanserin tab 34 mg (Patient Supplied)  34 mg Oral DAILY    polyethylene glycol (MIRALAX) packet 17 g  17 g Oral QHS    latanoprost (XALATAN) 0.005 % ophthalmic solution 1 Drop  1 Drop Both Eyes QHS    sodium chloride (NS) flush 5-10 mL  5-10 mL IntraVENous Q8H    sodium chloride (NS) flush 5-10 mL  5-10 mL IntraVENous PRN    acetaminophen (TYLENOL) tablet 650 mg  650 mg Oral Q4H PRN    Or    acetaminophen (TYLENOL) solution 650 mg  650 mg Per NG tube Q4H PRN    Or    acetaminophen (TYLENOL) suppository 650 mg  650 mg Rectal Q4H PRN    rivastigmine (EXELON) 9.5 mg/24 hr patch 1 Patch  1 Patch TransDERmal DAILY     ______________________________________________________________________  EXPECTED LENGTH OF STAY: - - -  ACTUAL LENGTH OF STAY:          2                 Jon Hughes MD

## 2017-12-18 NOTE — PROGRESS NOTES
Speech Pathology brief note:   Swallowing evaluation completed and full report to follow. Severe oropharyngeal dysphagia. Recommend continued NPO with discussions regarding goals of care. Will follow for re-assessment as appropriate. Thanks. Bekah Silverman M.CD.  CCC-SLP

## 2017-12-18 NOTE — WOUND CARE
WOCN Note:     New consult placed by RN for assessment of sacral ulcer. Chart reviewed. Admitted DX:  Weakness  Past Medical History:  hypercholesterolemia, coronary artery disease status post CABG, Parkinson disease, prostate cancer status post prostatectomy  Admitted from home. Assessment:   Patient is Alert, and requires assist of 2 with repositioning. Bed: total care  Patient wearing briefs for incontinence and has a Matias. Diet: NPO  All areas are present on admission. Bilateral heels and buttocks skin intact and without erythema. Left lateral malleolus 2.5 x 1.5 x 0 cm hyperpigmented area. Left medial knee 0.5 x 1 x 0 cm hypopigmented area. Blanching pink erythema to left trochanter. Sacral, Pressure Injury Unstageable Present on Admission:  7.5 x 5.2 x 0.4 cm 100% soft eschar with 10% pink noted around perimeter. Periwound hyperpigmented and non blanching; malodor noted; tender to touch; small serous exudate. Patient repositioned on right side with pillow between the knees. Heels offloaded on prevalon boots. Recommendations:    Prevalon boots  Upgrade bed  Turn team  Sacrum:  Every 3 days and as needed cleanse with saline; apply Duoderm; cover with transparent dressing to protect duoderm from incontinence. Skin Care & Pressure Prevention:  Minimize layers of linen/pads under patient to optimize support surface. Turn/reposition approximately every 2 hours and offload heels. Manage incontinence / promote continence. Specialty bed:  Sport or P500    Discussed above plan with Dr Courtney Salcedo and Ascencion Rizo RN.     Transition of Care: Plan to follow weekly and as needed while admitted to hospital.    FORTUNATO JacksonN RN  Office [phone]  Pager [phone]

## 2017-12-18 NOTE — PROGRESS NOTES
NUTRITION COMPLETE ASSESSMENT    RECOMMENDATIONS:   1. Once placement of NGT confirmed initiate tube feeds slowly:   - Jevity 1.5 @ 25ml/hr advanced 15ml/hr q24hr to goal of 55ml/hr + 150ml flush q4hr  2. Consult Palliative to discuss goals of care  3. At high risk for refeeding syndrome:   - check Phos, K+, Mg for 1st three days of nutrition support   - add 100mg thiamine/day x 10 days  4. Daily weights     Interventions/Plan:   Food/Nutrient Delivery:          Initiate enteral nutrition    Assessment:   Reason for Assessment: [x]BPA/MST Referral     Diet: NPO   Nutritionally Significant Medications: [x] Reviewed & Includes: ASA, lactulose enema, protonix, miralax, D5 0.2%NaCl w/ KCl @ 125ml/hr    Pre-Hospitalization:  Usual Appetite: Poor  Vitamins/Supplements: Yes (Ensure)     Subjective: Pt non-verbal.     Objective:  Pt admitted for weakness. PMHx: hypercholesterolemia, CAd, Parkinson's dx, CAD, prostatectomy. Hypernatremia continues. Renal noting water deficit of 3-4 liters, IVF infusing. Concerning lung mass seen on CT. Plans for FNA today. Unstagable sacral wound POA. WOCN following. D5 providing 150g CHO, 510kcal.     Failure to thrive with weight loss and poor appetite PTA. Eating mostly Ensure, peanut butter and orange juice per ED notes. Seen by speech today with severe oropharyngeal dysphagis - recommending NPO with discussion of goals of care. Agree with Palliative Consult to discuss goals of care. NGT placed this morning to administer medications however placement confirmation pending. Likely that tube feeds will be started per discussion with RN. Pt at high risk for refeeding syndrome: closely monitor electrolytes (K+, Phos, Mg), add thiamine (100mg/day x 10 days), and advance tube feeds slowly. If tube feeds started recommend: Jevity 1.5 @ 25ml/hr advanced 15ml/hr q24hr to goal of 55ml/hr + 150ml flush q4hr.  Provides: 1320ml, 1980kcal, 84g protein, 1003ml free fluid + 900ml flush = 1903ml fluid. Meets 100% energy and protein needs. Wt loss of 6% x 8 months. BMI 15.3. 63%IBW. Severe temporal, clavicular, and upper extremity wasting observed. Wt Readings from Last 10 Encounters:   12/18/17 52.8 kg (116 lb 4.8 oz)   04/01/17 55.8 kg (123 lb)   11/11/16 53.5 kg (118 lb)   11/07/16 53.5 kg (118 lb)   06/28/16 53.5 kg (118 lb)   07/12/15 53.5 kg (118 lb)   02/28/12 65.8 kg (145 lb)     Patient meets criteria for Severe Acute Protein Calorie Malnutrition as evidenced by:   ASPEN Malnutrition Criteria  Acute Illness, Chronic Illness, or Social/Enviornmental: Acute illness  Energy Intake: Less than/equal to 50% est energy req for greater than/equal to 5 days  Body Fat: Moderate  Muscle Mass: Moderate  ASPEN Malnutrition Score - Acute Illness: 18  Acute Illness - Malnutrition Diagnosis: Severe malnutrition. Will continue to follow for start of tube feeds, plan of care, wt trends, and electrolytes. Estimated Nutrition Needs:   Kcals/day: 9742 Kcals/day (1848--2006kcal)  Protein: 74 g (74-84g (1.4-1.6g/kg))  Fluid: 1900 ml (1ml/kcal)  Based On: Kcal/kg - specify (Comment) (35-38kcal/kg)  Weight Used: Actual wt (52.8kg)    Pt expected to meet estimated nutrient needs:  []   Yes     [x]  No (without nutrition support) [] Unable to predict at this time  Nutrition Diagnosis:   1. Malnutrition related to increased protein/energy needs as evidenced by BMI 17, severe cachexia, 63% IBW, sacral wound    2.  Inadequate oral intake related to dx progression, poor appetite; dysphagia as evidenced by minimal PO intake for 3 weeks PTA, plans for NGT     Goals:     EN to meet at least 90% needs in 2-3 days (if consistent with goals of care)     Monitoring & Evaluation:    - Total energy intake, Enteral/parenteral nutrition intake   - Weight/weight change, Electrolyte and renal profile    Previous Nutrition Goals Met:   N/A  Previous Recommendations:    N/A    Education & Discharge Needs:   [x] None Identified   [] Identified and addressed    [] Participated in care plan, discharge planning, and/or interdisciplinary rounds        Cultural, Episcopal and ethnic food preferences identified: None    Skin Integrity: []Intact  [x]Other: sacral wound - unstageable  Edema: [x]None []Other  Last BM: 12/18  Food Allergies: [x]None []Other  Diet Restrictions: Cultural/Buddhist Preference(s): None     Anthropometrics:    Weight Loss Metrics 12/18/2017 4/1/2017 11/11/2016 11/7/2016 6/28/2016 7/12/2015 2/28/2012   Today's Wt 116 lb 4.8 oz 123 lb 118 lb 118 lb 118 lb 118 lb 145 lb   BMI 15.34 kg/m2 16.23 kg/m2 15.57 kg/m2 15.57 kg/m2 15.57 kg/m2 15.57 kg/m2 19.13 kg/m2      Weight Source: Bed  Height: 6' 1\" (185.4 cm),    Body mass index is 15.34 kg/(m^2).   IBW : 83.5 kg (184 lb), % IBW (Calculated): 63.21 %  Usual Body Weight: 55.8 kg (123 lb),      Labs:    Lab Results   Component Value Date/Time    Sodium 147 12/18/2017 04:45 AM    Potassium 3.2 12/18/2017 04:45 AM    Chloride 112 12/18/2017 04:45 AM    CO2 28 12/18/2017 04:45 AM    Glucose 135 12/18/2017 04:45 AM    BUN 17 12/18/2017 04:45 AM    Creatinine 0.69 12/18/2017 04:45 AM    Calcium 7.9 12/18/2017 04:45 AM    Albumin 1.8 12/17/2017 02:46 AM     Lab Results   Component Value Date/Time    Hemoglobin A1c 5.6 12/16/2017 05:24 AM     Candi Lee, RD

## 2017-12-18 NOTE — INTERDISCIPLINARY ROUNDS
IDR/SLIDR Summary          Patient: Governor Ponce MRN: [id number]    Age: 68 y.o. YOB: 1940 Room/Bed: [facility]   Admit Diagnosis: Weakness  Weakness  Principal Diagnosis: Weakness   Goals: decrease sodium/ammonia levels, PT/OT, d/c planning  Readmission: NO  Quality Measure: Not applicable  VTE Prophylaxis: Mechanical  Influenza Vaccine screening completed? YES  Pneumococcal Vaccine screening completed? NO  Mobility needs: Yes   Nutrition plan:Yes  Consults:P.T, O.T., Speech, Respiratory and Case Management    Financial concerns:No  Escalated to CM? YES  RRAT Score: 10   Interventions:H2H  Testing due for pt today?  YES  LOS: 3 days Expected length of stay 5 days  Discharge plan: home w/ home health   PCP: Isiah López MD  Transportation needs: Yes    Days before discharge:two or more days before discharge   Discharge disposition: Home    Signed:     Denita Brandt  12/18/2017  12:43 AM

## 2017-12-19 ENCOUNTER — APPOINTMENT (OUTPATIENT)
Dept: GENERAL RADIOLOGY | Age: 77
DRG: 981 | End: 2017-12-19
Attending: HOSPITALIST
Payer: MEDICARE

## 2017-12-19 LAB
AMMONIA PLAS-SCNC: 20 UMOL/L
ANION GAP SERPL CALC-SCNC: 4 MMOL/L (ref 5–15)
BASOPHILS # BLD: 0 K/UL (ref 0–0.1)
BASOPHILS NFR BLD: 0 % (ref 0–1)
BUN SERPL-MCNC: 11 MG/DL (ref 6–20)
BUN/CREAT SERPL: 13 (ref 12–20)
CALCIUM SERPL-MCNC: 7.7 MG/DL (ref 8.5–10.1)
CHLORIDE SERPL-SCNC: 113 MMOL/L (ref 97–108)
CO2 SERPL-SCNC: 30 MMOL/L (ref 21–32)
CREAT SERPL-MCNC: 0.84 MG/DL (ref 0.7–1.3)
EOSINOPHIL # BLD: 0.1 K/UL (ref 0–0.4)
EOSINOPHIL NFR BLD: 1 % (ref 0–7)
ERYTHROCYTE [DISTWIDTH] IN BLOOD BY AUTOMATED COUNT: 14.1 % (ref 11.5–14.5)
GLUCOSE SERPL-MCNC: 140 MG/DL (ref 65–100)
HCT VFR BLD AUTO: 31.1 % (ref 36.6–50.3)
HGB BLD-MCNC: 10.2 G/DL (ref 12.1–17)
LYMPHOCYTES # BLD: 0.5 K/UL (ref 0.8–3.5)
LYMPHOCYTES NFR BLD: 7 % (ref 12–49)
MAGNESIUM SERPL-MCNC: 2.2 MG/DL (ref 1.6–2.4)
MCH RBC QN AUTO: 30.7 PG (ref 26–34)
MCHC RBC AUTO-ENTMCNC: 32.8 G/DL (ref 30–36.5)
MCV RBC AUTO: 93.7 FL (ref 80–99)
MONOCYTES # BLD: 0.3 K/UL (ref 0–1)
MONOCYTES NFR BLD: 4 % (ref 5–13)
NEUTS SEG # BLD: 6.5 K/UL (ref 1.8–8)
NEUTS SEG NFR BLD: 88 % (ref 32–75)
PHOSPHATE SERPL-MCNC: 2.5 MG/DL (ref 2.6–4.7)
PLATELET # BLD AUTO: 204 K/UL (ref 150–400)
POTASSIUM SERPL-SCNC: 3.9 MMOL/L (ref 3.5–5.1)
RBC # BLD AUTO: 3.32 M/UL (ref 4.1–5.7)
SODIUM SERPL-SCNC: 147 MMOL/L (ref 136–145)
WBC # BLD AUTO: 7.5 K/UL (ref 4.1–11.1)

## 2017-12-19 PROCEDURE — 74011250637 HC RX REV CODE- 250/637: Performed by: HOSPITALIST

## 2017-12-19 PROCEDURE — 84100 ASSAY OF PHOSPHORUS: CPT | Performed by: HOSPITALIST

## 2017-12-19 PROCEDURE — 83735 ASSAY OF MAGNESIUM: CPT | Performed by: HOSPITALIST

## 2017-12-19 PROCEDURE — 0DH67UZ INSERTION OF FEEDING DEVICE INTO STOMACH, VIA NATURAL OR ARTIFICIAL OPENING: ICD-10-PCS | Performed by: HOSPITALIST

## 2017-12-19 PROCEDURE — 92526 ORAL FUNCTION THERAPY: CPT | Performed by: SPEECH-LANGUAGE PATHOLOGIST

## 2017-12-19 PROCEDURE — 85025 COMPLETE CBC W/AUTO DIFF WBC: CPT | Performed by: HOSPITALIST

## 2017-12-19 PROCEDURE — 80048 BASIC METABOLIC PNL TOTAL CA: CPT | Performed by: HOSPITALIST

## 2017-12-19 PROCEDURE — 43752 NASAL/OROGASTRIC W/TUBE PLMT: CPT

## 2017-12-19 PROCEDURE — 74011636320 HC RX REV CODE- 636/320: Performed by: HOSPITALIST

## 2017-12-19 PROCEDURE — 36415 COLL VENOUS BLD VENIPUNCTURE: CPT | Performed by: HOSPITALIST

## 2017-12-19 PROCEDURE — 74011250636 HC RX REV CODE- 250/636: Performed by: INTERNAL MEDICINE

## 2017-12-19 PROCEDURE — 74011250637 HC RX REV CODE- 250/637: Performed by: FAMILY MEDICINE

## 2017-12-19 PROCEDURE — 74011250636 HC RX REV CODE- 250/636: Performed by: HOSPITALIST

## 2017-12-19 PROCEDURE — 65660000000 HC RM CCU STEPDOWN

## 2017-12-19 PROCEDURE — 82140 ASSAY OF AMMONIA: CPT | Performed by: HOSPITALIST

## 2017-12-19 PROCEDURE — 74011000250 HC RX REV CODE- 250

## 2017-12-19 RX ORDER — DEXTROSE AND POTASSIUM CHLORIDE 5; .15 G/100ML; G/100ML
SOLUTION INTRAVENOUS CONTINUOUS
Status: DISCONTINUED | OUTPATIENT
Start: 2017-12-19 | End: 2017-12-20

## 2017-12-19 RX ORDER — LIDOCAINE HYDROCHLORIDE 20 MG/ML
JELLY TOPICAL
Status: COMPLETED
Start: 2017-12-19 | End: 2017-12-19

## 2017-12-19 RX ORDER — LIDOCAINE HYDROCHLORIDE 20 MG/ML
JELLY TOPICAL AS NEEDED
Status: DISCONTINUED | OUTPATIENT
Start: 2017-12-19 | End: 2018-01-04 | Stop reason: HOSPADM

## 2017-12-19 RX ORDER — LANOLIN ALCOHOL/MO/W.PET/CERES
100 CREAM (GRAM) TOPICAL DAILY
Status: DISPENSED | OUTPATIENT
Start: 2017-12-20 | End: 2017-12-30

## 2017-12-19 RX ADMIN — POTASSIUM CHLORIDE, DEXTROSE MONOHYDRATE AND SODIUM CHLORIDE 125 ML/HR: 150; 5; 200 INJECTION, SOLUTION INTRAVENOUS at 05:39

## 2017-12-19 RX ADMIN — Medication 10 ML: at 13:13

## 2017-12-19 RX ADMIN — DIATRIZOATE MEGLUMINE AND DIATRIZOATE SODIUM 30 ML: 660; 100 LIQUID ORAL; RECTAL at 16:00

## 2017-12-19 RX ADMIN — Medication 10 ML: at 21:27

## 2017-12-19 RX ADMIN — MORPHINE SULFATE 2 MG: 2 INJECTION, SOLUTION INTRAMUSCULAR; INTRAVENOUS at 05:43

## 2017-12-19 RX ADMIN — MORPHINE SULFATE 2 MG: 2 INJECTION, SOLUTION INTRAMUSCULAR; INTRAVENOUS at 20:42

## 2017-12-19 RX ADMIN — ASPIRIN 81 MG 81 MG: 81 TABLET ORAL at 21:26

## 2017-12-19 RX ADMIN — DEXTROSE MONOHYDRATE: 5 INJECTION, SOLUTION INTRAVENOUS at 13:12

## 2017-12-19 RX ADMIN — Medication 10 ML: at 05:39

## 2017-12-19 RX ADMIN — MORPHINE SULFATE 2 MG: 2 INJECTION, SOLUTION INTRAMUSCULAR; INTRAVENOUS at 14:59

## 2017-12-19 RX ADMIN — LACTULOSE 1000 ML: 10 SOLUTION ORAL at 10:18

## 2017-12-19 RX ADMIN — LIDOCAINE HYDROCHLORIDE: 20 JELLY TOPICAL at 16:00

## 2017-12-19 RX ADMIN — LATANOPROST 1 DROP: 50 SOLUTION OPHTHALMIC at 21:27

## 2017-12-19 RX ADMIN — LACTULOSE 20 G: 20 SOLUTION ORAL at 18:02

## 2017-12-19 RX ADMIN — POTASSIUM CHLORIDE AND DEXTROSE MONOHYDRATE: 150; 5 INJECTION, SOLUTION INTRAVENOUS at 23:06

## 2017-12-19 NOTE — ROUTINE PROCESS
Bedside and Verbal shift change report given to Ziggy Stephens RN (oncoming nurse) by Yaneth Christensen (offgoing nurse). Report included the following information SBAR, Kardex, Procedure Summary, Intake/Output, MAR, Accordion, Recent Results, Med Rec Status and Cardiac Rhythm NSR/Randy.

## 2017-12-19 NOTE — PROGRESS NOTES
Hospitalist Progress Note  Mello Willams MD  Answering service: [phone] OR [phone] from in house phone      Date of Service:  2017  NAME:  Cristina Howell  :  1940  MRN:  [id number]      Admission Summary:   Patient admitted for weight loss and failure to thrive    Interval history / Subjective:       Patient seen ,he Is having a weak Voice, he is not in distress, He is npo. NG tube has been placed by IR. 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 Neurology consulted - continue asa  Speech eval - Recommends NPO for now  Will place NG tube for medications  Dietary consult - started on tube feeding ()    Unintentional Weight loss  CT chest showing possible lung nodule/mass - consulted pulmonary  Lung biopsy - Ct guided on , however Evaluated by radiology and doubt this Is a true lung tumor and recommends follow up ct in 6 months  Pulmonary following and defer investigations    Failure to thrive and dysphagia with Severe protein calorie nutrition  Orderd NG for feeding and medication admistration   I have also placed palliative consult to discuss with family regarding goals of care if family wishes to proceed with peg tube. Sacral ulcer - unstageable on admission  Wound care consult    Metabolic encephalopathy from Hypernatremia on admission   Stable    Hypernatremia  improving  Could be related to reset osmostat in mineralocorticoid use  Hold Florinef  Dextrose IVF  Renal Following    Code status: Full  DVT prophylaxis: SCD    Care Plan discussed with: Patient/Family  Disposition: TBD    - d/w patients wife and updated on plan, She is concerned about his Parkinson medication, I have called pharmacy and verified its not here.  RN will call ED to see if the medication is there, she also reports that he is not on sinemet anymore as he had intolerance to it.   12/17 - updated wife at bedside on plan of care  12/18 - NG placed by nursing however placement was not confirmed, removed  12/19 - Ng placed by IR - Feeding orders given      Hospital Problems  Date Reviewed: 12/15/2017          Codes Class Noted POA    * (Principal)Weakness ICD-10-CM: R53.1  ICD-9-CM: 780.79  12/15/2017 Yes                Review of Systems:   Review of systems not obtained due to patient factors. Vital Signs:    Last 24hrs VS reviewed since prior progress note. Most recent are:  Visit Vitals    BP (!) 112/92 (BP 1 Location: Right arm, BP Patient Position: At rest)    Pulse 68    Temp 97.5 °F (36.4 °C)    Resp 16    Ht 6' 1\" (1.854 m)    Wt 52.4 kg (115 lb 8.3 oz)    SpO2 100%    BMI 15.24 kg/m2         Intake/Output Summary (Last 24 hours) at 12/19/17 1216  Last data filed at 12/19/17 0800   Gross per 24 hour   Intake                0 ml   Output             2130 ml   Net            -2130 ml        Physical Examination:             Constitutional:  No acute distress, cooperative, pleasant   ENT:  Oral mucous Dry, oropharynx benign. Neck supple,    Resp:  CTA bilaterally. No wheezing/rhonchi/rales. No accessory muscle use   CV:  Regular rhythm, normal rate, no murmurs, gallops, rubs    GI:  Soft, non distended, non tender. normoactive bowel sounds, no hepatosplenomegaly     Musculoskeletal:  No edema, warm, 2+ pulses throughout    Neurologic:  Moves all extremities. , CN II-XII reviewed, Increased Rigidity     Psych:  Pleasant       Data Review:    Review and/or order of clinical lab test  Review and/or order of tests in the radiology section of CPT  Decision to obtain old records and/or obtain history from someone other than the patient      Labs:     Recent Labs      12/19/17   0251  12/17/17   0246   WBC  7.5  6.6   HGB  10.2*  11.0*   HCT  31.1*  34.4*   PLT  204  192     Recent Labs      12/19/17 0251 12/18/17   0445  12/17/17   1345  12/17/17   0246   NA  147*  147*  152*  154*  154*   K  3.9  3.2*   --   3.6  3.4*   CL  113*  112*   --   117*  118*   CO2  30  28   --   28  31   BUN  11  17   --   25*  25*   CREA  0.84  0.69*   --   0.75  0.77   GLU  140*  135*   --   85  87   CA  7.7*  7.9*   --   7.9*  8.2*   MG  2.2   --    --    --    PHOS  2.5*   --    --    --      Recent Labs      12/17/17   0246   SGOT  66*   ALT  109*   AP  76   TBILI  1.1*   TP  5.5*   ALB  1.8*   GLOB  3.7     No results for input(s): INR, PTP, APTT in the last 72 hours. No lab exists for component: INREXT, INREXT   No results for input(s): FE, TIBC, PSAT, FERR in the last 72 hours. No results found for: FOL, RBCF   No results for input(s): PH, PCO2, PO2 in the last 72 hours. No results for input(s): CPK, CKNDX, TROIQ in the last 72 hours.     No lab exists for component: CPKMB  Lab Results   Component Value Date/Time    Cholesterol, total 106 12/16/2017 05:15 AM    HDL Cholesterol 26 12/16/2017 05:15 AM    LDL, calculated 58.4 12/16/2017 05:15 AM    Triglyceride 108 12/16/2017 05:15 AM    CHOL/HDL Ratio 4.1 12/16/2017 05:15 AM     Lab Results   Component Value Date/Time    Glucose (POC) 91 11/27/2009 12:20 PM     Lab Results   Component Value Date/Time    Color YELLOW/STRAW 06/28/2016 06:27 AM    Appearance CLEAR 06/28/2016 06:27 AM    Specific gravity 1.018 06/28/2016 06:27 AM    pH (UA) 7.0 06/28/2016 06:27 AM    Protein NEGATIVE  06/28/2016 06:27 AM    Glucose NEGATIVE  06/28/2016 06:27 AM    Ketone NEGATIVE  06/28/2016 06:27 AM    Bilirubin NEGATIVE  06/28/2016 06:27 AM    Urobilinogen 0.2 06/28/2016 06:27 AM    Nitrites NEGATIVE  06/28/2016 06:27 AM    Leukocyte Esterase NEGATIVE  06/28/2016 06:27 AM    Epithelial cells FEW 06/28/2016 06:27 AM    Bacteria NEGATIVE  06/28/2016 06:27 AM    WBC 0-4 06/28/2016 06:27 AM    RBC 0-5 06/28/2016 06:27 AM     All Micro Results     Procedure Component Value Units Date/Time URINE CULTURE HOLD SAMPLE [[id number]] Collected:  12/15/17 1826    Order Status:  Canceled Specimen:  Urine         Xr Chest Sngl V    Result Date: 12/15/2017  IMPRESSION: No acute process. Mri Brain Wo Cont    Result Date: 12/16/2017  IMPRESSION: 1. Old left mo lacunar infarct. 2. Age-related findings within the upper limits of normal. 3. No acute intracranial abnormality demonstrated. Ct Head Wo Cont    Result Date: 12/15/2017  IMPRESSION: 1. There is no acute intracranial hemorrhage. 2. Apparent area of low attenuation in the left aspect of the spines may represent an age-indeterminate infarct. Further evaluation with MRI is suggested if clinically warranted. The findings were discussed with Dr. Rosy Gomez on 12/15/2017 at 1600 hours by Dr. Sheila Bill. [phone]     Ct Chest Wo Cont    Result Date: 12/16/2017  IMPRESSION: Findings concerning for pulmonary neoplasm in this clinical setting, left lower lobe masslike lesion 16 x 27 mm in size. PET CT prior to sampling as this may represent rounded atelectasis/chronic parenchymal change. Lesion is amenable to percutaneous sampling if clinically warranted. No acute intraperitoneal process is identified. Renal hypodensities are consistent with simple cyst.    Ct Abd Pelv Wo Cont    Result Date: 12/16/2017  IMPRESSION: Findings concerning for pulmonary neoplasm in this clinical setting, left lower lobe masslike lesion 16 x 27 mm in size. PET CT prior to sampling as this may represent rounded atelectasis/chronic parenchymal change. Lesion is amenable to percutaneous sampling if clinically warranted. No acute intraperitoneal process is identified. Renal hypodensities are consistent with simple cyst.    Xr Abd Port  1 V    Result Date: 12/18/2017  IMPRESSION: Nasogastric tube in the right lower lobe bronchus. This should be removed. Xr Abd Port  1 V    Result Date: 12/18/2017  IMPRESSION: No nasogastric tube identified.  INTERPRETATION PROVIDED FOR COMPLIANCE ONLY AT NO CHARGE       Medications Reviewed:     Current Facility-Administered Medications   Medication Dose Route Frequency    dextrose 5% 1,000 mL with potassium chloride 20 mEq infusion   IntraVENous CONTINUOUS    lactulose enema in sterile water  1,000 mL Rectal BID    morphine injection 2 mg  2 mg IntraVENous Q4H PRN    pantoprazole (PROTONIX) tablet 20 mg  20 mg Oral DAILY    aspirin chewable tablet 81 mg  81 mg Oral QHS    pimavanserin tab 34 mg (Patient Supplied)  34 mg Oral DAILY    polyethylene glycol (MIRALAX) packet 17 g  17 g Oral QHS    latanoprost (XALATAN) 0.005 % ophthalmic solution 1 Drop  1 Drop Both Eyes QHS    sodium chloride (NS) flush 5-10 mL  5-10 mL IntraVENous Q8H    sodium chloride (NS) flush 5-10 mL  5-10 mL IntraVENous PRN    acetaminophen (TYLENOL) tablet 650 mg  650 mg Oral Q4H PRN    Or    acetaminophen (TYLENOL) solution 650 mg  650 mg Per NG tube Q4H PRN    Or    acetaminophen (TYLENOL) suppository 650 mg  650 mg Rectal Q4H PRN    rivastigmine (EXELON) 9.5 mg/24 hr patch 1 Patch  1 Patch TransDERmal DAILY     ______________________________________________________________________  EXPECTED LENGTH OF STAY: 2d 14h  ACTUAL LENGTH OF STAY:          4                 Marlin Kay MD

## 2017-12-19 NOTE — PROGRESS NOTES
Problem: Falls - Risk of  Goal: *Absence of Falls  Document Ora Fall Risk and appropriate interventions in the flowsheet.    Outcome: Progressing Towards Goal  Fall Risk Interventions:  Mobility Interventions: Assess mobility with egress test, Communicate number of staff needed for ambulation/transfer, PT Consult for mobility concerns, PT Consult for assist device competence    Mentation Interventions: Adequate sleep, hydration, pain control, Door open when patient unattended, More frequent rounding    Medication Interventions: Bed/chair exit alarm, Evaluate medications/consider consulting pharmacy    Elimination Interventions: Call light in reach, Toileting schedule/hourly rounds

## 2017-12-19 NOTE — PROGRESS NOTES
Problem: Falls - Risk of  Goal: *Absence of Falls  Document Ora Fall Risk and appropriate interventions in the flowsheet. Outcome: Progressing Towards Goal  Fall Risk Interventions:  Mobility Interventions: Bed/chair exit alarm, OT consult for ADLs, Patient to call before getting OOB    Mentation Interventions: Bed/chair exit alarm, More frequent rounding, Reorient patient, Update white board    Medication Interventions: Patient to call before getting OOB, Bed/chair exit alarm    Elimination Interventions: Call light in reach, Toileting schedule/hourly rounds, Patient to call for help with toileting needs             Comments: Problem: Falls - Risk of  Goal: *Absence of Falls  Document Ora Fall Risk and appropriate interventions in the flowsheet.    Outcome: Progressing Towards Goal  Fall Risk Interventions:  Mobility Interventions: Bed/chair exit alarm, OT consult for ADLs, Patient to call before getting OOB     Mentation Interventions: Bed/chair exit alarm, More frequent rounding, Reorient patient, Update white board     Medication Interventions: Patient to call before getting OOB, Bed/chair exit alarm     Elimination Interventions: Call light in reach, Toileting schedule/hourly rounds, Patient to call for help with toileting needs

## 2017-12-19 NOTE — PROGRESS NOTES
Bedside shift change report given to BALJIT Watson (oncoming nurse) by Maggie Escoto (offgoing nurse). Report included the following information SBAR, Kardex, Procedure Summary, Intake/Output, MAR, Recent Results and Cardiac Rhythm NSR.

## 2017-12-19 NOTE — INTERDISCIPLINARY ROUNDS
IDR/SLIDR Summary          Patient: Sandee Smith MRN: [id number]    Age: 68 y.o. YOB: 1940 Room/Bed: [facility]   Admit Diagnosis: Weakness  Weakness  Principal Diagnosis: Weakness   Goals: NG Tube placement, begin tube feeding, Palliative consult  Readmission: NO  Quality Measure: Not applicable  VTE Prophylaxis: Mechanical  Influenza Vaccine screening completed? YES  Pneumococcal Vaccine screening completed? NO  Mobility needs: Yes   Nutrition plan:Yes  Consults:P.T, O.T., Speech, Respiratory and Case Management    Financial concerns:No  Escalated to CM? YES  RRAT Score: 10   Interventions:H2H  Testing due for pt today?  YES  LOS: 4 days Expected length of stay 5 days  Discharge plan: home w/ home health   PCP: Jocelin Valente MD  Transportation needs: Yes    Days before discharge:two or more days before discharge   Discharge disposition: Home    Signed:     Fredrick Martínez  12/19/17  0900 am

## 2017-12-19 NOTE — ROUTINE PROCESS
Placed call to ct spoke with BALJIT Valverde procedure will not be done per Dr. Reyna Snider Radiologist suggested follow up CT stated rounded atelectasias. Contacted pulmonary PA and advised.

## 2017-12-19 NOTE — PROGRESS NOTES
Pulmonary, Critical Care, and Sleep Medicine~Progress Note    Name: Kathie Wright MRN: [id number]   : 1940 Hospital: [address]   Date: 2017 9:14 AM Admission: 12/15/2017     IMPRESSION:   · 1.6cm LLL medial/peripherial nodule; worrisome for neoplasia   · Generalized weakness that seems to have been progressive for several weeks   · Hypernatremia, improving  · Parkinson's disease   · Malnutrition   · CAD         PLAN:   · CT-FNA yesterday was cancelled by radiologist; it was felt that the lesion in question was rounded atx and they recommended follow up CT scan    · Protonix/ SCDs  · O2 titration above 90%  · palliative care involved      Daily Progression:    Does not appear in distress     I have reviewed the labs and previous days notes.     Unable to complete ROS secondary to patient factors     OBJECTIVE:     Vital Signs:       Visit Vitals    BP (!) 112/92 (BP 1 Location: Right arm, BP Patient Position: At rest)    Pulse 68    Temp 97.5 °F (36.4 °C)    Resp 16    Ht 6' 1\" (1.854 m)    Wt 52.4 kg (115 lb 8.3 oz)    SpO2 100%    BMI 15.24 kg/m2      Temp (24hrs), Av.9 °F (37.2 °C), Min:97.5 °F (36.4 °C), Max:101.8 °F (38.8 °C)     Intake/Output:     Last shift:  07 - 1900  In: -   Out: 5 [Urine:5]    Last 3 shifts: 1901 -  07  In: -   Out: 1251 [Urine:2580]          Intake/Output Summary (Last 24 hours) at 17 1206  Last data filed at 17 0800   Gross per 24 hour   Intake                0 ml   Output             2130 ml   Net            -2130 ml       Physical Exam:                                        Exam Findings Other   General: No resp distress noted, appears stated age    HEENT:  No ulcers, JVD not elevated, no cervical LAD    Chest: No pectus deformity, normal chest rise b/l    HEART:  RRR, no murmurs/rubs/gallops    Lungs:  CTA b/l, no rhonchi/crackles/wheeze, diminished BS at bases    ABD: Soft/NT, non rigid mildly distended    EXT: No cyanosis/clubbing/edema, normal peripheral pulses    Skin: No rashes or ulcers, no mottling    Neuro: Alert. No verbal         Medications:  Current Facility-Administered Medications   Medication Dose Route Frequency    dextrose 5% 1,000 mL with potassium chloride 20 mEq infusion   IntraVENous CONTINUOUS    lactulose enema in sterile water  1,000 mL Rectal BID    morphine injection 2 mg  2 mg IntraVENous Q4H PRN    pantoprazole (PROTONIX) tablet 20 mg  20 mg Oral DAILY    aspirin chewable tablet 81 mg  81 mg Oral QHS    pimavanserin tab 34 mg (Patient Supplied)  34 mg Oral DAILY    polyethylene glycol (MIRALAX) packet 17 g  17 g Oral QHS    latanoprost (XALATAN) 0.005 % ophthalmic solution 1 Drop  1 Drop Both Eyes QHS    sodium chloride (NS) flush 5-10 mL  5-10 mL IntraVENous Q8H    sodium chloride (NS) flush 5-10 mL  5-10 mL IntraVENous PRN    acetaminophen (TYLENOL) tablet 650 mg  650 mg Oral Q4H PRN    Or    acetaminophen (TYLENOL) solution 650 mg  650 mg Per NG tube Q4H PRN    Or    acetaminophen (TYLENOL) suppository 650 mg  650 mg Rectal Q4H PRN    rivastigmine (EXELON) 9.5 mg/24 hr patch 1 Patch  1 Patch TransDERmal DAILY       Labs:  ABG No results for input(s): PHI, PCO2I, PO2I, HCO3I, SO2I, FIO2I in the last 72 hours.      CBC Recent Labs      12/19/17   0251  12/17/17   0246   WBC  7.5  6.6   HGB  10.2*  11.0*   HCT  31.1*  34.4*   PLT  204  192   MCV  93.7  95.6   MCH  30.7  19.4        Metabolic  Panel Recent Labs      12/19/17   0251  12/18/17   0445  12/17/17   1345  12/17/17   0246   NA  147*  147*  152*  154*  154*   K  3.9  3.2*   --   3.6  3.4*   CL  113*  112*   --   117*  118*   CO2  30  28   --   28  31   GLU  140*  135*   --   85  87   BUN  11  17   --   25*  25*   CREA  0.84  0.69*   --   0.75  0.77   CA  7.7*  7.9*   --   7.9*  8.2*   MG  2.2   --    --    --    PHOS  2.5*   --    --    -- ALB   --    --    --   1.8*   SGOT   --    --    --   66*   ALT   --    --    --   109*        Pertinent Labs                JOSE Shultz  12/19/2017

## 2017-12-19 NOTE — PROGRESS NOTES
Problem: Dysphagia (Adult)  Goal: *Acute Goals and Plan of Care (Insert Text)  Speech therapy goals  Initiated 12/18/2017   1. Patient will participate in re-assessment of swallow function within 7 days    Speech language pathology dysphagia treatment  Patient: Timmy Prasad (33 y.o. male)  Date: 12/19/2017  Diagnosis: Weakness  Weakness Weakness       Precautions: aspiration      ASSESSMENT:  Patient with slight improvement in function in that he was able to extract a piece of ice from a spoon with increased time and multiple unsuccessful attempts due to oral motor weakness. Some active manipulation today, though remains minimal with delayed and uncoordinated posterior propulsion. Delayed swallow initiation, weak hyolaryngeal elevation/excursion and multiple audible swallows per bolus suggestive of pharyngeal residue. Weak, wet cough after single ice chip trial concerning for aspiration. Mental status further impacts swallow safety. Remains unsafe for PO intake at this time. Progression toward goals:  []         Improving appropriately and progressing toward goals  []         Improving slowly and progressing toward goals  []         Not making progress toward goals and plan of care will be adjusted     PLAN:  Recommendations and Planned Interventions:  --continued NPO. Note plans for NG placement today as well as discussion with Palliative care. --will follow for re-assessment of swallow function as appropriate   Patient continues to benefit from skilled intervention to address the above impairments. Continue treatment per established plan of care. Discharge Recommendations:  Skilled Nursing Facility     SUBJECTIVE:   Patient alert, no command following.   .    OBJECTIVE:   Cognitive and Communication Status:  Neurologic State: Alert, Confused  Orientation Level: Unable to verbalize  Cognition: No command following, Decreased attention/concentration        Safety/Judgement: Not assessed  Dysphagia Treatment:  Oral Assessment:  Oral Assessment  Labial:  (did not follow commands to assess)  P.O. Trials:  Patient Position: upright in bed   Vocal quality prior to P.O.:    Consistency Presented: Ice chips  How Presented: SLP-fed/presented;Spoon     Bolus Acceptance: Impaired (with increased time did accept actively today )  Bolus Formation/Control: Impaired  Type of Impairment: Other (comment) (minimal manipulation though some active today)  Propulsion: Delayed (# of seconds); Discoordination  Oral Residue: None  Initiation of Swallow: Delayed (# of seconds)  Laryngeal Elevation: Decreased;Weak  Aspiration Signs/Symptoms: Change vocal quality;Weak cough  Pharyngeal Phase Characteristics: Audible swallow;Multiple swallows; Suspected pharyngeal residue (weak hyolaryngeal elevation/excursion )  Effective Modifications: None          Oral Phase Severity: Moderate-severe  Pharyngeal Phase Severity : Moderate-severe                                                                                                              Pain:  Pain Scale 1: Adult Nonverbal Pain Scale  Pain Intensity 1: 0     After treatment:   []              Patient left in no apparent distress sitting up in chair  [x]              Patient left in no apparent distress in bed  [x]              Call bell left within reach  [x]              Nursing notified  []              Caregiver present  []              Bed alarm activated    COMMUNICATION/EDUCATION:       The patients plan of care including recommendations, planned interventions, and recommended diet changes were discussed with: Registered Nurse. Perlita Kelly M.CD.  CCC-SLP   Time Calculation: 8 mins

## 2017-12-20 LAB
ALBUMIN SERPL-MCNC: 1.6 G/DL (ref 3.5–5)
ALBUMIN/GLOB SERPL: 0.4 {RATIO} (ref 1.1–2.2)
ALP SERPL-CCNC: 82 U/L (ref 45–117)
ALT SERPL-CCNC: 54 U/L (ref 12–78)
ANION GAP SERPL CALC-SCNC: 8 MMOL/L (ref 5–15)
AST SERPL-CCNC: 57 U/L (ref 15–37)
BASOPHILS # BLD: 0 K/UL (ref 0–0.1)
BASOPHILS NFR BLD: 0 % (ref 0–1)
BILIRUB SERPL-MCNC: 0.7 MG/DL (ref 0.2–1)
BUN SERPL-MCNC: 9 MG/DL (ref 6–20)
BUN/CREAT SERPL: 13 (ref 12–20)
CALCIUM SERPL-MCNC: 7.7 MG/DL (ref 8.5–10.1)
CHLORIDE SERPL-SCNC: 108 MMOL/L (ref 97–108)
CO2 SERPL-SCNC: 26 MMOL/L (ref 21–32)
CREAT SERPL-MCNC: 0.7 MG/DL (ref 0.7–1.3)
EOSINOPHIL # BLD: 0.1 K/UL (ref 0–0.4)
EOSINOPHIL NFR BLD: 1 % (ref 0–7)
ERYTHROCYTE [DISTWIDTH] IN BLOOD BY AUTOMATED COUNT: 14.2 % (ref 11.5–14.5)
GLOBULIN SER CALC-MCNC: 4.3 G/DL (ref 2–4)
GLUCOSE BLD STRIP.AUTO-MCNC: 107 MG/DL (ref 65–100)
GLUCOSE SERPL-MCNC: 114 MG/DL (ref 65–100)
HCT VFR BLD AUTO: 28.6 % (ref 36.6–50.3)
HGB BLD-MCNC: 9.5 G/DL (ref 12.1–17)
LYMPHOCYTES # BLD: 0.3 K/UL (ref 0.8–3.5)
LYMPHOCYTES NFR BLD: 4 % (ref 12–49)
MAGNESIUM SERPL-MCNC: 2.1 MG/DL (ref 1.6–2.4)
MCH RBC QN AUTO: 30.6 PG (ref 26–34)
MCHC RBC AUTO-ENTMCNC: 33.2 G/DL (ref 30–36.5)
MCV RBC AUTO: 92.3 FL (ref 80–99)
MONOCYTES # BLD: 0.3 K/UL (ref 0–1)
MONOCYTES NFR BLD: 4 % (ref 5–13)
NEUTS SEG # BLD: 7.7 K/UL (ref 1.8–8)
NEUTS SEG NFR BLD: 91 % (ref 32–75)
PHOSPHATE SERPL-MCNC: 1.5 MG/DL (ref 2.6–4.7)
PLATELET # BLD AUTO: 243 K/UL (ref 150–400)
POTASSIUM SERPL-SCNC: 3.9 MMOL/L (ref 3.5–5.1)
PROT SERPL-MCNC: 5.9 G/DL (ref 6.4–8.2)
RBC # BLD AUTO: 3.1 M/UL (ref 4.1–5.7)
SERVICE CMNT-IMP: ABNORMAL
SODIUM SERPL-SCNC: 142 MMOL/L (ref 136–145)
WBC # BLD AUTO: 8.5 K/UL (ref 4.1–11.1)

## 2017-12-20 PROCEDURE — 74011250637 HC RX REV CODE- 250/637: Performed by: HOSPITALIST

## 2017-12-20 PROCEDURE — 74011250636 HC RX REV CODE- 250/636: Performed by: INTERNAL MEDICINE

## 2017-12-20 PROCEDURE — 82962 GLUCOSE BLOOD TEST: CPT

## 2017-12-20 PROCEDURE — 84100 ASSAY OF PHOSPHORUS: CPT | Performed by: INTERNAL MEDICINE

## 2017-12-20 PROCEDURE — 74011000250 HC RX REV CODE- 250: Performed by: HOSPITALIST

## 2017-12-20 PROCEDURE — 74011250637 HC RX REV CODE- 250/637: Performed by: PSYCHIATRY & NEUROLOGY

## 2017-12-20 PROCEDURE — 74011250636 HC RX REV CODE- 250/636: Performed by: HOSPITALIST

## 2017-12-20 PROCEDURE — 36415 COLL VENOUS BLD VENIPUNCTURE: CPT | Performed by: INTERNAL MEDICINE

## 2017-12-20 PROCEDURE — 85025 COMPLETE CBC W/AUTO DIFF WBC: CPT | Performed by: HOSPITALIST

## 2017-12-20 PROCEDURE — 65660000000 HC RM CCU STEPDOWN

## 2017-12-20 PROCEDURE — 83735 ASSAY OF MAGNESIUM: CPT | Performed by: INTERNAL MEDICINE

## 2017-12-20 PROCEDURE — 92526 ORAL FUNCTION THERAPY: CPT | Performed by: SPEECH-LANGUAGE PATHOLOGIST

## 2017-12-20 PROCEDURE — 74011250637 HC RX REV CODE- 250/637: Performed by: FAMILY MEDICINE

## 2017-12-20 PROCEDURE — 93005 ELECTROCARDIOGRAM TRACING: CPT

## 2017-12-20 PROCEDURE — 74011250637 HC RX REV CODE- 250/637: Performed by: INTERNAL MEDICINE

## 2017-12-20 PROCEDURE — 80053 COMPREHEN METABOLIC PANEL: CPT | Performed by: INTERNAL MEDICINE

## 2017-12-20 PROCEDURE — 97530 THERAPEUTIC ACTIVITIES: CPT

## 2017-12-20 RX ORDER — CARBIDOPA AND LEVODOPA 25; 100 MG/1; MG/1
1 TABLET ORAL 4 TIMES DAILY
Status: DISCONTINUED | OUTPATIENT
Start: 2017-12-20 | End: 2017-12-21

## 2017-12-20 RX ORDER — DILTIAZEM HYDROCHLORIDE 5 MG/ML
10 INJECTION INTRAVENOUS ONCE
Status: DISCONTINUED | OUTPATIENT
Start: 2017-12-20 | End: 2017-12-20

## 2017-12-20 RX ORDER — DIGOXIN 0.25 MG/ML
250 INJECTION INTRAMUSCULAR; INTRAVENOUS
Status: COMPLETED | OUTPATIENT
Start: 2017-12-20 | End: 2017-12-20

## 2017-12-20 RX ORDER — ENOXAPARIN SODIUM 100 MG/ML
1 INJECTION SUBCUTANEOUS EVERY 12 HOURS
Status: DISCONTINUED | OUTPATIENT
Start: 2017-12-20 | End: 2017-12-21

## 2017-12-20 RX ORDER — METOPROLOL TARTRATE 25 MG/1
12.5 TABLET, FILM COATED ORAL 2 TIMES DAILY
Status: DISCONTINUED | OUTPATIENT
Start: 2017-12-21 | End: 2017-12-21

## 2017-12-20 RX ADMIN — LACTULOSE 20 G: 20 SOLUTION ORAL at 17:37

## 2017-12-20 RX ADMIN — POLYETHYLENE GLYCOL 3350 17 G: 17 POWDER, FOR SOLUTION ORAL at 23:43

## 2017-12-20 RX ADMIN — CARBIDOPA AND LEVODOPA 1 TABLET: 25; 100 TABLET ORAL at 14:48

## 2017-12-20 RX ADMIN — ASPIRIN 81 MG 81 MG: 81 TABLET ORAL at 23:43

## 2017-12-20 RX ADMIN — Medication 10 ML: at 06:23

## 2017-12-20 RX ADMIN — MORPHINE SULFATE 2 MG: 2 INJECTION, SOLUTION INTRAMUSCULAR; INTRAVENOUS at 03:15

## 2017-12-20 RX ADMIN — MORPHINE SULFATE 2 MG: 2 INJECTION, SOLUTION INTRAMUSCULAR; INTRAVENOUS at 18:20

## 2017-12-20 RX ADMIN — Medication 100 MG: at 08:06

## 2017-12-20 RX ADMIN — PANTOPRAZOLE SODIUM 20 MG: 40 TABLET, DELAYED RELEASE ORAL at 14:51

## 2017-12-20 RX ADMIN — DIGOXIN 250 MCG: 0.25 INJECTION INTRAMUSCULAR; INTRAVENOUS at 19:00

## 2017-12-20 RX ADMIN — ENOXAPARIN SODIUM 50 MG: 60 INJECTION SUBCUTANEOUS at 19:43

## 2017-12-20 RX ADMIN — MORPHINE SULFATE 2 MG: 2 INJECTION, SOLUTION INTRAMUSCULAR; INTRAVENOUS at 14:51

## 2017-12-20 RX ADMIN — CARBIDOPA AND LEVODOPA 1 TABLET: 25; 100 TABLET ORAL at 23:43

## 2017-12-20 RX ADMIN — METOPROLOL TARTRATE 12.5 MG: 25 TABLET ORAL at 23:46

## 2017-12-20 RX ADMIN — Medication 10 ML: at 23:50

## 2017-12-20 RX ADMIN — CARBIDOPA AND LEVODOPA 1 TABLET: 25; 100 TABLET ORAL at 17:37

## 2017-12-20 RX ADMIN — ACETAMINOPHEN 650 MG: 650 SOLUTION ORAL at 23:43

## 2017-12-20 RX ADMIN — ACETAMINOPHEN 650 MG: 650 SOLUTION ORAL at 02:15

## 2017-12-20 RX ADMIN — LATANOPROST 1 DROP: 50 SOLUTION OPHTHALMIC at 23:43

## 2017-12-20 RX ADMIN — LACTULOSE 20 G: 20 SOLUTION ORAL at 08:05

## 2017-12-20 RX ADMIN — SODIUM CHLORIDE: 900 INJECTION, SOLUTION INTRAVENOUS at 18:17

## 2017-12-20 RX ADMIN — POTASSIUM CHLORIDE AND DEXTROSE MONOHYDRATE: 150; 5 INJECTION, SOLUTION INTRAVENOUS at 08:05

## 2017-12-20 RX ADMIN — Medication 10 ML: at 14:48

## 2017-12-20 NOTE — INTERDISCIPLINARY ROUNDS
IDR/SLIDR Summary          Patient: Wade Flores MRN: [id number]    Age: 68 y.o. YOB: 1940 Room/Bed: [facility]   Admit Diagnosis: Weakness  Weakness  Principal Diagnosis: Weakness   Goals: Continue feeding, Palliative consult  Readmission: NO  Quality Measure: Not applicable  VTE Prophylaxis: Mechanical  Influenza Vaccine screening completed? YES  Pneumococcal Vaccine screening completed? NO  Mobility needs: Yes   Nutrition plan:Yes  Consults:P.T, O.T., Speech, Respiratory and Case Management    Financial concerns:No  Escalated to CM? YES  RRAT Score: 10   Interventions:H2H  Testing due for pt today?  YES  LOS: 5 days Expected length of stay 5 days  Discharge plan: home w/ home health   PCP: Sally Alanis MD  Transportation needs: Yes    Days before discharge:two or more days before discharge   Discharge disposition: Home    Signed:     Keon Harris  12/20/17  [phone]

## 2017-12-20 NOTE — PROGRESS NOTES
Problem: Falls - Risk of  Goal: *Absence of Falls  Document Ora Fall Risk and appropriate interventions in the flowsheet. Outcome: Progressing Towards Goal  Fall Risk Interventions:  Mobility Interventions: Communicate number of staff needed for ambulation/transfer, Mechanical lift, PT Consult for mobility concerns, PT Consult for assist device competence    Mentation Interventions: Adequate sleep, hydration, pain control, Door open when patient unattended, More frequent rounding    Medication Interventions: Assess postural VS orthostatic hypotension    Elimination Interventions: Call light in reach, Toileting schedule/hourly rounds             Comments: Problem: Falls - Risk of  Goal: *Absence of Falls  Document Ora Fall Risk and appropriate interventions in the flowsheet.    Outcome: Progressing Towards Goal  Fall Risk Interventions:  Mobility Interventions: Communicate number of staff needed for ambulation/transfer, Mechanical lift, PT Consult for mobility concerns, PT Consult for assist device competence     Mentation Interventions: Adequate sleep, hydration, pain control, Door open when patient unattended, More frequent rounding     Medication Interventions: Assess postural VS orthostatic hypotension     Elimination Interventions: Call light in reach, Toileting schedule/hourly rounds

## 2017-12-20 NOTE — ROUTINE PROCESS
Bedside shift change report given to Brando Greer (oncoming nurse) by Shruthi Hedrick (offgoing nurse). Report included the following information SBAR, ED Summary, Procedure Summary, Intake/Output, MAR, Recent Results and Cardiac Rhythm NSR.

## 2017-12-20 NOTE — ROUTINE PROCESS
Bedside and Verbal shift change report given to Edwin Chambers RN (oncoming nurse) by Preston Boyer (offgoing nurse). Report included the following information SBAR, Kardex, Intake/Output, MAR, Med Rec Status and Cardiac Rhythm NSR/Sinus Thompson Devoid.

## 2017-12-20 NOTE — PROGRESS NOTES
Notified patient went into new onset afib, EKG checked and showing Afib.  One time dose of digoxin IV and start patient on Anticoagulation with Lovenox, - Chads2 score of 3

## 2017-12-20 NOTE — CONSULTS
Palliative Medicine Consult  Gianni: [phone])    Patient Name: Bridgette Moreira  YOB: 1940    Date of Initial Consult: December 19, 2017  Reason for Consult: Care Decisions  Requesting Provider: Dr. Saranya Cáhvez  Primary Care Physician: Abraham Shane MD     SUMMARY:   Bridgette Moreira is a 68 y.o. with a past history of CAD, Parkinson's Disease, hypercholesterolemia, osteoporosis, prostate cancer s/p prostatectomy who was admitted on 12/15/2017 from home with a diagnosis of hypernatremia, increasing weakness, unintentional wt loss. Admission complicated by left lower lobe lung mass concerning for neoplasia but CT images reveal more of a rounded pneumonia per pulmonary notes. Dysphagia also noted. Pt scheduled for NG tube placed by IR today. Current medical issues leading to Palliative Medicine involvement include: care goals. Social: , 2 children, 1 grand child,    PALLIATIVE DIAGNOSES:   1. Dysphagia  2. Cachexia  3. Rigidity   4. Dysphonia   5. Physical debility  6. Parkinson's Disease  7. Pressure Ulcer     PLAN:   1. Met with the patient's wife for follow up today  2. We re-addressed peg tube given the pt is tolerating the NG feedings  3. Wife still in favor of peg if needed, however, she would like the patient's Parkinsons's medications to be given for a few days to see if his swallowing improves before proceeding  4. Wife did not notice any s/s of dysphagia at home and was feeding the pt his meals without incidence. Wife did recall one episode of pocketing food in the past but no coughing or choking. 5. The pt has been off of his meds for 5 days. Now that he has the NG tube, meds will be given then we will re-eval  6. Discussed with neuro and attending  7. Wife forgot the copy of the AMD and will provide tomorrow  6. Goals: start parkinson's meds,continue current level of care, full code status, feeding tube if indicated, return home  9.  Will follow up tomorrow  10. Initial consult note routed to primary continuity provider  11. Communicated plan of care with: Palliative IDT       GOALS OF CARE / TREATMENT PREFERENCES:     GOALS OF CARE: Continue current level of care. Full Code status. Return home upon dc. Patient/Health Care Proxy Stated Goals: Prolong life      TREATMENT PREFERENCES:   Code Status: Full Code    Advance Care Planning:  Advance Care Planning 12/20/2017   Patient's Healthcare Decision Maker is: Legal Next of Alex Bryant   Primary Decision Maker Name Mrs. Reba Palafox Decision Maker Phone Number [phone]   Primary Decision Maker Relationship to Patient Spouse   Confirm Advance Directive None   Patient Would Like to Complete Advance Directive Unable       Medical Interventions: Full interventions   Other Instructions:   Artificially Administered Nutrition: Feeding tube long-term, if indicated     Other:    As far as possible, the palliative care team has discussed with patient / health care proxy about goals of care / treatment preferences for patient.      HISTORY:     History obtained from: chart    CHIEF COMPLAINT: nonverbal    HPI/SUBJECTIVE:    The patient is:   [] Verbal and participatory  [x] Non-participatory due to:   Medical condition    Clinical Pain Assessment (nonverbal scale for severity on nonverbal patients):   Clinical Pain Assessment  Severity: 0     Activity (Movement): Lying quietly, normal position    Duration: for how long has pt been experiencing pain (e.g., 2 days, 1 month, years)  Frequency: how often pain is an issue (e.g., several times per day, once every few days, constant)     FUNCTIONAL ASSESSMENT:     Palliative Performance Scale (PPS):  PPS: 30       PSYCHOSOCIAL/SPIRITUAL SCREENING:     Palliative IDT has assessed this patient for cultural preferences / practices and a referral made as appropriate to needs (Cultural Services, Patient Advocacy, Ethics, etc.)    Advance Care Planning:  Advance Care Planning 12/20/2017   Patient's Healthcare Decision Maker is: Legal Next of Alex 69   Primary Decision Maker Name Mrs. Franki Velez Decision Maker Phone Number [phone]   Primary Decision Maker Relationship to Patient Spouse   Confirm Advance Directive None   Patient Would Like to Complete Advance Directive Unable       Any spiritual / Gnosticist concerns:  [] Yes /  [x] No    Caregiver Burnout:  [] Yes /  [x] No /  [] No Caregiver Present      Anticipatory grief assessment:   [x] Normal  / [] Maladaptive       ESAS Anxiety: Anxiety: 0    ESAS Depression:          REVIEW OF SYSTEMS:     Positive and pertinent negative findings in ROS are noted above in HPI. The following systems were [x] reviewed objectively / [] unable to be reviewed as noted in HPI  Other findings are noted below. Systems: constitutional, ears/nose/mouth/throat, respiratory, gastrointestinal, genitourinary, musculoskeletal, integumentary, neurologic, psychiatric, endocrine. Positive findings noted below. Modified ESAS Completed by: provider   Fatigue: 10 Drowsiness: 10     Pain: 0   Anxiety: 0 Nausea: 0   Anorexia: 9 Dyspnea: 0           Stool Occurrence(s): 1        PHYSICAL EXAM:     From RN flowsheet:  Wt Readings from Last 3 Encounters:   12/20/17 118 lb 9.7 oz (53.8 kg)   04/01/17 123 lb (55.8 kg)   11/11/16 118 lb (53.5 kg)     Blood pressure 151/90, pulse (!) 105, temperature 97.5 °F (36.4 °C), resp. rate 20, height 6' 1\" (1.854 m), weight 118 lb 9.7 oz (53.8 kg), SpO2 100 %.     Pain Scale 1: Adult Nonverbal Pain Scale  Pain Intensity 1: 0     Pain Location 1: Coccyx, Foot, Knee  Pain Orientation 1: Anterior, Posterior  Pain Description 1: Aching, Sharp  Pain Intervention(s) 1: Medication (see MAR)  Last bowel movement, if known:     Constitutional: frail, cachectic, nad  Eyes: pupils equal, anicteric  ENMT: no nasal discharge, moist mucous membranes, +NGT  Cardiovascular: regular rhythm, distal pulses intact  Respiratory: breathing not labored, symmetric  Gastrointestinal: soft non-tender, +bowel sounds  Musculoskeletal: no deformity, no tenderness to palpation  Skin: warm, dry  Neurologic: following no commands, Parkinson's disease  Psychiatric:   Other:       HISTORY:     Principal Problem:    Weakness (12/15/2017)    Active Problems:    Parkinson disease ([facility] [address] (11/7/2016)      Past Medical History:   Diagnosis Date    CAD (coronary artery disease)     Hypercholesteremia     Osteoporosis     Parkinson disease ([facility] [address]       Past Surgical History:   Procedure Laterality Date    CARDIAC SURG PROCEDURE UNLIST      bipass x 2    HX PROSTATECTOMY        History reviewed. No pertinent family history. History reviewed, no pertinent family history.   Social History   Substance Use Topics    Smoking status: Former Smoker    Smokeless tobacco: Not on file    Alcohol use No     No Known Allergies   Current Facility-Administered Medications   Medication Dose Route Frequency    pantoprazole (PROTONIX) 2 mg/mL oral suspension 20 mg  20 mg Oral ACB    carbidopa-levodopa (SINEMET)  mg per tablet 1 Tab  1 Tab Oral QID    dextrose 5% with KCl 20 mEq/L infusion   IntraVENous CONTINUOUS    lidocaine (XYLOCAINE) 2 % jelly   Mucous Membrane PRN    thiamine (B-1) tablet 100 mg  100 mg Per NG tube DAILY    pimavanserin tab 34 mg (Patient Supplied)  34 mg Per NG tube DAILY    lactulose (CHRONULAC) solution 20 g  20 g Per NG tube BID    morphine injection 2 mg  2 mg IntraVENous Q4H PRN    aspirin chewable tablet 81 mg  81 mg Oral QHS    polyethylene glycol (MIRALAX) packet 17 g  17 g Oral QHS    latanoprost (XALATAN) 0.005 % ophthalmic solution 1 Drop  1 Drop Both Eyes QHS    sodium chloride (NS) flush 5-10 mL  5-10 mL IntraVENous Q8H    sodium chloride (NS) flush 5-10 mL  5-10 mL IntraVENous PRN    acetaminophen (TYLENOL) tablet 650 mg  650 mg Oral Q4H PRN    Or    acetaminophen (TYLENOL) solution 650 mg  650 mg Per NG tube Q4H PRN    Or    acetaminophen (TYLENOL) suppository 650 mg  650 mg Rectal Q4H PRN    rivastigmine (EXELON) 9.5 mg/24 hr patch 1 Patch  1 Patch TransDERmal DAILY          LAB AND IMAGING FINDINGS:     Lab Results   Component Value Date/Time    WBC 8.5 12/20/2017 03:31 AM    HGB 9.5 12/20/2017 03:31 AM    PLATELET 422 55/77/3796 03:31 AM     Lab Results   Component Value Date/Time    Sodium 142 12/20/2017 03:31 AM    Potassium 3.9 12/20/2017 03:31 AM    Chloride 108 12/20/2017 03:31 AM    CO2 26 12/20/2017 03:31 AM    BUN 9 12/20/2017 03:31 AM    Creatinine 0.70 12/20/2017 03:31 AM    Calcium 7.7 12/20/2017 03:31 AM    Magnesium 2.1 12/20/2017 03:31 AM    Phosphorus 1.5 12/20/2017 03:31 AM      Lab Results   Component Value Date/Time    AST (SGOT) 57 12/20/2017 03:31 AM    Alk. phosphatase 82 12/20/2017 03:31 AM    Protein, total 5.9 12/20/2017 03:31 AM    Albumin 1.6 12/20/2017 03:31 AM    Globulin 4.3 12/20/2017 03:31 AM     Lab Results   Component Value Date/Time    INR 1.1 07/12/2015 12:15 PM    Prothrombin time 10.8 07/12/2015 12:15 PM    aPTT 27.0 07/12/2015 12:15 PM      No results found for: IRON, FE, TIBC, IBCT, PSAT, FERR   No results found for: PH, PCO2, PO2  No components found for: GLPOC   No results found for: CPK, CKMB             Total time: 25 minutes  Counseling / coordination time, spent as noted above: 20 minutes  > 50% counseling / coordination?: y    Prolonged service was provided for  []30 min   []75 min in face to face time in the presence of the patient, spent as noted above. Time Start:   Time End:   Note: this can only be billed with 56156 (initial) or 97017 (follow up). If multiple start / stop times, list each separately.

## 2017-12-20 NOTE — PROGRESS NOTES
Rene Mckeon is a 68 y.o. male  with Parkinson's disease who is admitted with symptoms of lethargy, generalized weakness and weight loss. He was found to have severe hypernatremia, which is being treated. 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 Has not been on an PD meds since admission. Family says he cannot take sinemet as he was having hallucinations. Quite rigid now and has dysphagia      EXAM  Exam:  Visit Vitals    /90 (BP 1 Location: Left arm, BP Patient Position: At rest)    Pulse (!) 105    Temp 97.5 °F (36.4 °C)    Resp 20    Ht 6' 1\" (1.854 m)    Wt 53.8 kg (118 lb 9.7 oz)    SpO2 100%    BMI 15.65 kg/m2     Gen:Drowsy  CV: RRR  Lungs: non labored breathing  Abd: non distending  Neuro: Minimally verbal.   CN II-XII: PERRL, EOMI, face symmetric, tongue/palate midline  Motor: Move all extremities. Rigidity in all extremities  Sensory: difficult to assess  Gait: defrred    LABS/ IMAGING  MRI Results (most recent):    Results from Hospital Encounter encounter on 12/15/17   MRI BRAIN WO CONT   Narrative EXAM:  MRI BRAIN WO CONT  INDICATION:  stroke, patient presented yesterday to the ED acute onset of  weakness. Patient also has history of Parkinson's disease. Abnormal CT  suggesting possible acute pontine infarct. TECHNIQUE: Sagittal T1, axial FLAIR, T2,T1 and gradient echo images as well as  coronal T2 weighted images and axial diffusion weighted images of the head were  obtained. COMPARISON:  CT head 12/15/17  FINDINGS:  Mild generalized prominence ventricles and sulci consistent with cerebral volume  loss. Focus of T2 hyperintensity left mo corresponding to CT abnormality is  consistent with an old chronic lacunar infarct. There is no abnormal restricted diffusion or other findings to suggest acute or  subacute brain infarction.   No evidence of hemorrhage, mass or abnormal extra-axial fluid collections. Mild periventricular white matter T2 hyperintensity probably related to mild  chronic small vessel ischemia. Normal appearing flow-voids are present in the vertebral basilar and carotid  artery systems. The craniocervical junction is unremarkable. The structures at the cranial base including paranasal sinuses are unremarkable. Impression IMPRESSION:   1. Old left mo lacunar infarct. 2. Age-related findings within the upper limits of normal.  3. No acute intracranial abnormality demonstrated. ASSESSMENT  Hospital Problems  Date Reviewed: 12/15/2017          Codes Class Noted POA    * (Principal)Weakness ICD-10-CM: R53.1  ICD-9-CM: 780.79  12/15/2017 Yes        Parkinson disease ([facility] [address] (Chronic) ICD-10-CM: G20  ICD-9-CM: 332.0  11/7/2016 Yes               PLAN  He is quite rigid now.  Not on any dopaminergic therapy  Recommend restarting sinemet low dose and continue Nuplazid which should help with hallucinations  Continue with supportive care  Minh Diaz MD

## 2017-12-20 NOTE — PROGRESS NOTES
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 Neurology consulted - continue asa  Speech eval - Recommends NPO for now  Will place NG tube for medications  Dietary consult - started on tube feeding ()    Unintentional Weight loss  CT chest showing possible lung nodule/mass - consulted pulmonary  Lung biopsy - Ct guided on , however Evaluated by radiology and doubt this Is a true lung tumor and recommends follow up ct in 6 months  Pulmonary following and defer investigations    Failure to thrive and dysphagia with Severe protein calorie nutrition  Orderd NG for feeding and medication admistration   I have also placed palliative consult to discuss with family regarding goals of care if family wishes to proceed with peg tube. 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 poor po intake  Hold Florinef  Dextrose IVF  Appreciate renal input    Code status: Full  DVT prophylaxis: SCD    Care Plan discussed with: Patient/Family  Disposition: TBD    - d/w patients wife and updated on plan, She is concerned about his Parkinson medication, I have called pharmacy and verified its not here.  RN will call ED to see if the medication is there, she also reports that he is not on sinemet anymore as he had intolerance to it.   12/17 - updated wife at bedside on plan of care  12/18 - NG placed by nursing however placement was not confirmed, removed  12/19 - Ng placed by IR - Feeding orders given      Hospital Problems  Date Reviewed: 12/15/2017          Codes Class Noted POA    * (Principal)Weakness ICD-10-CM: R53.1  ICD-9-CM: 780.79  12/15/2017 Yes                Review of Systems:   Review of systems not obtained due to patient factors. Vital Signs:    Last 24hrs VS reviewed since prior progress note. Most recent are:  Visit Vitals    /90 (BP 1 Location: Left arm, BP Patient Position: At rest)    Pulse (!) 105    Temp 97.5 °F (36.4 °C)    Resp 20    Ht 6' 1\" (1.854 m)    Wt 53.8 kg (118 lb 9.7 oz)    SpO2 100%    BMI 15.65 kg/m2         Intake/Output Summary (Last 24 hours) at 12/20/17 1209  Last data filed at 12/20/17 0800   Gross per 24 hour   Intake             1115 ml   Output              250 ml   Net              865 ml        Physical Examination:         Constitutional:  No acute distress, cooperative, pleasant   ENT:  Oral mucous Dry, oropharynx benign. Neck supple,    Resp:  CTA bilaterally. No wheezing/rhonchi/rales. No accessory muscle use   CV:  Regular rhythm, normal rate, no murmurs, gallops, rubs    GI:  Soft, non distended, non tender. normoactive bowel sounds, no hepatosplenomegaly     Musculoskeletal:  No edema, warm, 2+ pulses throughout    Neurologic:  Moves all extremities. , CN II-XII reviewed, Increased Rigidity     Psych:  Pleasant       Data Review:    Review and/or order of clinical lab test  Review and/or order of tests in the radiology section of CPT  Decision to obtain old records and/or obtain history from someone other than the patient      Labs:     Recent Labs      12/20/17   0331  12/19/17   0251   WBC  8.5  7.5   HGB  9.5*  10.2*   HCT  28.6*  31.1*   PLT  243  204     Recent Labs 12/20/17   0331  12/19/17   0251  12/18/17   0445   NA  142  147*  147*   K  3.9  3.9  3.2*   CL  108  113*  112*   CO2  26  30  28   BUN  9  11  17   CREA  0.70  0.84  0.69*   GLU  114*  140*  135*   CA  7.7*  7.7*  7.9*   MG  2.1  2.2   --    PHOS  1.5*  2.5*   --      Recent Labs      12/20/17   0331   SGOT  57*   ALT  54   AP  82   TBILI  0.7   TP  5.9*   ALB  1.6*   GLOB  4.3*     No results for input(s): INR, PTP, APTT in the last 72 hours. No lab exists for component: INREXT, INREXT   No results for input(s): FE, TIBC, PSAT, FERR in the last 72 hours. No results found for: FOL, RBCF   No results for input(s): PH, PCO2, PO2 in the last 72 hours. No results for input(s): CPK, CKNDX, TROIQ in the last 72 hours.     No lab exists for component: CPKMB  Lab Results   Component Value Date/Time    Cholesterol, total 106 12/16/2017 05:15 AM    HDL Cholesterol 26 12/16/2017 05:15 AM    LDL, calculated 58.4 12/16/2017 05:15 AM    Triglyceride 108 12/16/2017 05:15 AM    CHOL/HDL Ratio 4.1 12/16/2017 05:15 AM     Lab Results   Component Value Date/Time    Glucose (POC) 107 12/20/2017 11:58 AM    Glucose (POC) 91 11/27/2009 12:20 PM     Lab Results   Component Value Date/Time    Color YELLOW/STRAW 06/28/2016 06:27 AM    Appearance CLEAR 06/28/2016 06:27 AM    Specific gravity 1.018 06/28/2016 06:27 AM    pH (UA) 7.0 06/28/2016 06:27 AM    Protein NEGATIVE  06/28/2016 06:27 AM    Glucose NEGATIVE  06/28/2016 06:27 AM    Ketone NEGATIVE  06/28/2016 06:27 AM    Bilirubin NEGATIVE  06/28/2016 06:27 AM    Urobilinogen 0.2 06/28/2016 06:27 AM    Nitrites NEGATIVE  06/28/2016 06:27 AM    Leukocyte Esterase NEGATIVE  06/28/2016 06:27 AM    Epithelial cells FEW 06/28/2016 06:27 AM    Bacteria NEGATIVE  06/28/2016 06:27 AM    WBC 0-4 06/28/2016 06:27 AM    RBC 0-5 06/28/2016 06:27 AM     All Micro Results     Procedure Component Value Units Date/Time    URINE CULTURE HOLD SAMPLE [[id number]] Collected:  12/15/17 1826 Order Status:  Canceled Specimen:  Urine         Xr Chest Sngl V    Result Date: 12/15/2017  IMPRESSION: No acute process. Mri Brain Wo Cont    Result Date: 12/16/2017  IMPRESSION: 1. Old left mo lacunar infarct. 2. Age-related findings within the upper limits of normal. 3. No acute intracranial abnormality demonstrated. Ct Head Wo Cont    Result Date: 12/15/2017  IMPRESSION: 1. There is no acute intracranial hemorrhage. 2. Apparent area of low attenuation in the left aspect of the spines may represent an age-indeterminate infarct. Further evaluation with MRI is suggested if clinically warranted. The findings were discussed with Dr. Brigid Yadav on 12/15/2017 at 1600 hours by Dr. Daniela Grace. [phone]     Ct Chest Wo Cont    Result Date: 12/16/2017  IMPRESSION: Findings concerning for pulmonary neoplasm in this clinical setting, left lower lobe masslike lesion 16 x 27 mm in size. PET CT prior to sampling as this may represent rounded atelectasis/chronic parenchymal change. Lesion is amenable to percutaneous sampling if clinically warranted. No acute intraperitoneal process is identified. Renal hypodensities are consistent with simple cyst.    Ct Abd Pelv Wo Cont    Result Date: 12/16/2017  IMPRESSION: Findings concerning for pulmonary neoplasm in this clinical setting, left lower lobe masslike lesion 16 x 27 mm in size. PET CT prior to sampling as this may represent rounded atelectasis/chronic parenchymal change. Lesion is amenable to percutaneous sampling if clinically warranted. No acute intraperitoneal process is identified. Renal hypodensities are consistent with simple cyst.    Xr Abd Port  1 V    Result Date: 12/18/2017  IMPRESSION: Nasogastric tube in the right lower lobe bronchus. This should be removed. Xr Abd Port  1 V    Result Date: 12/18/2017  IMPRESSION: No nasogastric tube identified.  INTERPRETATION PROVIDED FOR COMPLIANCE ONLY AT NO CHARGE       Medications Reviewed:     Current Facility-Administered Medications   Medication Dose Route Frequency    dextrose 5% with KCl 20 mEq/L infusion   IntraVENous CONTINUOUS    lidocaine (XYLOCAINE) 2 % jelly   Mucous Membrane PRN    thiamine (B-1) tablet 100 mg  100 mg Per NG tube DAILY    pimavanserin tab 34 mg (Patient Supplied)  34 mg Per NG tube DAILY    lactulose (CHRONULAC) solution 20 g  20 g Per NG tube BID    morphine injection 2 mg  2 mg IntraVENous Q4H PRN    pantoprazole (PROTONIX) tablet 20 mg  20 mg Oral DAILY    aspirin chewable tablet 81 mg  81 mg Oral QHS    polyethylene glycol (MIRALAX) packet 17 g  17 g Oral QHS    latanoprost (XALATAN) 0.005 % ophthalmic solution 1 Drop  1 Drop Both Eyes QHS    sodium chloride (NS) flush 5-10 mL  5-10 mL IntraVENous Q8H    sodium chloride (NS) flush 5-10 mL  5-10 mL IntraVENous PRN    acetaminophen (TYLENOL) tablet 650 mg  650 mg Oral Q4H PRN    Or    acetaminophen (TYLENOL) solution 650 mg  650 mg Per NG tube Q4H PRN    Or    acetaminophen (TYLENOL) suppository 650 mg  650 mg Rectal Q4H PRN    rivastigmine (EXELON) 9.5 mg/24 hr patch 1 Patch  1 Patch TransDERmal DAILY     ______________________________________________________________________  EXPECTED LENGTH OF STAY: 2d 14h  ACTUAL LENGTH OF STAY:          5                 Mauricio Novoa MD

## 2017-12-20 NOTE — PROGRESS NOTES
Problem: Mobility Impaired (Adult and Pediatric)  Goal: *Acute Goals and Plan of Care (Insert Text)  Physical Therapy Goals  Initiated 12/16/2017  1. Patient will move from supine to sit and sit to supine , scoot up and down and roll side to side in bed with moderate assistance  within 7 day(s). 2.  Patient will transfer from bed to chair and chair to bed with maximal assistance using the least restrictive device within 7 day(s). 3. Patient will demonstrate good static sitting balance in 7 days. 4.  Patient will participate in active ROM exercises for all joints within 7 days. physical Therapy TREATMENT  Patient: April Kuo (50 y.o. male)  Date: 12/20/2017  Diagnosis: Weakness  Weakness Weakness       Precautions:      ASSESSMENT:  Cleared for continued attempts at mobility progression by RN. Received supine in bed with family present, note new placement of NGT. Wife confirms that up until 3weeks ago, pt was ambulatory using a RW. Pt remains minimally responsive despite max attempts, occasional attempts to vocalize made but unable to understand. Unable to follow any gross motor commands. Remains quite rigid and grimaces with attempts at B LE PROM. Wife verbalizes that she has been performing PROM with pt at home and here as instructed by home therapist. Dustin Dusting total A x2 for supine>sit as he required total A for repositioning and rolling. Discussed discharge plan and wife confirms that he will go home - if no improvements made he will required hospital bed, tilt back wheelchair, and sukumar lift. Of note, pt has not received parkinson's medications in 5+ days - medical team plans to reinitiate through NGT today. Will continue to follow and progress as able.      Progression toward goals:  []    Improving appropriately and progressing toward goals  [x]    Improving slowly and progressing toward goals  []    Not making progress toward goals and plan of care will be adjusted     PLAN:  Patient continues to benefit from skilled intervention to address the above impairments. Continue treatment per established plan of care. Discharge Recommendations:  [facility] vs home with 24/7 care and HHPT  Further Equipment Recommendations for Discharge:  TBD - may need hospital bed, tilting wheelchair, and sukumar lift if no improvement     SUBJECTIVE:   Patient stated Bethany Greenberg .     OBJECTIVE DATA SUMMARY:   Critical Behavior:  Neurologic State: Alert, Confused  Orientation Level: Unable to verbalize  Cognition: No command following, Decreased attention/concentration  Safety/Judgement: Not assessed  Functional Mobility Training:  Bed Mobility:  Rolling: Total assistance         Pain:  Pain Scale 1: Adult Nonverbal Pain Scale  Pain Intensity 1: 0      Activity Tolerance:   HR up to 90s with PROM    Please refer to the flowsheet for vital signs taken during this treatment.   After treatment:   []    Patient left in no apparent distress sitting up in chair  [x]    Patient left in no apparent distress in bed  [x]    Call bell left within reach  [x]    Nursing notified  []    Caregiver present  []    Bed alarm activated    COMMUNICATION/COLLABORATION:   The patients plan of care was discussed with: Registered Nurse    Leonid Staff, PT, DPT   Time Calculation: 19 mins

## 2017-12-20 NOTE — PROGRESS NOTES
NUTRITION brief    Recommendations:    1. Correct Phos per MD    - hypophosphatemia noted today (spoke w/ MD, planning to replete this evening)   - recheck Phos, Mg, K+ tomorrow with pt at high risk for feeding syndrome     2. At 4pm increase Jevity 1.5 to 40ml/hr + 150ml flush q4hr   - if Phos WNL tomorrow advanced to goal of 55ml/hr + 150ml flush q4hr    3. Continue daily weights       Diet: NPO  Tube feeds: Jevity 1.5 @ 25ml/hr advanced to goal of 55 ml/hr + 150ml flush q4hr  Meds: sinemet, D5 w/ KCL @ 100ml/hr, lactulose, protonix, miralax, thiamine    Chart reviewed for TF check,. Feeds started 12/19 at 4pm. Plans to advance 15ml up to 40ml/hr this afternoon. Tolerating with no issues per RN reports. Of note: hypophosphatemia noted but not repleted today. May want to replete before further tube feeds advancement with possible refeeding syndrome. Continue to monitor electrolytes closely. Wife visited in room. Plans to hold on PEG placement for now with sinemet just restarted today. Palliative following. Wife with no questions at this time regarding tube feeds. Will continue to follow for electrolytes, TF advancement/tolerance, wt trends and plan of care.    Estimated Nutrition Needs:   Kcals/day: 9146 Kcals/day (1848--2006kcal)  Protein: 74 g (74-84g (1.4-1.6g/kg))  Fluid: 1900 ml (1ml/kcal)  Based On: Kcal/kg - specify (Comment) (35-38kcal/kg)  Weight Used: Actual wt (52.8kg)    Meredith Ashton RD

## 2017-12-20 NOTE — PROGRESS NOTES
NAME: Jean Gotti        :  1940        MRN:  [id number]        Assessment :    Plan:  --Hypernatremia  Hypokalemia  Generalized weakness  Weight loss  Dehyration Sodium better (158 ->>now down to 142-> corrected appropriately)    Stop IV D5W-> increase Free water via TF/NGT to 300cc q4hrs    S/p IV KPhos given already    AM labs           Subjective:     Chief Complaint:  Remains minimally responsive with me. NGT placed yesterday. No events overnight. Review of Systems:    Symptom Y/N Comments  Symptom Y/N Comments   Fever/Chills    Chest Pain     Poor Appetite    Edema     Cough    Abdominal Pain     Sputum    Joint Pain     SOB/ZAMUDIO    Pruritis/Rash     Nausea/vomit    Tolerating PT/OT     Diarrhea    Tolerating Diet     Constipation    Other       Could not obtain due to: See above     Objective:     VITALS:   Last 24hrs VS reviewed since prior progress note. Most recent are:  Visit Vitals    /68 (BP 1 Location: Left arm, BP Patient Position: At rest)    Pulse 87    Temp 98.5 °F (36.9 °C)    Resp 16    Ht 6' 1\" (1.854 m)    Wt 53.8 kg (118 lb 9.7 oz)    SpO2 100%    BMI 15.65 kg/m2       Intake/Output Summary (Last 24 hours) at 17 1609  Last data filed at 17 0800   Gross per 24 hour   Intake              815 ml   Output                0 ml   Net              815 ml      Telemetry Reviewed:     PHYSICAL EXAM:  General: Thin, chronically ill appearing. NGT  Resp:  CTA Bilaterally. No Wheezing/Rhonchi/Rales. No access. muscle use  CV:  Regular  rhythm,  No murmur (), No Rubs, No Gallops.  No edema  GI:  Soft, Non distended, Non tender.  +Bowel sounds, no HSM      Lab Data Reviewed: (see below)    Medications Reviewed: (see below)    PMH/SH reviewed - no change compared to H&P  ________________________________________________________________________  Care Plan discussed with:  Patient     Family  y RN y    Care Manager                    Consultant:          Comments   >50% of visit spent in counseling and coordination of care       ________________________________________________________________________  Aguila Cano MD     Procedures: see electronic medical records for all procedures/Xrays and details which  were not copied into this note but were reviewed prior to creation of Plan. LABS:  Recent Labs      12/20/17 0331 12/19/17 0251   WBC  8.5  7.5   HGB  9.5*  10.2*   HCT  28.6*  31.1*   PLT  243  204     Recent Labs      12/20/17 0331 12/19/17 0251 12/18/17   0445   NA  142  147*  147*   K  3.9  3.9  3.2*   CL  108  113*  112*   CO2  26  30  28   BUN  9  11  17   CREA  0.70  0.84  0.69*   GLU  114*  140*  135*   CA  7.7*  7.7*  7.9*   MG  2.1  2.2   --    PHOS  1.5*  2.5*   --      Recent Labs      12/20/17 0331   SGOT  57*   AP  82   TP  5.9*   ALB  1.6*   GLOB  4.3*     No results for input(s): INR, PTP, APTT in the last 72 hours. No lab exists for component: INREXT, INREXT   No results for input(s): FE, TIBC, PSAT, FERR in the last 72 hours. No results found for: FOL, RBCF   No results for input(s): PH, PCO2, PO2 in the last 72 hours. No results for input(s): CPK, CKMB in the last 72 hours.     No lab exists for component: TROPONINI  No components found for: [place]  Lab Results   Component Value Date/Time    Color YELLOW/STRAW 06/28/2016 06:27 AM    Appearance CLEAR 06/28/2016 06:27 AM    Specific gravity 1.018 06/28/2016 06:27 AM    pH (UA) 7.0 06/28/2016 06:27 AM    Protein NEGATIVE  06/28/2016 06:27 AM    Glucose NEGATIVE  06/28/2016 06:27 AM    Ketone NEGATIVE  06/28/2016 06:27 AM    Bilirubin NEGATIVE  06/28/2016 06:27 AM    Urobilinogen 0.2 06/28/2016 06:27 AM    Nitrites NEGATIVE  06/28/2016 06:27 AM    Leukocyte Esterase NEGATIVE  06/28/2016 06:27 AM    Epithelial cells FEW 06/28/2016 06:27 AM    Bacteria NEGATIVE  06/28/2016 06:27 AM    WBC 0-4 06/28/2016 06:27 AM RBC 0-5 06/28/2016 06:27 AM       MEDICATIONS:  Current Facility-Administered Medications   Medication Dose Route Frequency    pantoprazole (PROTONIX) 2 mg/mL oral suspension 20 mg  20 mg Oral ACB    carbidopa-levodopa (SINEMET)  mg per tablet 1 Tab  1 Tab Oral QID    potassium phosphate 20 mmol in 0.9% sodium chloride 250 mL infusion   IntraVENous ONCE    lidocaine (XYLOCAINE) 2 % jelly   Mucous Membrane PRN    thiamine (B-1) tablet 100 mg  100 mg Per NG tube DAILY    pimavanserin tab 34 mg (Patient Supplied)  34 mg Per NG tube DAILY    lactulose (CHRONULAC) solution 20 g  20 g Per NG tube BID    morphine injection 2 mg  2 mg IntraVENous Q4H PRN    aspirin chewable tablet 81 mg  81 mg Oral QHS    polyethylene glycol (MIRALAX) packet 17 g  17 g Oral QHS    latanoprost (XALATAN) 0.005 % ophthalmic solution 1 Drop  1 Drop Both Eyes QHS    sodium chloride (NS) flush 5-10 mL  5-10 mL IntraVENous Q8H    sodium chloride (NS) flush 5-10 mL  5-10 mL IntraVENous PRN    acetaminophen (TYLENOL) tablet 650 mg  650 mg Oral Q4H PRN    Or    acetaminophen (TYLENOL) solution 650 mg  650 mg Per NG tube Q4H PRN    Or    acetaminophen (TYLENOL) suppository 650 mg  650 mg Rectal Q4H PRN    rivastigmine (EXELON) 9.5 mg/24 hr patch 1 Patch  1 Patch TransDERmal DAILY

## 2017-12-20 NOTE — PROGRESS NOTES
Problem: Dysphagia (Adult)  Goal: *Acute Goals and Plan of Care (Insert Text)  Speech therapy goals  Initiated 12/18/2017   1. Patient will participate in re-assessment of swallow function within 7 days    Speech language pathology dysphagia treatment  Patient: Sandee Smith (26 y.o. male)  Date: 12/20/2017  Diagnosis: Weakness  Weakness Weakness       Precautions: aspiration      ASSESSMENT:  Patient with absent acceptance of ice chip today despite max verbal and tactile cues and fully alert state. Oral care was provided and patient with no acknowledgement of swab in mouth. Multiple swallows initiated after oral care followed by wet vocal quality and repeated weak, wet coughing concerning for aspiration (swab was squeezed with minimal moisture in swab, only enough to provide comfort). Patient continues with decreased secretion management and poor tolerance of oral care. Do not anticipate we will see significant improvement in swallow function in the near future based on severity of deficits. Continue to suspect that deficits have been progressing for some time PTA based on patient weight and severity of swallow impairment. Progression toward goals:  []         Improving appropriately and progressing toward goals  []         Improving slowly and progressing toward goals  [x]         Not making progress toward goals and plan of care will be adjusted     PLAN:  Recommendations and Planned Interventions:  --recommend discussions regarding goals of care. If family is not interested in comfort care, would consider GI consult for discussion regarding PEG placement as it is unlikely we will see significant improvement in swallow function over the next few days to allow for safe PO intake.   Also, even if patient becomes safe for PO intake, do not anticipate he would be able to meet nutritional needs based on malnutrition on admission.    --will follow for re-assessment at the end of the week if secretion management/management of oral care improves. Patient continues to benefit from skilled intervention to address the above impairments. Continue treatment per established plan of care. Discharge Recommendations: To Be Determined     SUBJECTIVE:   Patient alert, nonverbal.  No command following    OBJECTIVE:   Cognitive and Communication Status:  Neurologic State: Alert, Confused  Orientation Level: Unable to verbalize  Cognition: No command following, Decreased attention/concentration        Safety/Judgement: Not assessed  Dysphagia Treatment:  Oral Assessment:  Oral Assessment  Labial:  (did not follow commands to assess )  P.O. Trials:  Patient Position: upright in bed   Vocal quality prior to P.O.: Wet  Consistency Presented: Ice chips  How Presented: SLP-fed/presented;Spoon     Bolus Acceptance: Absent                                     Oral Phase Severity: Severe  Pharyngeal Phase Severity : Moderate-severe (at least based on secretion management)  Exercises:  Laryngeal Exercises:                                                                                                                                   Pain:  Pain Scale 1: Adult Nonverbal Pain Scale  Pain Intensity 1: 0     After treatment:   []              Patient left in no apparent distress sitting up in chair  [x]              Patient left in no apparent distress in bed  [x]              Call bell left within reach  [x]              Nursing notified  []              Caregiver present  []              Bed alarm activated    COMMUNICATION/EDUCATION:       The patients plan of care including recommendations, planned interventions, and recommended diet changes were discussed with: Registered Nurse. Magaly Perez M.CD.  CCC-SLP   Time Calculation: 10 mins

## 2017-12-21 ENCOUNTER — APPOINTMENT (OUTPATIENT)
Dept: GENERAL RADIOLOGY | Age: 77
DRG: 981 | End: 2017-12-21
Attending: INTERNAL MEDICINE
Payer: MEDICARE

## 2017-12-21 PROBLEM — G93.41 METABOLIC ENCEPHALOPATHY: Status: ACTIVE | Noted: 2017-12-21

## 2017-12-21 LAB
APPEARANCE UR: ABNORMAL
ATRIAL RATE: 78 BPM
BACTERIA URNS QL MICRO: ABNORMAL /HPF
BILIRUB UR QL: NEGATIVE
CALCULATED R AXIS, ECG10: 32 DEGREES
CALCULATED T AXIS, ECG11: 54 DEGREES
CK SERPL-CCNC: 848 U/L (ref 39–308)
COLOR UR: ABNORMAL
DIAGNOSIS, 93000: NORMAL
EPITH CASTS URNS QL MICRO: ABNORMAL /LPF
GLUCOSE UR STRIP.AUTO-MCNC: NEGATIVE MG/DL
HGB UR QL STRIP: ABNORMAL
KETONES UR QL STRIP.AUTO: NEGATIVE MG/DL
LEUKOCYTE ESTERASE UR QL STRIP.AUTO: ABNORMAL
NITRITE UR QL STRIP.AUTO: NEGATIVE
PH UR STRIP: 6 [PH] (ref 5–8)
PHOSPHATE SERPL-MCNC: 2.7 MG/DL (ref 2.6–4.7)
PROT UR STRIP-MCNC: NEGATIVE MG/DL
Q-T INTERVAL, ECG07: 334 MS
QRS DURATION, ECG06: 90 MS
QTC CALCULATION (BEZET), ECG08: 433 MS
RBC #/AREA URNS HPF: ABNORMAL /HPF (ref 0–5)
SODIUM SERPL-SCNC: 139 MMOL/L (ref 136–145)
SP GR UR REFRACTOMETRY: 1.01 (ref 1–1.03)
UA: UC IF INDICATED,UAUC: ABNORMAL
UROBILINOGEN UR QL STRIP.AUTO: 1 EU/DL (ref 0.2–1)
VENTRICULAR RATE, ECG03: 101 BPM
WBC URNS QL MICRO: ABNORMAL /HPF (ref 0–4)

## 2017-12-21 PROCEDURE — 87077 CULTURE AEROBIC IDENTIFY: CPT | Performed by: INTERNAL MEDICINE

## 2017-12-21 PROCEDURE — 82550 ASSAY OF CK (CPK): CPT | Performed by: PSYCHIATRY & NEUROLOGY

## 2017-12-21 PROCEDURE — 36415 COLL VENOUS BLD VENIPUNCTURE: CPT | Performed by: HOSPITALIST

## 2017-12-21 PROCEDURE — 71010 XR CHEST PORT: CPT

## 2017-12-21 PROCEDURE — 87086 URINE CULTURE/COLONY COUNT: CPT | Performed by: INTERNAL MEDICINE

## 2017-12-21 PROCEDURE — 84100 ASSAY OF PHOSPHORUS: CPT | Performed by: HOSPITALIST

## 2017-12-21 PROCEDURE — 65660000000 HC RM CCU STEPDOWN

## 2017-12-21 PROCEDURE — 74011250636 HC RX REV CODE- 250/636: Performed by: HOSPITALIST

## 2017-12-21 PROCEDURE — 74011250637 HC RX REV CODE- 250/637: Performed by: HOSPITALIST

## 2017-12-21 PROCEDURE — 87040 BLOOD CULTURE FOR BACTERIA: CPT | Performed by: INTERNAL MEDICINE

## 2017-12-21 PROCEDURE — 84295 ASSAY OF SERUM SODIUM: CPT | Performed by: INTERNAL MEDICINE

## 2017-12-21 PROCEDURE — 81001 URINALYSIS AUTO W/SCOPE: CPT | Performed by: INTERNAL MEDICINE

## 2017-12-21 PROCEDURE — 74011250637 HC RX REV CODE- 250/637: Performed by: INTERNAL MEDICINE

## 2017-12-21 PROCEDURE — 74011250637 HC RX REV CODE- 250/637: Performed by: PSYCHIATRY & NEUROLOGY

## 2017-12-21 PROCEDURE — 97530 THERAPEUTIC ACTIVITIES: CPT

## 2017-12-21 PROCEDURE — 87186 SC STD MICRODIL/AGAR DIL: CPT | Performed by: INTERNAL MEDICINE

## 2017-12-21 PROCEDURE — 74011250637 HC RX REV CODE- 250/637: Performed by: FAMILY MEDICINE

## 2017-12-21 RX ORDER — CARBIDOPA AND LEVODOPA 25; 100 MG/1; MG/1
1.5 TABLET ORAL 4 TIMES DAILY
Status: DISCONTINUED | OUTPATIENT
Start: 2017-12-21 | End: 2018-01-04 | Stop reason: HOSPADM

## 2017-12-21 RX ORDER — LEVOFLOXACIN 5 MG/ML
750 INJECTION, SOLUTION INTRAVENOUS EVERY 24 HOURS
Status: DISCONTINUED | OUTPATIENT
Start: 2017-12-21 | End: 2017-12-24

## 2017-12-21 RX ADMIN — LACTULOSE 20 G: 20 SOLUTION ORAL at 08:48

## 2017-12-21 RX ADMIN — ASPIRIN 81 MG 81 MG: 81 TABLET ORAL at 23:06

## 2017-12-21 RX ADMIN — Medication 10 ML: at 14:17

## 2017-12-21 RX ADMIN — ENOXAPARIN SODIUM 50 MG: 60 INJECTION SUBCUTANEOUS at 07:55

## 2017-12-21 RX ADMIN — LACTULOSE 20 G: 20 SOLUTION ORAL at 18:14

## 2017-12-21 RX ADMIN — Medication 100 MG: at 08:48

## 2017-12-21 RX ADMIN — LEVOFLOXACIN 750 MG: 5 INJECTION, SOLUTION INTRAVENOUS at 08:46

## 2017-12-21 RX ADMIN — Medication 10 ML: at 23:07

## 2017-12-21 RX ADMIN — ACETAMINOPHEN 650 MG: 650 SOLUTION ORAL at 18:10

## 2017-12-21 RX ADMIN — METOPROLOL TARTRATE 12.5 MG: 25 TABLET ORAL at 08:48

## 2017-12-21 RX ADMIN — PANTOPRAZOLE SODIUM 20 MG: 40 TABLET, DELAYED RELEASE ORAL at 07:40

## 2017-12-21 RX ADMIN — LATANOPROST 1 DROP: 50 SOLUTION OPHTHALMIC at 23:07

## 2017-12-21 RX ADMIN — CARBIDOPA AND LEVODOPA 1 TABLET: 25; 100 TABLET ORAL at 08:48

## 2017-12-21 RX ADMIN — ACETAMINOPHEN 650 MG: 650 SOLUTION ORAL at 10:56

## 2017-12-21 RX ADMIN — CARBIDOPA AND LEVODOPA 1.5 TABLET: 25; 100 TABLET ORAL at 23:06

## 2017-12-21 RX ADMIN — CARBIDOPA AND LEVODOPA 1.5 TABLET: 25; 100 TABLET ORAL at 18:10

## 2017-12-21 RX ADMIN — Medication 10 ML: at 08:51

## 2017-12-21 NOTE — ROUTINE PROCESS
Bedside and Verbal shift change report given to Lance Mendez RN (oncoming nurse) by Fili Marks RN (offgoing nurse). Report included the following information SBAR, Kardex, Procedure Summary, Intake/Output, MAR, Accordion, Recent Results and Cardiac Rhythm Afib/NSR.

## 2017-12-21 NOTE — PROGRESS NOTES
Discussed patient with Dr. Minh Lepe. Patient has a fever today and probably will not be ready for discharge until next week per attending. PT recommends SNF rehab or home with 24/7 care and HHPT.

## 2017-12-21 NOTE — PROGRESS NOTES
NAME: Timmy Prasad        :  1940        MRN:  [id number]        Assessment :    Plan:  --Hypernatremia  Hypokalemia  Generalized weakness  Weight loss  Dehyration Sodium better (158 ->>now down to 142 yesterday) REpeat Sodium level ordered    Stopped IV D5W yesterday-> increased Free water via TF/NGT to 300cc q4hrs      AM labs           Subjective:     Chief Complaint:  Spiked a fever of 101.2. Remains minimally responsive with me. NGT/TF. Review of Systems:    Symptom Y/N Comments  Symptom Y/N Comments   Fever/Chills    Chest Pain     Poor Appetite    Edema     Cough    Abdominal Pain     Sputum    Joint Pain     SOB/ZAMUDIO    Pruritis/Rash     Nausea/vomit    Tolerating PT/OT     Diarrhea    Tolerating Diet     Constipation    Other       Could not obtain due to: See above     Objective:     VITALS:   Last 24hrs VS reviewed since prior progress note. Most recent are:  Visit Vitals    BP 99/55    Pulse 88    Temp 100.3 °F (37.9 °C)    Resp 16    Ht 6' 1\" (1.854 m)    Wt 55.9 kg (123 lb 3.8 oz)    SpO2 93%    BMI 16.26 kg/m2       Intake/Output Summary (Last 24 hours) at 17 1215  Last data filed at 17 0800   Gross per 24 hour   Intake             1920 ml   Output              400 ml   Net             1520 ml      Telemetry Reviewed:     PHYSICAL EXAM:  General: Thin, chronically ill appearing. NGT  Resp:  CTA Bilaterally. No Wheezing/Rhonchi/Rales. No access. muscle use  CV:  Regular  rhythm,  No murmur (), No Rubs, No Gallops.  No edema  GI:  Soft, Non distended, Non tender.  +Bowel sounds, no HSM      Lab Data Reviewed: (see below)    Medications Reviewed: (see below)    PMH/SH reviewed - no change compared to H&P  ________________________________________________________________________  Care Plan discussed with:  Patient     Family      RN y    Care Manager                    Consultant:          Comments >50% of visit spent in counseling and coordination of care       ________________________________________________________________________  Kavin Hinojosa MD     Procedures: see electronic medical records for all procedures/Xrays and details which  were not copied into this note but were reviewed prior to creation of Plan. LABS:  Recent Labs      12/20/17 0331 12/19/17 0251   WBC  8.5  7.5   HGB  9.5*  10.2*   HCT  28.6*  31.1*   PLT  243  204     Recent Labs      12/21/17   0505  12/20/17 0331 12/19/17 0251   NA   --   142  147*   K   --   3.9  3.9   CL   --   108  113*   CO2   --   26  30   BUN   --   9  11   CREA   --   0.70  0.84   GLU   --   114*  140*   CA   --   7.7*  7.7*   MG   --   2.1  2.2   PHOS  2.7  1.5*  2.5*     Recent Labs      12/20/17 0331   SGOT  57*   AP  82   TP  5.9*   ALB  1.6*   GLOB  4.3*     No results for input(s): INR, PTP, APTT in the last 72 hours. No lab exists for component: INREXT, INREXT   No results for input(s): FE, TIBC, PSAT, FERR in the last 72 hours. No results found for: FOL, RBCF   No results for input(s): PH, PCO2, PO2 in the last 72 hours. No results for input(s): CPK, CKMB in the last 72 hours.     No lab exists for component: TROPONINI  No components found for: [place]  Lab Results   Component Value Date/Time    Color YELLOW/STRAW 12/21/2017 12:00 AM    Appearance CLOUDY 12/21/2017 12:00 AM    Specific gravity 1.009 12/21/2017 12:00 AM    pH (UA) 6.0 12/21/2017 12:00 AM    Protein NEGATIVE  12/21/2017 12:00 AM    Glucose NEGATIVE  12/21/2017 12:00 AM    Ketone NEGATIVE  12/21/2017 12:00 AM    Bilirubin NEGATIVE  12/21/2017 12:00 AM    Urobilinogen 1.0 12/21/2017 12:00 AM    Nitrites NEGATIVE  12/21/2017 12:00 AM    Leukocyte Esterase MODERATE 12/21/2017 12:00 AM    Epithelial cells FEW 12/21/2017 12:00 AM    Bacteria 4+ 12/21/2017 12:00 AM    WBC 10-20 12/21/2017 12:00 AM    RBC 10-20 12/21/2017 12:00 AM       MEDICATIONS:  Current Facility-Administered Medications   Medication Dose Route Frequency    levoFLOXacin (LEVAQUIN) 750 mg in D5W IVPB  750 mg IntraVENous Q24H    pantoprazole (PROTONIX) 2 mg/mL oral suspension 20 mg  20 mg Oral ACB    carbidopa-levodopa (SINEMET)  mg per tablet 1 Tab  1 Tab Oral QID    enoxaparin (LOVENOX) injection 50 mg  1 mg/kg SubCUTAneous Q12H    metoprolol tartrate (LOPRESSOR) tablet 12.5 mg  12.5 mg Oral BID    lidocaine (XYLOCAINE) 2 % jelly   Mucous Membrane PRN    thiamine (B-1) tablet 100 mg  100 mg Per NG tube DAILY    pimavanserin tab 34 mg (Patient Supplied)  34 mg Per NG tube DAILY    lactulose (CHRONULAC) solution 20 g  20 g Per NG tube BID    morphine injection 2 mg  2 mg IntraVENous Q4H PRN    aspirin chewable tablet 81 mg  81 mg Oral QHS    polyethylene glycol (MIRALAX) packet 17 g  17 g Oral QHS    latanoprost (XALATAN) 0.005 % ophthalmic solution 1 Drop  1 Drop Both Eyes QHS    sodium chloride (NS) flush 5-10 mL  5-10 mL IntraVENous Q8H    sodium chloride (NS) flush 5-10 mL  5-10 mL IntraVENous PRN    acetaminophen (TYLENOL) tablet 650 mg  650 mg Oral Q4H PRN    Or    acetaminophen (TYLENOL) solution 650 mg  650 mg Per NG tube Q4H PRN    Or    acetaminophen (TYLENOL) suppository 650 mg  650 mg Rectal Q4H PRN    rivastigmine (EXELON) 9.5 mg/24 hr patch 1 Patch  1 Patch TransDERmal DAILY

## 2017-12-21 NOTE — CONSULTS
[address] [place] [address]  MR#: [id number]  : 1940  ACCOUNT #: [de-identified]   DATE OF SERVICE: 2017    This 17-year-old man is seen for evaluation of atrial fibrillation. He is currently in sinus rhythm. He had some periods of irregular heart rhythm. His EKG from 2017 shows an irregular rhythm. P waves are present with a variable MA interval, not marching through, but now back in sinus rhythm. The patient has progressive and severe Parkinson disease, is basically unresponsive, unable to give a history. He has a history of 2-vessel coronary bypass grafting in . An echo was performed on 12/15/2017 showing an EF of 60%. It is not clear why this patient is on a monitor. PAST MEDICAL HISTORY:  Coronary bypass grafting x2 in , prostate cancer with prostatectomy, severe Parkinson's, osteoporosis, hypercholesterolemia. CURRENT MEDICATIONS:  Aspirin 81 mg a day, Sinemet 25/100 four times a day, Lovenox 50 subQ q.12 h., Chronulac 20 mg daily per NG, Xalatan eyedrops, Levaquin 750 q.24 hours, metoprolol 12.5 twice daily, Protonix 20 mg before breakfast, pimavanserin 34 mg per NG, MiraLax daily, Exelon patch, thiamine 100 mg a day. ALLERGIES:  NONE KNOWN. FAMILY HISTORY:  Positive for coronary artery disease. SOCIAL HISTORY:  Retired. No cigarettes, no alcohol. Very attentive family. Progressively disabled and cachectic. REVIEW OF SYSTEMS:  Unobtainable. PHYSICAL EXAMINATION:  GENERAL:  Cachectic elderly man, minimally responsive. VITAL SIGNS:  Blood pressure 99/55, pulse 88 and regular. Monitor shows sinus rhythm. Temperatures have ranged up as high as 100.3. HEENT:  There is no JVD. Carotids are full without bruits. Sclerae are clear. LUNGS:  Clear. HEART:  Regular rate and rhythm. No murmur. ABDOMEN:  Soft, nontender. EXTREMITIES:  Without edema. Review of EKGs and echo as described.     Laboratories came in volume depleted with a sodium as high as 158, now down to 142. Normal TSH. Hemoglobin 9.5. PROBLEMS:  Supraventricular arrhythmia. Suspect it is a multifocal atrial tachycardia and not atrial fibrillation. In the overall setting there are no aggressive measures warranted. We can stop Lovenox, beta blocker. He should be off the monitor and a code status should be established and agree with palliative care or hospice.       Barbaraann Mcburney, MD [address] / [address]  D: 12/21/2017 12:16     T: 12/21/2017 12:49  JOB #: [id number]

## 2017-12-21 NOTE — INTERDISCIPLINARY ROUNDS
IDR/SLIDR Summary          Patient: Jennifer Reyna MRN: [id number]    Age: 68 y.o. YOB: 1940 Room/Bed: [facility]   Admit Diagnosis: Weakness  Weakness  Principal Diagnosis: Weakness   Goals: Continue feeding, Palliative consult  Readmission: NO  Quality Measure: Not applicable  VTE Prophylaxis: Mechanical  Influenza Vaccine screening completed? YES  Pneumococcal Vaccine screening completed? NO  Mobility needs: Yes   Nutrition plan:Yes  Consults:P.T, O.T., Speech, Respiratory and Case Management    Financial concerns:No  Escalated to CM? YES  RRAT Score: 10   Interventions:H2H  Testing due for pt today?  YES  LOS: 6 days Expected length of stay 5 days  Discharge plan: home w/ home health   PCP: Celestine Canada MD  Transportation needs: Yes    Days before discharge:two or more days before discharge   Discharge disposition: Home    Signed:     Fany Neely  12/21/17  0920 am

## 2017-12-21 NOTE — PROGRESS NOTES
12/20/17 2300   Vital Signs   Temp (!) 101.2 °F (38.4 °C)   Temp Source Axillary   Pulse (Heart Rate) 92   Heart Rate Source Monitor   Cardiac Rhythm A Fib   Resp Rate 20   O2 Sat (%) 94 %   Level of Consciousness Alert   /62   MAP (Calculated) 75   BP 1 Method Automatic   BP 1 Location Left arm   BP Patient Position At rest   MEWS Score 4   Pain 1   Pain Scale 1 Adult Nonverbal Pain Scale   Dr. Zavala Console notified. Tylenol given for fever. New orders received.

## 2017-12-21 NOTE — PROGRESS NOTES
Problem: Falls - Risk of  Goal: *Absence of Falls  Document Ora Fall Risk and appropriate interventions in the flowsheet.    Outcome: Progressing Towards Goal  Fall Risk Interventions:  Mobility Interventions: Communicate number of staff needed for ambulation/transfer, Patient to call before getting OOB, PT Consult for mobility concerns, PT Consult for assist device competence, Strengthening exercises (ROM-active/passive)    Mentation Interventions: Adequate sleep, hydration, pain control, More frequent rounding, Room close to nurse's station, Reorient patient    Medication Interventions: Evaluate medications/consider consulting pharmacy, Patient to call before getting OOB, Teach patient to arise slowly    Elimination Interventions: Call light in reach, Patient to call for help with toileting needs, Toileting schedule/hourly rounds

## 2017-12-21 NOTE — PROGRESS NOTES
Hospitalist Progress Note  Britton Carson MD  Answering service: [phone] OR [phone] from in house phone      Date of Service:  2017  NAME:  Audelia Chandler  :  1940  MRN:  [id number]      Admission Summary:     Patient admitted for weight loss and failure to thrive    Interval history / Subjective:       Patient seen,He is on NG tube and appears not to be in distress. 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   Neurology consulted - continue asa  Speech eval - Recommends NPO for now  NG tube for medications   Dietary consult - started on tube feeding ()  Aspiration Precautions    Paraoxysmal Afib  Now in NSR  New onset ()  [facility] dose dig  [facility]   Cardiology Consulted  Echo showing EF of 65%    Unintentional Weight loss  CT chest showing possible lung nodule/mass - consulted pulmonary  Lung biopsy - Ct guided on , however Evaluated by radiology and doubt this Is a true lung tumor and recommends follow up ct in 6 months  Pulmonary following and defer investigations    Failure to thrive and dysphagia with Severe protein calorie nutrition  NG for feeding and medication admistration   I have also placed palliative consult to discuss with family regarding goals of care if family wishes to proceed with peg tube - at thsi time the wife would like to hold off for now  Family wants to give a chance for parkinson medications to work before deciding on peg    Fevers  -   R/O sepsis  Possible aspiration from GERD/Reflux/ versus UTI  Check Cultures  Empiric levaquin  Pt has UTI noted too - followup cultures      Sacral ulcer - unstageable on admission  Sacral, Pressure Injury Unstageable Present on Admission:  7.5 x 5.2 x 0.4 cm 100% soft eschar with 10% pink noted around perimeter. Periwound hyperpigmented and non blanching; malodor noted; tender to touch; small serous exudate. Wound care consult  Will Obtain Surgery Eval to see if it needs debridement    Metabolic encephalopathy from Hypernatremia on admission   Slow to improve  MRI brain showing old pontine infarct  Underlying parkinsons    Hypernatremia  Resolved  Could be related to reset osmostat in mineralocorticoid use. poor po intake  Hold Florinef  Dextrose IVF  Appreciate renal input    Code status: Full  DVT prophylaxis: SCD    Care Plan discussed with: Patient/Family  Disposition: TBD     12/16 - d/w patients wife and updated on plan, She is concerned about his Parkinson medication, I have called pharmacy and verified its not here. RN will call ED to see if the medication is there, she also reports that he is not on sinemet anymore as he had intolerance to it.   12/17 - updated wife at bedside on plan of care  12/18 - NG placed by nursing however placement was not confirmed, removed  12/19 - Ng placed by IR - Feeding orders given   12/20-  Went into afib new onset. Rate controlled. palliative discussed with patients wife and she wants to leave as full code and all measures done. 12/21 - NSR on monitor. Plan of care discussed with aptients family and they would want full code. Hospital Problems  Date Reviewed: 12/15/2017          Codes Class Noted POA    * (Principal)Weakness ICD-10-CM: R53.1  ICD-9-CM: 780.79  12/15/2017 Yes        Parkinson disease ([facility] [address] (Chronic) ICD-10-CM: G20  ICD-9-CM: 332.0  11/7/2016 Yes                Review of Systems:   Review of systems not obtained due to patient factors. Vital Signs:    Last 24hrs VS reviewed since prior progress note.  Most recent are:  Visit Vitals    /49    Pulse 77    Temp 99.6 °F (37.6 °C)    Resp 18    Ht 6' 1\" (1.854 m)    Wt 55.9 kg (123 lb 3.8 oz)    SpO2 99%    BMI 16.26 kg/m2         Intake/Output Summary (Last 24 hours) at 12/21/17 [address] filed at 12/21/17 0400   Gross per 24 hour   Intake             1895 ml   Output              800 ml   Net             1095 ml        Physical Examination:         Constitutional:  No acute distress, cooperative, pleasant, NG in place   ENT:  Oral mucous Dry, oropharynx benign. Neck supple,    Resp:  Decreased at bases. No accessory muscle use   CV:  Regular rhythm, normal rate, no murmurs, gallops, rubs    GI:  Soft, non distended, non tender. normoactive bowel sounds, no hepatosplenomegaly     Musculoskeletal:  No edema, warm, 2+ pulses throughout    Neurologic:  Moves all extremities. , CN II-XII reviewed, Increased tone     Psych:  Pleasant       Data Review:    Review and/or order of clinical lab test  Review and/or order of tests in the radiology section of CPT  Decision to obtain old records and/or obtain history from someone other than the patient      Labs:     Recent Labs      12/20/17   0331  12/19/17   0251   WBC  8.5  7.5   HGB  9.5*  10.2*   HCT  28.6*  31.1*   PLT  243  204     Recent Labs      12/21/17   0505  12/20/17   0331  12/19/17   0251   NA   --   142  147*   K   --   3.9  3.9   CL   --   108  113*   CO2   --   26  30   BUN   --   9  11   CREA   --   0.70  0.84   GLU   --   114*  140*   CA   --   7.7*  7.7*   MG   --   2.1  2.2   PHOS  2.7  1.5*  2.5*     Recent Labs      12/20/17   0331   SGOT  57*   ALT  54   AP  82   TBILI  0.7   TP  5.9*   ALB  1.6*   GLOB  4.3*     No results for input(s): INR, PTP, APTT in the last 72 hours. No lab exists for component: INREXT, INREXT   No results for input(s): FE, TIBC, PSAT, FERR in the last 72 hours. No results found for: FOL, RBCF   No results for input(s): PH, PCO2, PO2 in the last 72 hours. No results for input(s): CPK, CKNDX, TROIQ in the last 72 hours.     No lab exists for component: CPKMB  Lab Results   Component Value Date/Time    Cholesterol, total 106 12/16/2017 05:15 AM    HDL Cholesterol 26 12/16/2017 05:15 AM    LDL, calculated 58.4 12/16/2017 05:15 AM    Triglyceride 108 12/16/2017 05:15 AM    CHOL/HDL Ratio 4.1 12/16/2017 05:15 AM     Lab Results   Component Value Date/Time    Glucose (POC) 107 12/20/2017 11:58 AM    Glucose (POC) 91 11/27/2009 12:20 PM     Lab Results   Component Value Date/Time    Color YELLOW/STRAW 12/21/2017 12:00 AM    Appearance CLOUDY 12/21/2017 12:00 AM    Specific gravity 1.009 12/21/2017 12:00 AM    pH (UA) 6.0 12/21/2017 12:00 AM    Protein NEGATIVE  12/21/2017 12:00 AM    Glucose NEGATIVE  12/21/2017 12:00 AM    Ketone NEGATIVE  12/21/2017 12:00 AM    Bilirubin NEGATIVE  12/21/2017 12:00 AM    Urobilinogen 1.0 12/21/2017 12:00 AM    Nitrites NEGATIVE  12/21/2017 12:00 AM    Leukocyte Esterase MODERATE 12/21/2017 12:00 AM    Epithelial cells FEW 12/21/2017 12:00 AM    Bacteria 4+ 12/21/2017 12:00 AM    WBC 10-20 12/21/2017 12:00 AM    RBC 10-20 12/21/2017 12:00 AM     All Micro Results     Procedure Component Value Units Date/Time    CULTURE, URINE [[id number]] Collected:  12/21/17 0000    Order Status:  Completed Updated:  12/21/17 0655    CULTURE, BLOOD, PAIRED [[id number]] Collected:  12/21/17 0000    Order Status:  Completed Specimen:  Blood Updated:  12/21/17 0604    URINE CULTURE HOLD SAMPLE [[id number]] Collected:  12/15/17 1826    Order Status:  Canceled Specimen:  Urine         Xr Chest Sngl V    Result Date: 12/15/2017  IMPRESSION: No acute process. Mri Brain Wo Cont    Result Date: 12/16/2017  IMPRESSION: 1. Old left mo lacunar infarct. 2. Age-related findings within the upper limits of normal. 3. No acute intracranial abnormality demonstrated. Ct Head Wo Cont    Result Date: 12/15/2017  IMPRESSION: 1. There is no acute intracranial hemorrhage. 2. Apparent area of low attenuation in the left aspect of the spines may represent an age-indeterminate infarct. Further evaluation with MRI is suggested if clinically warranted.  The findings were discussed with Dr. Hazel Deutsch on 12/15/2017 at 1600 hours by Dr. Luis Carlos Barlow. [phone]     Ct Chest Wo Cont    Result Date: 12/16/2017  IMPRESSION: Findings concerning for pulmonary neoplasm in this clinical setting, left lower lobe masslike lesion 16 x 27 mm in size. PET CT prior to sampling as this may represent rounded atelectasis/chronic parenchymal change. Lesion is amenable to percutaneous sampling if clinically warranted. No acute intraperitoneal process is identified. Renal hypodensities are consistent with simple cyst.    Ct Abd Pelv Wo Cont    Result Date: 12/16/2017  IMPRESSION: Findings concerning for pulmonary neoplasm in this clinical setting, left lower lobe masslike lesion 16 x 27 mm in size. PET CT prior to sampling as this may represent rounded atelectasis/chronic parenchymal change. Lesion is amenable to percutaneous sampling if clinically warranted. No acute intraperitoneal process is identified. Renal hypodensities are consistent with simple cyst.    Xr Chest Port    Result Date: 12/21/2017  IMPRESSION: Small left pleural effusion. Xr Abd Port  1 V    Result Date: 12/18/2017  IMPRESSION: Nasogastric tube in the right lower lobe bronchus. This should be removed. Xr Abd Port  1 V    Result Date: 12/18/2017  IMPRESSION: No nasogastric tube identified. INTERPRETATION PROVIDED FOR COMPLIANCE ONLY AT NO CHARGE       ECHO  SUMMARY:  Left ventricle: Systolic function was normal. Ejection fraction was  estimated to be 60 %. No obvious wall motion abnormalities identified in  the views obtained.     Medications Reviewed:     Current Facility-Administered Medications   Medication Dose Route Frequency    levoFLOXacin (LEVAQUIN) 500 mg in D5W IVPB  500 mg IntraVENous Q24H    pantoprazole (PROTONIX) 2 mg/mL oral suspension 20 mg  20 mg Oral ACB    carbidopa-levodopa (SINEMET)  mg per tablet 1 Tab  1 Tab Oral QID    enoxaparin (LOVENOX) injection 50 mg  1 mg/kg SubCUTAneous Q12H    metoprolol tartrate (LOPRESSOR) tablet 12.5 mg  12.5 mg Oral BID    lidocaine (XYLOCAINE) 2 % jelly   Mucous Membrane PRN    thiamine (B-1) tablet 100 mg  100 mg Per NG tube DAILY    pimavanserin tab 34 mg (Patient Supplied)  34 mg Per NG tube DAILY    lactulose (CHRONULAC) solution 20 g  20 g Per NG tube BID    morphine injection 2 mg  2 mg IntraVENous Q4H PRN    aspirin chewable tablet 81 mg  81 mg Oral QHS    polyethylene glycol (MIRALAX) packet 17 g  17 g Oral QHS    latanoprost (XALATAN) 0.005 % ophthalmic solution 1 Drop  1 Drop Both Eyes QHS    sodium chloride (NS) flush 5-10 mL  5-10 mL IntraVENous Q8H    sodium chloride (NS) flush 5-10 mL  5-10 mL IntraVENous PRN    acetaminophen (TYLENOL) tablet 650 mg  650 mg Oral Q4H PRN    Or    acetaminophen (TYLENOL) solution 650 mg  650 mg Per NG tube Q4H PRN    Or    acetaminophen (TYLENOL) suppository 650 mg  650 mg Rectal Q4H PRN    rivastigmine (EXELON) 9.5 mg/24 hr patch 1 Patch  1 Patch TransDERmal DAILY     ______________________________________________________________________  EXPECTED LENGTH OF STAY: 2d 14h  ACTUAL LENGTH OF STAY:          6                 Ezio Hinojosa MD

## 2017-12-21 NOTE — CONSULTS
[id number] [facility] at [address] [facility] Consultation    Admit Date: 12/15/2017  Reason for Consultation: sacral decubitus ulcer    HPI:  Jefry Dunn is a 68 y.o. male whom we are asked to see in consultation for sacral decubitus ulcer. Pt is unable to provide history, so information is taken from chart review and his wife at the bedside. History significant for CAD, Parkinson's Disease, hypercholesterolemia, osteoporosis, prostate cancer s/p prostatectomy who was admitted on 12/15/2017 from home with a diagnosis of hypernatremia, increasing weakness, unintentional wt loss. He lives at home with his wife who cares for him along with home health. She states the decubitus ulcer started around November 22, 2017. She put some cream on it that was recommended by his PCP, and home health has been assisting with the wound care. However it has continued to worsen. He currently has an NG that was placed by IR and he has been getting TF through this over the last few days. Per notes there has been some discussion about the possibility of placing PEG but she wanted to see how he does after his Parkinson's meds were resumed for a few days. She states she is his POA and is interested in pursuing surgery to debride the sacral wound. WBC 8.5, Hgb 9.5. T max 101.2, currently on levaquin.           Patient Active Problem List    Diagnosis Date Noted    Metabolic encephalopathy 62/45/8763    Weakness 12/15/2017    Chest pain 11/07/2016    Parkinson disease ([address] 11/07/2016     Past Medical History:   Diagnosis Date    CAD (coronary artery disease)     Hypercholesteremia     Osteoporosis     Parkinson disease ([address]       Past Surgical History:   Procedure Laterality Date    CARDIAC SURG PROCEDURE UNLIST      bipass x 2    HX PROSTATECTOMY        Social History   Substance Use Topics    Smoking status: Former Smoker    Smokeless tobacco: Not on file    Alcohol use No History reviewed. No pertinent family history. Prior to Admission medications    Medication Sig Start Date End Date Taking? Authorizing Provider   rivastigmine (EXELON) 9.5 mg/24 hr patch 1 Patch by TransDERmal route daily. Applies at 1700   Yes Historical Provider   pantoprazole (PROTONIX) 20 mg tablet Take 20 mg by mouth daily. Yes Historical Provider   acetaminophen (TYLENOL) 160 mg/5 mL liquid Take  by mouth every six (6) hours as needed for Pain. Yes Historical Provider   fludrocortisone (FLORINEF) 0.1 mg tablet Take 0.2 mg by mouth daily. Yes Historical Provider   MULTIVIT-MIN/FA/LYCOPEN/LUTEIN (CENTRUM SILVER ULTRA MEN'S PO) Take 1 Tab by mouth daily. Yes Historical Provider   pimavanserin (NUPLAZID) 17 mg tab Take 2 Tabs by mouth daily. Yes Historical Provider   Calcium-Cholecalciferol, D3, 600 mg(1,500mg) -400 unit cap Take 1 Cap by mouth two (2) times a day. Yes Historical Provider   polyethylene glycol (MIRALAX) 17 gram/dose powder Take 17 g by mouth nightly. Yes Historical Provider   latanoprost (XALATAN) 0.005 % ophthalmic solution Administer 1 Drop to both eyes nightly. Yes Historical Provider   alendronate (FOSAMAX) 70 mg tablet Take 70 mg by mouth Every Saturday. Yes Fortino Mcgarry MD   aspirin 81 mg tablet Take 81 mg by mouth nightly.    Yes Fortino Mcgarry MD     Current Facility-Administered Medications   Medication Dose Route Frequency    levoFLOXacin (LEVAQUIN) 750 mg in D5W IVPB  750 mg IntraVENous Q24H    carbidopa-levodopa (SINEMET)  mg per tablet 1.5 Tab  1.5 Tab Oral QID    pantoprazole (PROTONIX) 2 mg/mL oral suspension 20 mg  20 mg Oral ACB    lidocaine (XYLOCAINE) 2 % jelly   Mucous Membrane PRN    thiamine (B-1) tablet 100 mg  100 mg Per NG tube DAILY    pimavanserin tab 34 mg (Patient Supplied)  34 mg Per NG tube DAILY    lactulose (CHRONULAC) solution 20 g  20 g Per NG tube BID    morphine injection 2 mg  2 mg IntraVENous Q4H PRN    aspirin chewable tablet 81 mg  81 mg Oral QHS    polyethylene glycol (MIRALAX) packet 17 g  17 g Oral QHS    latanoprost (XALATAN) 0.005 % ophthalmic solution 1 Drop  1 Drop Both Eyes QHS    sodium chloride (NS) flush 5-10 mL  5-10 mL IntraVENous Q8H    sodium chloride (NS) flush 5-10 mL  5-10 mL IntraVENous PRN    acetaminophen (TYLENOL) tablet 650 mg  650 mg Oral Q4H PRN    Or    acetaminophen (TYLENOL) solution 650 mg  650 mg Per NG tube Q4H PRN    Or    acetaminophen (TYLENOL) suppository 650 mg  650 mg Rectal Q4H PRN    rivastigmine (EXELON) 9.5 mg/24 hr patch 1 Patch  1 Patch TransDERmal DAILY     No Known Allergies       Subjective:     Review of Systems:    A comprehensive review of systems was negative except for that written in the History of Present Illness. Objective:     Blood pressure 99/55, pulse 88, temperature 100.3 °F (37.9 °C), resp. rate 16, height 6' 1\" (1.854 m), weight 55.9 kg (123 lb 3.8 oz), SpO2 93 %. Temp (24hrs), Av.6 °F (37.6 °C), Min:98.1 °F (36.7 °C), Max:101.2 °F (38.4 °C)      Recent Labs      17   0331  17   0251   WBC  8.5  7.5   HGB  9.5*  10.2*   HCT  28.6*  31.1*   PLT  243  204     Recent Labs      17   1219  17   0505  17   0331  17   0251   NA  139   --   142  147*   K   --    --   3.9  3.9   CL   --    --   108  113*   CO2   --    --   26  30   GLU   --    --   114*  140*   BUN   --    --   9  11   CREA   --    --   0.70  0.84   CA   --    --   7.7*  7.7*   MG   --    --   2.1  2.2   PHOS   --   2.7  1.5*  2.5*   ALB   --    --   1.6*   --    TBILI   --    --   0.7   --    SGOT   --    --   57*   --    ALT   --    --   54   --      No results for input(s): AML, LPSE in the last 72 hours.       Intake/Output Summary (Last 24 hours) at 17 1434  Last data filed at 17 1200   Gross per 24 hour   Intake             2220 ml   Output              650 ml   Net             1570 ml       Date 17 0700 - 17 0659   Shift 2780-5127 8545-0569 8702-0426 24 Hour Total   I  N  T  A  K  E   NG/   750    Shift Total  (mL/kg) 750  (13.4)   750  (13.4)   O  U  T  P  U  T   Urine  (mL/kg/hr) 300   300    Shift Total  (mL/kg) 300  (5.4)   300  (5.4)   Weight (kg) 55.9 55.9 55.9 55.9     _____________________  Physical Exam:     General:  Eyes closed, not interactive. NG in place with TF infusing. Throat: Lips, mucosa, and tongue normal.   Neck: Supple, symmetrical, trachea midline. Lungs:   Clear to auscultation bilaterally. Heart:  Regular rate and rhythm. Skin: Sacrum with foul odor and necrosis over sacrum. Ulceration cannot be staged. Stool present, soft light Brown. Assessment:   Principal Problem:    Weakness (12/15/2017)    Active Problems:    Parkinson disease ([facility] [address] (92/3/4281)      Metabolic encephalopathy (49/32/9549)      Sacral decubitus ulcer      Plan:     Surgical plans per Dr. Jojo Valdes    Thank you for allowing us to participate in the care of this patient. Total time spent with patient: 22 minutes.     Signed By: Rupert Chapman NP     December 21, 2017

## 2017-12-21 NOTE — PROGRESS NOTES
Day #1 of Zosyn  Indication:  Sepsis of unknown origin  Current regimen:  500 mg Q24H  Abx regimen: IV levofloxacin  Recent Labs      17   0331  17   0251   WBC  8.5  7.5   CREA  0.70  0.84   BUN  9  11     Est CrCl: ~70 ml/min  Temp (24hrs), Av.3 °F (37.4 °C), Min:97.5 °F (36.4 °C), Max:101.2 °F (38.4 °C)    Cultures:    urine: pending   blood: pending    Plan: Change to levofloxacin 750 mg Q24H until source of infection has been identified. **close i-vent after initial review. Remove this text prior to posting to note.

## 2017-12-21 NOTE — ROUTINE PROCESS
Bedside shift change report given to Royal Ware (oncoming nurse) by Abram Jones (offgoing nurse). Report included the following information SBAR, ED Summary, Procedure Summary, Intake/Output, MAR, Recent Results and Cardiac Rhythm AFIB.

## 2017-12-21 NOTE — PROGRESS NOTES
Curt Hooper is a 68 y.o. male  with Parkinson's disease who is admitted with symptoms of lethargy, generalized weakness and weight loss. He was found to have severe hypernatremia, which is being treated. 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 Has not been on an PD meds since admission. Family says he cannot take sinemet as he was having hallucinations. Restarted sinemet 25/100 TID yesterday. Still has rigidity. Slightly more responsive but has spike a temp overnight and has UTI. Started on Abx. On tube feeds now. EXAM  Exam:  Visit Vitals    BP 99/55    Pulse 88    Temp 100.3 °F (37.9 °C)    Resp 16    Ht 6' 1\" (1.854 m)    Wt 55.9 kg (123 lb 3.8 oz)    SpO2 93%    BMI 16.26 kg/m2     Gen:Drowsy  CV: RRR  Lungs: non labored breathing  Abd: non distending  Neuro: Minimally verbal.   CN II-XII: PERRL, EOMI, face symmetric, tongue/palate midline  Motor: Did not move extremities to command. Rigidity in all extremities  Sensory: difficult to assess  Gait: defrred    LABS/ IMAGING  MRI Results (most recent):    Results from Hospital Encounter encounter on 12/15/17   MRI BRAIN WO CONT   Narrative EXAM:  MRI BRAIN WO CONT  INDICATION:  stroke, patient presented yesterday to the ED acute onset of  weakness. Patient also has history of Parkinson's disease. Abnormal CT  suggesting possible acute pontine infarct. TECHNIQUE: Sagittal T1, axial FLAIR, T2,T1 and gradient echo images as well as  coronal T2 weighted images and axial diffusion weighted images of the head were  obtained. COMPARISON:  CT head 12/15/17  FINDINGS:  Mild generalized prominence ventricles and sulci consistent with cerebral volume  loss. Focus of T2 hyperintensity left mo corresponding to CT abnormality is  consistent with an old chronic lacunar infarct.   There is no abnormal restricted diffusion or other findings to suggest acute or  subacute brain infarction. No evidence of hemorrhage, mass or abnormal extra-axial fluid collections. Mild periventricular white matter T2 hyperintensity probably related to mild  chronic small vessel ischemia. Normal appearing flow-voids are present in the vertebral basilar and carotid  artery systems. The craniocervical junction is unremarkable. The structures at the cranial base including paranasal sinuses are unremarkable. Impression IMPRESSION:   1. Old left mo lacunar infarct. 2. Age-related findings within the upper limits of normal.  3. No acute intracranial abnormality demonstrated. Lab Results   Component Value Date/Time    Sodium 139 12/21/2017 12:19 PM    Potassium 3.9 12/20/2017 03:31 AM    Chloride 108 12/20/2017 03:31 AM    CO2 26 12/20/2017 03:31 AM    Anion gap 8 12/20/2017 03:31 AM    Glucose 114 12/20/2017 03:31 AM    BUN 9 12/20/2017 03:31 AM    Creatinine 0.70 12/20/2017 03:31 AM    BUN/Creatinine ratio 13 12/20/2017 03:31 AM    GFR est AA >60 12/20/2017 03:31 AM    GFR est non-AA >60 12/20/2017 03:31 AM    Calcium 7.7 12/20/2017 03:31 AM    Bilirubin, total 0.7 12/20/2017 03:31 AM    AST (SGOT) 57 12/20/2017 03:31 AM    Alk.  phosphatase 82 12/20/2017 03:31 AM    Protein, total 5.9 12/20/2017 03:31 AM    Albumin 1.6 12/20/2017 03:31 AM    Globulin 4.3 12/20/2017 03:31 AM    A-G Ratio 0.4 12/20/2017 03:31 AM    ALT (SGPT) 54 12/20/2017 03:31 AM     Lab Results   Component Value Date/Time    Troponin-I, Qt. <0.04 12/15/2017 03:17 PM     No results found for: CPK, CPKX, [address] Problems  Date Reviewed: 12/15/2017          Codes Class Noted POA    Metabolic encephalopathy [id number]-[id number]: G93.41  ICD-9-CM: 348.31  12/21/2017 Unknown        * (Principal)Weakness ICD-10-CM: R53.1  ICD-9-CM: 780.79  12/15/2017 Yes        Parkinson disease ([facility] (Chronic) ICD-10-CM: G20  ICD-9-CM: 332.0  11/7/2016 Yes PLAN  He remains rigid.  Now has superimposed metabolic encephalopathy related to underlying UTI  Maintain sinemet and increase to 1.5 tabs tid  Continue treatment of underlying issues  Check CPK  Continue with supportive care  Feliz Whaley MD

## 2017-12-21 NOTE — PROGRESS NOTES
Problem: Mobility Impaired (Adult and Pediatric)  Goal: *Acute Goals and Plan of Care (Insert Text)  Physical Therapy Goals  Initiated 12/16/2017  1. Patient will move from supine to sit and sit to supine , scoot up and down and roll side to side in bed with moderate assistance  within 7 day(s). 2.  Patient will transfer from bed to chair and chair to bed with maximal assistance using the least restrictive device within 7 day(s). 3. Patient will demonstrate good static sitting balance in 7 days. 4.  Patient will participate in active ROM exercises for all joints within 7 days. physical Therapy TREATMENT  Patient: Lidya Olivier (89 y.o. male)  Date: 12/21/2017  Diagnosis: Weakness  Weakness Weakness       Precautions:    Chart, physical therapy assessment, plan of care and goals were reviewed. ASSESSMENT:  Pt was able to tolerate sitting on the EOB. Pt did initiate attempt to maintain balance but required assistance. Pt required assist of 2 to achieve EOB. Pt was able to have LE ranged but decrease bilateral knee extension. Pt was returned to bed and repositioned. Pt may benefit from SNF at discharge, per note family wants pt to return home. If that continues pt will need hospital bed, tilt in space w/c, sukumar. along with HHPT and 24/7 assist. Pt has not tolerated OOB at this time. Progression toward goals:  [x]      Improving appropriately and progressing toward goals  []      Improving slowly and progressing toward goals  []      Not making progress toward goals and plan of care will be adjusted     PLAN:  Patient continues to benefit from skilled intervention to address the above impairments. Continue treatment per established plan of care.   Discharge Recommendations:  SNF, if family declines then 24/7 assistance with HHPT  Further Equipment Recommendations for Discharge:  Hospital bed tilt in space w/c sukumar     SUBJECTIVE:   Patient non verbal    OBJECTIVE DATA SUMMARY:   Critical Behavior:  Neurologic State: Confused, Alert, Eyes open spontaneously  Orientation Level: Unable to verbalize  Cognition: No command following, Decreased attention/concentration, Decreased command following  Safety/Judgement: Not assessed  Functional Mobility Training:  Bed Mobility:  Rolling: Maximum assistance  Supine to Sit: Total assistance;Assist x2  Sit to Supine: Total assistance;Assist x2            Transfers:                                   Balance:  Sitting: Impaired  Sitting - Static: Poor (constant support)  Ambulation/Gait Training:                                                        Stairs:             Neuro Re-Education:    Therapeutic Exercises:     Pain:  Pain Scale 1: Adult Nonverbal Pain Scale  Pain Intensity 1: 0              Activity Tolerance:     Please refer to the flowsheet for vital signs taken during this treatment.   After treatment:   [] Patient left in no apparent distress sitting up in chair  [x] Patient left in no apparent distress in bed  [x] Call bell left within reach  [x] Nursing notified  [] Caregiver present  [] Bed alarm activated    COMMUNICATION/COLLABORATION:   The patients plan of care was discussed with: Registered Nurse    Yobani De Souza PTA   Time Calculation: 16 mins

## 2017-12-22 ENCOUNTER — ANESTHESIA (OUTPATIENT)
Dept: SURGERY | Age: 77
DRG: 981 | End: 2017-12-22
Payer: MEDICARE

## 2017-12-22 ENCOUNTER — ANESTHESIA EVENT (OUTPATIENT)
Dept: SURGERY | Age: 77
DRG: 981 | End: 2017-12-22
Payer: MEDICARE

## 2017-12-22 ENCOUNTER — APPOINTMENT (OUTPATIENT)
Dept: GENERAL RADIOLOGY | Age: 77
DRG: 981 | End: 2017-12-22
Attending: HOSPITALIST
Payer: MEDICARE

## 2017-12-22 LAB
ALBUMIN SERPL-MCNC: 1.6 G/DL (ref 3.5–5)
ALBUMIN/GLOB SERPL: 0.3 {RATIO} (ref 1.1–2.2)
ALP SERPL-CCNC: 93 U/L (ref 45–117)
ALT SERPL-CCNC: 26 U/L (ref 12–78)
ANION GAP SERPL CALC-SCNC: 6 MMOL/L (ref 5–15)
AST SERPL-CCNC: 62 U/L (ref 15–37)
BASOPHILS # BLD: 0 K/UL (ref 0–0.1)
BASOPHILS NFR BLD: 0 % (ref 0–1)
BILIRUB SERPL-MCNC: 0.7 MG/DL (ref 0.2–1)
BUN SERPL-MCNC: 12 MG/DL (ref 6–20)
BUN/CREAT SERPL: 15 (ref 12–20)
CALCIUM SERPL-MCNC: 7.8 MG/DL (ref 8.5–10.1)
CHLORIDE SERPL-SCNC: 103 MMOL/L (ref 97–108)
CO2 SERPL-SCNC: 28 MMOL/L (ref 21–32)
CREAT SERPL-MCNC: 0.82 MG/DL (ref 0.7–1.3)
DIFFERENTIAL METHOD BLD: ABNORMAL
EOSINOPHIL # BLD: 0.1 K/UL (ref 0–0.4)
EOSINOPHIL NFR BLD: 1 % (ref 0–7)
ERYTHROCYTE [DISTWIDTH] IN BLOOD BY AUTOMATED COUNT: 14.3 % (ref 11.5–14.5)
GLOBULIN SER CALC-MCNC: 4.8 G/DL (ref 2–4)
GLUCOSE SERPL-MCNC: 94 MG/DL (ref 65–100)
HCT VFR BLD AUTO: 29.5 % (ref 36.6–50.3)
HGB BLD-MCNC: 9.8 G/DL (ref 12.1–17)
LYMPHOCYTES # BLD: 0.1 K/UL (ref 0.8–3.5)
LYMPHOCYTES NFR BLD: 1 % (ref 12–49)
MAGNESIUM SERPL-MCNC: 2.2 MG/DL (ref 1.6–2.4)
MCH RBC QN AUTO: 30.3 PG (ref 26–34)
MCHC RBC AUTO-ENTMCNC: 33.2 G/DL (ref 30–36.5)
MCV RBC AUTO: 91.3 FL (ref 80–99)
MONOCYTES # BLD: 0.1 K/UL (ref 0–1)
MONOCYTES NFR BLD: 1 % (ref 5–13)
NEUTS BAND NFR BLD MANUAL: 4 % (ref 0–6)
NEUTS SEG # BLD: 14.1 K/UL (ref 1.8–8)
NEUTS SEG NFR BLD: 93 % (ref 32–75)
NRBC # BLD: 0 K/UL (ref 0–0.01)
NRBC BLD-RTO: 0 PER 100 WBC
PHOSPHATE SERPL-MCNC: 2.3 MG/DL (ref 2.6–4.7)
PLATELET # BLD AUTO: 254 K/UL (ref 150–400)
POTASSIUM SERPL-SCNC: 4.1 MMOL/L (ref 3.5–5.1)
PROT SERPL-MCNC: 6.4 G/DL (ref 6.4–8.2)
RBC # BLD AUTO: 3.23 M/UL (ref 4.1–5.7)
RBC MORPH BLD: ABNORMAL
RBC MORPH BLD: ABNORMAL
SODIUM SERPL-SCNC: 137 MMOL/L (ref 136–145)
WBC # BLD AUTO: 14.4 K/UL (ref 4.1–11.1)

## 2017-12-22 PROCEDURE — 77030011640 HC PAD GRND REM COVD -A: Performed by: SURGERY

## 2017-12-22 PROCEDURE — 74011250636 HC RX REV CODE- 250/636

## 2017-12-22 PROCEDURE — 65660000000 HC RM CCU STEPDOWN

## 2017-12-22 PROCEDURE — 77030018836 HC SOL IRR NACL ICUM -A: Performed by: SURGERY

## 2017-12-22 PROCEDURE — 80053 COMPREHEN METABOLIC PANEL: CPT | Performed by: INTERNAL MEDICINE

## 2017-12-22 PROCEDURE — 36415 COLL VENOUS BLD VENIPUNCTURE: CPT | Performed by: INTERNAL MEDICINE

## 2017-12-22 PROCEDURE — 74011250637 HC RX REV CODE- 250/637: Performed by: HOSPITALIST

## 2017-12-22 PROCEDURE — 76210000000 HC OR PH I REC 2 TO 2.5 HR: Performed by: SURGERY

## 2017-12-22 PROCEDURE — 77030032490 HC SLV COMPR SCD KNE COVD -B: Performed by: SURGERY

## 2017-12-22 PROCEDURE — 84100 ASSAY OF PHOSPHORUS: CPT | Performed by: INTERNAL MEDICINE

## 2017-12-22 PROCEDURE — 76010000149 HC OR TIME 1 TO 1.5 HR: Performed by: SURGERY

## 2017-12-22 PROCEDURE — 0KDP0ZZ EXTRACTION OF LEFT HIP MUSCLE, OPEN APPROACH: ICD-10-PCS | Performed by: SURGERY

## 2017-12-22 PROCEDURE — 74011000250 HC RX REV CODE- 250

## 2017-12-22 PROCEDURE — 77030018798 HC PMP KT ENTRL FED COVD -A

## 2017-12-22 PROCEDURE — 85027 COMPLETE CBC AUTOMATED: CPT | Performed by: INTERNAL MEDICINE

## 2017-12-22 PROCEDURE — 74011250637 HC RX REV CODE- 250/637: Performed by: FAMILY MEDICINE

## 2017-12-22 PROCEDURE — 76060000033 HC ANESTHESIA 1 TO 1.5 HR: Performed by: SURGERY

## 2017-12-22 PROCEDURE — 74011000250 HC RX REV CODE- 250: Performed by: SURGERY

## 2017-12-22 PROCEDURE — 77030026438 HC STYL ET INTUB CARD -A: Performed by: ANESTHESIOLOGY

## 2017-12-22 PROCEDURE — 77030031139 HC SUT VCRL2 J&J -A: Performed by: SURGERY

## 2017-12-22 PROCEDURE — 77030008684 HC TU ET CUF COVD -B: Performed by: ANESTHESIOLOGY

## 2017-12-22 PROCEDURE — 0KDN0ZZ EXTRACTION OF RIGHT HIP MUSCLE, OPEN APPROACH: ICD-10-PCS | Performed by: SURGERY

## 2017-12-22 PROCEDURE — 83735 ASSAY OF MAGNESIUM: CPT | Performed by: INTERNAL MEDICINE

## 2017-12-22 PROCEDURE — 74011250636 HC RX REV CODE- 250/636: Performed by: HOSPITALIST

## 2017-12-22 PROCEDURE — 74000 XR ABD PORT  1 V: CPT

## 2017-12-22 PROCEDURE — 74011250637 HC RX REV CODE- 250/637: Performed by: PSYCHIATRY & NEUROLOGY

## 2017-12-22 RX ORDER — PHENYLEPHRINE HCL IN 0.9% NACL 0.4MG/10ML
SYRINGE (ML) INTRAVENOUS AS NEEDED
Status: DISCONTINUED | OUTPATIENT
Start: 2017-12-22 | End: 2017-12-22 | Stop reason: HOSPADM

## 2017-12-22 RX ORDER — PROPOFOL 10 MG/ML
INJECTION, EMULSION INTRAVENOUS AS NEEDED
Status: DISCONTINUED | OUTPATIENT
Start: 2017-12-22 | End: 2017-12-22 | Stop reason: HOSPADM

## 2017-12-22 RX ORDER — LIDOCAINE HYDROCHLORIDE 20 MG/ML
INJECTION, SOLUTION EPIDURAL; INFILTRATION; INTRACAUDAL; PERINEURAL AS NEEDED
Status: DISCONTINUED | OUTPATIENT
Start: 2017-12-22 | End: 2017-12-22 | Stop reason: HOSPADM

## 2017-12-22 RX ORDER — SODIUM CHLORIDE, SODIUM LACTATE, POTASSIUM CHLORIDE, CALCIUM CHLORIDE 600; 310; 30; 20 MG/100ML; MG/100ML; MG/100ML; MG/100ML
INJECTION, SOLUTION INTRAVENOUS
Status: DISCONTINUED | OUTPATIENT
Start: 2017-12-22 | End: 2017-12-22 | Stop reason: HOSPADM

## 2017-12-22 RX ORDER — FENTANYL CITRATE 50 UG/ML
INJECTION, SOLUTION INTRAMUSCULAR; INTRAVENOUS AS NEEDED
Status: DISCONTINUED | OUTPATIENT
Start: 2017-12-22 | End: 2017-12-22 | Stop reason: HOSPADM

## 2017-12-22 RX ORDER — SUCCINYLCHOLINE CHLORIDE 20 MG/ML
INJECTION INTRAMUSCULAR; INTRAVENOUS AS NEEDED
Status: DISCONTINUED | OUTPATIENT
Start: 2017-12-22 | End: 2017-12-22 | Stop reason: HOSPADM

## 2017-12-22 RX ORDER — ROCURONIUM BROMIDE 10 MG/ML
INJECTION, SOLUTION INTRAVENOUS AS NEEDED
Status: DISCONTINUED | OUTPATIENT
Start: 2017-12-22 | End: 2017-12-22 | Stop reason: HOSPADM

## 2017-12-22 RX ORDER — HYDROMORPHONE HYDROCHLORIDE 2 MG/ML
INJECTION, SOLUTION INTRAMUSCULAR; INTRAVENOUS; SUBCUTANEOUS AS NEEDED
Status: DISCONTINUED | OUTPATIENT
Start: 2017-12-22 | End: 2017-12-22 | Stop reason: HOSPADM

## 2017-12-22 RX ADMIN — LIDOCAINE HYDROCHLORIDE 40 MG: 20 INJECTION, SOLUTION EPIDURAL; INFILTRATION; INTRACAUDAL; PERINEURAL at 10:49

## 2017-12-22 RX ADMIN — LATANOPROST 1 DROP: 50 SOLUTION OPHTHALMIC at 21:12

## 2017-12-22 RX ADMIN — SUCCINYLCHOLINE CHLORIDE 120 MG: 20 INJECTION INTRAMUSCULAR; INTRAVENOUS at 10:49

## 2017-12-22 RX ADMIN — PROPOFOL 100 MG: 10 INJECTION, EMULSION INTRAVENOUS at 10:49

## 2017-12-22 RX ADMIN — HYDROMORPHONE HYDROCHLORIDE 0.25 MG: 2 INJECTION, SOLUTION INTRAMUSCULAR; INTRAVENOUS; SUBCUTANEOUS at 11:24

## 2017-12-22 RX ADMIN — SODIUM CHLORIDE, SODIUM LACTATE, POTASSIUM CHLORIDE, CALCIUM CHLORIDE: 600; 310; 30; 20 INJECTION, SOLUTION INTRAVENOUS at 10:15

## 2017-12-22 RX ADMIN — Medication 80 MCG: at 11:04

## 2017-12-22 RX ADMIN — HYDROMORPHONE HYDROCHLORIDE 0.25 MG: 2 INJECTION, SOLUTION INTRAMUSCULAR; INTRAVENOUS; SUBCUTANEOUS at 11:33

## 2017-12-22 RX ADMIN — ROCURONIUM BROMIDE 5 MG: 10 INJECTION, SOLUTION INTRAVENOUS at 10:49

## 2017-12-22 RX ADMIN — ACETAMINOPHEN 650 MG: 650 SOLUTION ORAL at 06:35

## 2017-12-22 RX ADMIN — FENTANYL CITRATE 50 MCG: 50 INJECTION, SOLUTION INTRAMUSCULAR; INTRAVENOUS at 11:22

## 2017-12-22 RX ADMIN — Medication 10 ML: at 18:12

## 2017-12-22 RX ADMIN — FENTANYL CITRATE 25 MCG: 50 INJECTION, SOLUTION INTRAMUSCULAR; INTRAVENOUS at 10:42

## 2017-12-22 RX ADMIN — Medication 10 ML: at 06:27

## 2017-12-22 RX ADMIN — CARBIDOPA AND LEVODOPA 1.5 TABLET: 25; 100 TABLET ORAL at 09:18

## 2017-12-22 RX ADMIN — CARBIDOPA AND LEVODOPA 1.5 TABLET: 25; 100 TABLET ORAL at 18:12

## 2017-12-22 RX ADMIN — Medication 120 MCG: at 11:15

## 2017-12-22 RX ADMIN — CARBIDOPA AND LEVODOPA 1.5 TABLET: 25; 100 TABLET ORAL at 21:11

## 2017-12-22 RX ADMIN — LEVOFLOXACIN 750 MG: 5 INJECTION, SOLUTION INTRAVENOUS at 09:16

## 2017-12-22 RX ADMIN — PANTOPRAZOLE SODIUM 20 MG: 40 TABLET, DELAYED RELEASE ORAL at 06:33

## 2017-12-22 RX ADMIN — FENTANYL CITRATE 25 MCG: 50 INJECTION, SOLUTION INTRAMUSCULAR; INTRAVENOUS at 11:24

## 2017-12-22 RX ADMIN — Medication 120 MCG: at 11:07

## 2017-12-22 RX ADMIN — HYDROMORPHONE HYDROCHLORIDE 0.25 MG: 2 INJECTION, SOLUTION INTRAMUSCULAR; INTRAVENOUS; SUBCUTANEOUS at 11:50

## 2017-12-22 RX ADMIN — Medication 100 MG: at 09:18

## 2017-12-22 RX ADMIN — Medication 10 ML: at 21:06

## 2017-12-22 RX ADMIN — Medication 80 MCG: at 11:18

## 2017-12-22 RX ADMIN — POLYETHYLENE GLYCOL 3350 17 G: 17 POWDER, FOR SOLUTION ORAL at 21:11

## 2017-12-22 RX ADMIN — DAKIN'S SOLUTION 0.125% (QUARTER STRENGTH): 0.12 SOLUTION at 21:06

## 2017-12-22 RX ADMIN — ASPIRIN 81 MG 81 MG: 81 TABLET ORAL at 21:11

## 2017-12-22 RX ADMIN — HYDROMORPHONE HYDROCHLORIDE 0.25 MG: 2 INJECTION, SOLUTION INTRAMUSCULAR; INTRAVENOUS; SUBCUTANEOUS at 11:30

## 2017-12-22 NOTE — PROGRESS NOTES
NAME: Warren Blank        :  1940        MRN:  [id number]        Assessment :    Plan:  --Hypernatremia  Hypokalemia  Sacral decubitus  GNR UTI  Generalized weakness  Weight loss  Dehyration Sodium better (158 ->>now down to 137)    Decrease TF/NGT to 150cc q4hrs    IV levaquin    Remove polanco-> if okay with hospitalist    AM labs    Will sign off. Please re-consult us if needed again           Subjective:     Chief Complaint:  S/p debridement of decubitus ulcer . Low grade temp 100.9. Remains on TF. Wife at bedside    Review of Systems:    Symptom Y/N Comments  Symptom Y/N Comments   Fever/Chills    Chest Pain     Poor Appetite    Edema     Cough    Abdominal Pain     Sputum    Joint Pain     SOB/ZAMUDIO    Pruritis/Rash     Nausea/vomit    Tolerating PT/OT     Diarrhea    Tolerating Diet     Constipation    Other       Could not obtain due to: See above     Objective:     VITALS:   Last 24hrs VS reviewed since prior progress note. Most recent are:  Visit Vitals    /44    Pulse 60    Temp 97.6 °F (36.4 °C)    Resp 12    Ht 6' 1\" (1.854 m)    Wt 55.4 kg (122 lb 2.2 oz)    SpO2 99%    BMI 16.11 kg/m2       Intake/Output Summary (Last 24 hours) at 17 1720  Last data filed at 17 1600   Gross per 24 hour   Intake             1440 ml   Output              962 ml   Net              478 ml      Telemetry Reviewed:     PHYSICAL EXAM:  General: Thin, chronically ill appearing. NGT  Resp:  CTA Bilaterally. No Wheezing/Rhonchi/Rales. No access. muscle use  CV:  Regular  rhythm,  No murmur (), No Rubs, No Gallops.  No edema  GI:  Soft, Non distended, Non tender.  +Bowel sounds, no HSM      Lab Data Reviewed: (see below)    Medications Reviewed: (see below)    PMH/SH reviewed - no change compared to H&P  ________________________________________________________________________  Care Plan discussed with:  Patient     Family y    RN y    Care Manager                    Consultant:          Comments   >50% of visit spent in counseling and coordination of care       ________________________________________________________________________  Annmarie Flores MD     Procedures: see electronic medical records for all procedures/Xrays and details which  were not copied into this note but were reviewed prior to creation of Plan. LABS:  Recent Labs      12/22/17 0420 12/20/17   0331   WBC  14.4*  8.5   HGB  9.8*  9.5*   HCT  29.5*  28.6*   PLT  254  243     Recent Labs      12/22/17   0420  12/21/17   1219  12/21/17   0505  12/20/17   0331   NA  137  139   --   142   K  4.1   --    --   3.9   CL  103   --    --   108   CO2  28   --    --   26   BUN  12   --    --   9   CREA  0.82   --    --   0.70   GLU  94   --    --   114*   CA  7.8*   --    --   7.7*   MG  2.2   --    --   2.1   PHOS  2.3*   --   2.7  1.5*     Recent Labs      12/22/17 0420 12/20/17   0331   SGOT  62*  57*   AP  93  82   TP  6.4  5.9*   ALB  1.6*  1.6*   GLOB  4.8*  4.3*     No results for input(s): INR, PTP, APTT in the last 72 hours. No lab exists for component: INREXT, INREXT   No results for input(s): FE, TIBC, PSAT, FERR in the last 72 hours. No results found for: FOL, RBCF   No results for input(s): PH, PCO2, PO2 in the last 72 hours.   Recent Labs      12/21/17   1427   CPK  848*     No components found for: [place]  Lab Results   Component Value Date/Time    Color YELLOW/STRAW 12/21/2017 12:00 AM    Appearance CLOUDY 12/21/2017 12:00 AM    Specific gravity 1.009 12/21/2017 12:00 AM    pH (UA) 6.0 12/21/2017 12:00 AM    Protein NEGATIVE  12/21/2017 12:00 AM    Glucose NEGATIVE  12/21/2017 12:00 AM    Ketone NEGATIVE  12/21/2017 12:00 AM    Bilirubin NEGATIVE  12/21/2017 12:00 AM    Urobilinogen 1.0 12/21/2017 12:00 AM    Nitrites NEGATIVE  12/21/2017 12:00 AM    Leukocyte Esterase MODERATE 12/21/2017 12:00 AM    Epithelial cells FEW 12/21/2017 12:00 AM Bacteria 4+ 12/21/2017 12:00 AM    WBC 10-20 12/21/2017 12:00 AM    RBC 10-20 12/21/2017 12:00 AM       MEDICATIONS:  Current Facility-Administered Medications   Medication Dose Route Frequency    sodium hypochlorite (QUARTER STRENGTH DAKIN'S) 0.125% irrigation (bottle)   Topical TID    levoFLOXacin (LEVAQUIN) 750 mg in D5W IVPB  750 mg IntraVENous Q24H    carbidopa-levodopa (SINEMET)  mg per tablet 1.5 Tab  1.5 Tab Oral QID    pantoprazole (PROTONIX) 2 mg/mL oral suspension 20 mg  20 mg Oral ACB    lidocaine (XYLOCAINE) 2 % jelly   Mucous Membrane PRN    thiamine (B-1) tablet 100 mg  100 mg Per NG tube DAILY    pimavanserin tab 34 mg (Patient Supplied)  34 mg Per NG tube DAILY    lactulose (CHRONULAC) solution 20 g  20 g Per NG tube BID    morphine injection 2 mg  2 mg IntraVENous Q4H PRN    aspirin chewable tablet 81 mg  81 mg Oral QHS    polyethylene glycol (MIRALAX) packet 17 g  17 g Oral QHS    latanoprost (XALATAN) 0.005 % ophthalmic solution 1 Drop  1 Drop Both Eyes QHS    sodium chloride (NS) flush 5-10 mL  5-10 mL IntraVENous Q8H    sodium chloride (NS) flush 5-10 mL  5-10 mL IntraVENous PRN    acetaminophen (TYLENOL) tablet 650 mg  650 mg Oral Q4H PRN    Or    acetaminophen (TYLENOL) solution 650 mg  650 mg Per NG tube Q4H PRN    Or    acetaminophen (TYLENOL) suppository 650 mg  650 mg Rectal Q4H PRN    rivastigmine (EXELON) 9.5 mg/24 hr patch 1 Patch  1 Patch TransDERmal DAILY

## 2017-12-22 NOTE — ROUTINE PROCESS
Procedure to debride sacral wound to be performed Dr. Aneudy Reid 12/22/17 stop tube feedings at midnight. Per MD medications can be given.

## 2017-12-22 NOTE — PROGRESS NOTES
IDR/SLIDR Summary          Patient: Sherri Bloch MRN: [id number]    Age: 68 y.o. YOB: 1940 Room/Bed: [facility]   Admit Diagnosis: Weakness  Weakness  SACRAL ABSCESS  Principal Diagnosis: Weakness   Goals: sacral wound debridement today  Readmission: NO  Quality Measure: Not applicable  VTE Prophylaxis: Mechanical  Influenza Vaccine screening completed? YES  Pneumococcal Vaccine screening completed? YES  Mobility needs: Yes   Nutrition plan:Yes  Consults:P.T, O.T., Speech and Case Management    Financial concerns:No  Escalated to CM? YES  RRAT Score: 13   Interventions:  Testing due for pt today? YES  LOS: 7 days Expected length of stay 2 days  Discharge plan: home?    PCP: Mindy Wolf MD  Transportation needs: Yes    Days before discharge:two or more days before discharge   Discharge disposition: Home    Signed:     Frank Parra  12/22/2017  8:19 AM

## 2017-12-22 NOTE — INTERDISCIPLINARY ROUNDS
IDR/SLIDR Summary          Patient: Angie Allen MRN: [id number]    Age: 68 y.o. YOB: 1940 Room/Bed: [facility]   Admit Diagnosis: Weakness  Weakness  SACRAL ABSCESS  Principal Diagnosis: Weakness   Goals: Continue feeding, Palliative consult  Readmission: NO  Quality Measure: Not applicable  VTE Prophylaxis: Mechanical  Influenza Vaccine screening completed? YES  Pneumococcal Vaccine screening completed? NO  Mobility needs: Yes   Nutrition plan:Yes  Consults:P.T, O.T., Speech, Respiratory and Case Management    Financial concerns:No  Escalated to CM? YES  RRAT Score: 10   Interventions:H2H  Testing due for pt today?  YES  LOS: 9 days Expected length of stay 10-12 days  Discharge plan: home w/ home health   PCP: Bob Espinoza MD  Transportation needs: Yes    Days before discharge:two or more days before discharge   Discharge disposition: Home    Signed:     Nanette Germain RN  12/24/17  3184 am

## 2017-12-22 NOTE — PERIOP NOTES
HySept 0.5% ( sodium hypochlorite solution) soaked with Kerlix packed into sacrum wound, and ABD pad  for postop dressing

## 2017-12-22 NOTE — PROGRESS NOTES
Hospitalist Progress Note  Haresh Khalil MD  Answering service: [phone] OR 36 from in house phone      Date of Service:  2017  NAME:  Jennifer Reyna  :  1940  MRN:  [id number]      Admission Summary:     Patient admitted for weight loss and failure to thrive    Interval history / Subjective:       Patient seen,He is on NG tube and appears not to be in distress. Family sitting in the [address] saying that patient's skin looks better. 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 Neurology consulted - continue asa  Speech eval - Recommends NPO for now  NG tube for medications   Dietary consult - started on tube feeding ()  Aspiration Precautions    Paraoxysmal Afib  Now in NSR  New onset ()  Received one dose dig  On [facility]   Cardiology Consulted  Echo showing EF of 65%    Unintentional Weight loss  CT chest showing possible lung nodule/mass - consulted pulmonary  Lung biopsy - Ct guided on , however Evaluated by radiology and doubt this Is a true lung tumor and recommends follow up ct in 6 months  Pulmonary following and defer investigations    Failure to thrive and dysphagia with Severe protein calorie nutrition  NG for feeding and medication admistration   Palliative consulted to discuss with family regarding goals of care if family wishes to proceed with peg tube - for now the wife would like to hold off for now  Family wants to give a chance for parkinson medications to work before deciding on peg    UTI  On levaquin since   Ucx c/w GNR,identification pending. On levquin for now,will adjust according to culture result if necessary  Blood cx NGTD   WBC up today compared to previous. If further increase in [facility] adjust atbx.         Sacral ulcer - unstageable on admission  Sacral, Pressure Injury Unstageable Present on Admission:  7.5 x 5.2 x 0.4 cm 100% soft eschar with 10% pink noted around perimeter. Periwound hyperpigmented and non blanching; malodor noted; tender to touch; small serous exudate. Wound care consult  Surgery consulted. Sacral ulcer debridement was done today,virgil follow results    Metabolic encephalopathy from Hypernatremia on admission   Although hypernatremia resolved,mentation not improved likely due to multiple medical conditions such as advanced Parkinson's dis,infection,brain infarct. MRI brain showing old pontine infarct  Underlying parkinsons    Hypernatremia  Resolved  Could be related to reset osmostat in mineralocorticoid use. poor po intake  Hold Florinef  Dextrose IVF  Appreciate renal input    Code status: Full  DVT prophylaxis: SCD    Care Plan discussed with: Patient/Family  Disposition: TBD     12/16 - d/w patients wife and updated on plan, She is concerned about his Parkinson medication, I have called pharmacy and verified its not here. RN will call ED to see if the medication is there, she also reports that he is not on sinemet anymore as he had intolerance to it.   12/17 - updated wife at bedside on plan of care  12/18 - NG placed by nursing however placement was not confirmed, removed  12/19 - Ng placed by IR - Feeding orders given   12/20-  Went into afib new onset. Rate controlled. palliative discussed with patients wife and she wants to leave as full code and all measures done. 12/21 - NSR on monitor. Plan of care discussed with aptients family and they would want full code.       Hospital Problems  Date Reviewed: 12/15/2017          Codes Class Noted POA    Metabolic encephalopathy JLP-46-RA: G93.41  ICD-9-CM: 348.31  12/21/2017 Unknown        * (Principal)Weakness ICD-10-CM: R53.1  ICD-9-CM: 780.79  12/15/2017 Yes        Parkinson disease ([address] (Chronic) ICD-10-CM: G20  ICD-9-CM: 332.0  11/7/2016 Yes                Review of Systems:   Review of systems not obtained due to patient factors. Vital Signs:    Last 24hrs VS reviewed since prior progress note. Most recent are:  Visit Vitals    BP 91/40 (BP 1 Location: Left arm, BP Patient Position: At rest)    Pulse 60    Temp 97.6 °F (36.4 °C)    Resp 12    Ht 6' 1\" (1.854 m)    Wt 55.4 kg (122 lb 2.2 oz)    SpO2 99%    BMI 16.11 kg/m2         Intake/Output Summary (Last 24 hours) at 12/22/17 1552  Last data filed at 12/22/17 1359   Gross per 24 hour   Intake             1440 ml   Output             1262 ml   Net              178 ml        Physical Examination:         Constitutional:  No acute distress,sleeping, NG in place   ENT:  Oral mucous Dry, oropharynx benign. Neck supple,    Resp:  Decreased at bases. No accessory muscle use   CV:  Regular rhythm, normal rate, no murmurs, gallops, rubs    GI:  Soft, non distended, non tender.  normoactive bowel sounds, no hepatosplenomegaly     Musculoskeletal:  No edema, warm, 2+ pulses throughout    Neurologic:  Sleepy            Data Review:    Review and/or order of clinical lab test  Review and/or order of tests in the radiology section of CPT  Decision to obtain old records and/or obtain history from someone other than the patient      Labs:     Recent Labs      12/22/17 0420 12/20/17   0331   WBC  14.4*  8.5   HGB  9.8*  9.5*   HCT  29.5*  28.6*   PLT  254  243     Recent Labs      12/22/17   0420  12/21/17   1219  12/21/17   0505  12/20/17   0331   NA  137  139   --   142   K  4.1   --    --   3.9   CL  103   --    --   108   CO2  28   --    --   26   BUN  12   --    --   9   CREA  0.82   --    --   0.70   GLU  94   --    --   114*   CA  7.8*   --    --   7.7*   MG  2.2   --    --   2.1   PHOS  2.3*   --   2.7  1.5*     Recent Labs      12/22/17   0420  12/20/17   0331   SGOT  62*  57*   ALT  26  54   AP  93  82   TBILI  0.7  0.7   TP  6.4  5.9*   ALB  1.6*  1.6*   GLOB  4.8*  4.3*     No results for input(s): INR, PTP, APTT in the last 72 hours. No lab exists for component: INREXT, INREXT   No results for input(s): FE, TIBC, PSAT, FERR in the last 72 hours. No results found for: FOL, RBCF   No results for input(s): PH, PCO2, PO2 in the last 72 hours.   Recent Labs      12/21/17   1427   CPK  848*     Lab Results   Component Value Date/Time    Cholesterol, total 106 12/16/2017 05:15 AM    HDL Cholesterol 26 12/16/2017 05:15 AM    LDL, calculated 58.4 12/16/2017 05:15 AM    Triglyceride 108 12/16/2017 05:15 AM    CHOL/HDL Ratio 4.1 12/16/2017 05:15 AM     Lab Results   Component Value Date/Time    Glucose (POC) 107 12/20/2017 11:58 AM    Glucose (POC) 91 11/27/2009 12:20 PM     Lab Results   Component Value Date/Time    Color YELLOW/STRAW 12/21/2017 12:00 AM    Appearance CLOUDY 12/21/2017 12:00 AM    Specific gravity 1.009 12/21/2017 12:00 AM    pH (UA) 6.0 12/21/2017 12:00 AM    Protein NEGATIVE  12/21/2017 12:00 AM    Glucose NEGATIVE  12/21/2017 12:00 AM    Ketone NEGATIVE  12/21/2017 12:00 AM    Bilirubin NEGATIVE  12/21/2017 12:00 AM    Urobilinogen 1.0 12/21/2017 12:00 AM    Nitrites NEGATIVE  12/21/2017 12:00 AM    Leukocyte Esterase MODERATE 12/21/2017 12:00 AM    Epithelial cells FEW 12/21/2017 12:00 AM    Bacteria 4+ 12/21/2017 12:00 AM    WBC 10-20 12/21/2017 12:00 AM    RBC 10-20 12/21/2017 12:00 AM     All Micro Results     Procedure Component Value Units Date/Time    CULTURE, URINE [[id number]]  (Abnormal) Collected:  12/21/17 0000    Order Status:  Completed Specimen:  Urine Updated:  12/22/17 1038     Special Requests: --        NO SPECIAL REQUESTS  Reflexed from [id number]       [place] Count --        >100,000  COLONIES/mL       Culture result: GRAM NEGATIVE RODS (A)       CULTURE, BLOOD, PAIRED [[id number]] Collected:  12/21/17 0000    Order Status:  Completed Specimen:  Blood Updated:  12/22/17 0533     Special Requests: NO SPECIAL REQUESTS        Culture result: NO GROWTH AFTER 23 HOURS       URINE CULTURE HOLD SAMPLE [[id number]] Collected:  12/15/17 1826    Order Status:  Canceled Specimen:  Urine         Xr Chest Sngl V    Result Date: 12/15/2017  IMPRESSION: No acute process. Mri Brain Wo Cont    Result Date: 12/16/2017  IMPRESSION: 1. Old left mo lacunar infarct. 2. Age-related findings within the upper limits of normal. 3. No acute intracranial abnormality demonstrated. Ct Head Wo Cont    Result Date: 12/15/2017  IMPRESSION: 1. There is no acute intracranial hemorrhage. 2. Apparent area of low attenuation in the left aspect of the spines may represent an age-indeterminate infarct. Further evaluation with MRI is suggested if clinically warranted. The findings were discussed with Dr. Tamra Jennings on 12/15/2017 at 1600 hours by Dr. Clark Romero. [phone]     Ct Chest Wo Cont    Result Date: 12/16/2017  IMPRESSION: Findings concerning for pulmonary neoplasm in this clinical setting, left lower lobe masslike lesion 16 x 27 mm in size. PET CT prior to sampling as this may represent rounded atelectasis/chronic parenchymal change. Lesion is amenable to percutaneous sampling if clinically warranted. No acute intraperitoneal process is identified. Renal hypodensities are consistent with simple cyst.    Ct Abd Pelv Wo Cont    Result Date: 12/16/2017  IMPRESSION: Findings concerning for pulmonary neoplasm in this clinical setting, left lower lobe masslike lesion 16 x 27 mm in size. PET CT prior to sampling as this may represent rounded atelectasis/chronic parenchymal change. Lesion is amenable to percutaneous sampling if clinically warranted. No acute intraperitoneal process is identified. Renal hypodensities are consistent with simple cyst.    Xr Chest Port    Result Date: 12/21/2017  IMPRESSION: Small left pleural effusion. Xr Abd Port  1 V    Result Date: 12/22/2017  IMPRESSION: NG tube satisfactory position. Xr Abd Port  1 V    Result Date: 12/18/2017  IMPRESSION: Nasogastric tube in the right lower lobe bronchus.  This should be removed. Xr Abd Port  1 V    Result Date: 12/18/2017  IMPRESSION: No nasogastric tube identified. INTERPRETATION PROVIDED FOR COMPLIANCE ONLY AT NO CHARGE       ECHO  SUMMARY:  Left ventricle: Systolic function was normal. Ejection fraction was  estimated to be 60 %. No obvious wall motion abnormalities identified in  the views obtained.     Medications Reviewed:     Current Facility-Administered Medications   Medication Dose Route Frequency    sodium hypochlorite (QUARTER STRENGTH DAKIN'S) 0.125% irrigation (bottle)   Topical TID    levoFLOXacin (LEVAQUIN) 750 mg in D5W IVPB  750 mg IntraVENous Q24H    carbidopa-levodopa (SINEMET)  mg per tablet 1.5 Tab  1.5 Tab Oral QID    pantoprazole (PROTONIX) 2 mg/mL oral suspension 20 mg  20 mg Oral ACB    lidocaine (XYLOCAINE) 2 % jelly   Mucous Membrane PRN    thiamine (B-1) tablet 100 mg  100 mg Per NG tube DAILY    pimavanserin tab 34 mg (Patient Supplied)  34 mg Per NG tube DAILY    lactulose (CHRONULAC) solution 20 g  20 g Per NG tube BID    morphine injection 2 mg  2 mg IntraVENous Q4H PRN    aspirin chewable tablet 81 mg  81 mg Oral QHS    polyethylene glycol (MIRALAX) packet 17 g  17 g Oral QHS    latanoprost (XALATAN) 0.005 % ophthalmic solution 1 Drop  1 Drop Both Eyes QHS    sodium chloride (NS) flush 5-10 mL  5-10 mL IntraVENous Q8H    sodium chloride (NS) flush 5-10 mL  5-10 mL IntraVENous PRN    acetaminophen (TYLENOL) tablet 650 mg  650 mg Oral Q4H PRN    Or    acetaminophen (TYLENOL) solution 650 mg  650 mg Per NG tube Q4H PRN    Or    acetaminophen (TYLENOL) suppository 650 mg  650 mg Rectal Q4H PRN    rivastigmine (EXELON) 9.5 mg/24 hr patch 1 Patch  1 Patch TransDERmal DAILY     ______________________________________________________________________  EXPECTED LENGTH OF STAY: 2d 14h  ACTUAL LENGTH OF STAY:          7                 Selina Page MD

## 2017-12-22 NOTE — PERIOP NOTES
TRANSFER - OUT REPORT:    Verbal report given to Gloria (name) on Elena Garcia  being transferred to NSTU (unit) for routine post - op       Report consisted of patients Situation, Background, Assessment and   Recommendations(SBAR). Time Pre op antibiotic [id number]   Anesthesia Stop time: 1656  Matias Present on Transfer to floor:yes  Order for Matias on Chart:yes    Information from the following report(s) SBAR, Kardex, OR Summary, Intake/Output, MAR, Med Rec Status and Cardiac Rhythm SR was reviewed with the receiving nurse. Opportunity for questions and clarification was provided. Is the patient on 02? YES       L/Min 2L      Is the patient on a monitor? YES    Is the nurse transporting with the patient? YES    Surgical Waiting Area notified of patient's transfer from PACU?  YES      The following personal items collected during your admission accompanied patient upon transfer:   Dental Appliance: Dental Appliances: None  Vision: Visual Aid: Glasses  Hearing Aid:    Jewelry:    Clothing: Clothing: Pajamas, With patient

## 2017-12-22 NOTE — WOUND CARE
WOCN Note    Reconsult for wound vac evaluation. Patient s/p Debridement of sacral wound today. Wound care orders in place. Will evaluate wound for appropriateness of NPWT on Tuesday.     Aniyah ENRIQUEZ RN  Wound Care  Office [phone]

## 2017-12-22 NOTE — PROGRESS NOTES
Problem: Falls - Risk of  Goal: *Absence of Falls  Document Ora Fall Risk and appropriate interventions in the flowsheet.    Outcome: Progressing Towards Goal  Fall Risk Interventions:  Mobility Interventions: Communicate number of staff needed for ambulation/transfer, OT consult for ADLs, Patient to call before getting OOB, PT Consult for mobility concerns, PT Consult for assist device competence    Mentation Interventions: Adequate sleep, hydration, pain control, Door open when patient unattended, Evaluate medications/consider consulting pharmacy, Increase mobility, More frequent rounding, Reorient patient    Medication Interventions: Evaluate medications/consider consulting pharmacy, Patient to call before getting OOB, Teach patient to arise slowly    Elimination Interventions: Call light in reach

## 2017-12-22 NOTE — BRIEF OP NOTE
BRIEF OPERATIVE NOTE    Date of Procedure: 12/22/2017   Preoperative Diagnosis: SACRAL ABSCESS  Postoperative Diagnosis: SACRAL ABSCESS    Procedure(s):  DEBRIDEMENT OF SACRAL ULCER    Surgeon(s) and Role:     * Queen Katarzyna MD - Primary         Assistant Staff:       Surgical Staff:  Circ-1: Madison Martinez  Scrub Tech-1: Maira Perez  Event Time In   Incision Start 1119   Incision Close 1146     Anesthesia: General   Estimated Blood Loss: minimal  Specimens: * No specimens in log *   Findings: 12 x 15 cm pressure necrosis removed and debrided   Complications: none  Implants: * No implants in log *    [id number]

## 2017-12-22 NOTE — ROUTINE PROCESS
Bedside shift change report given to Popeye Moncada (oncoming nurse) by Damir Huertas (offgoing nurse). Report included the following information SBAR, ED Summary, Intake/Output, MAR, Recent Results and Cardiac Rhythm NSR-AFIB.

## 2017-12-22 NOTE — ROUTINE PROCESS
Patient: Zac Orosco MRN: [id number]  SSN: [id number]   YOB: 1940  Age: 68 y.o. Sex: male     Patient is status post Procedure(s):  DEBRIDEMENT OF SACRAL ULCER  REQ 1000.     Surgeon(s) and Role:     * Mayur Novoa MD - Primary                  Peripheral IV 94/59/09 Right Basilic (Active)   Site Assessment Clean, dry, & intact 12/22/2017  8:00 AM   Phlebitis Assessment 0 12/22/2017  8:00 AM   Infiltration Assessment 0 12/22/2017  8:00 AM   Dressing Status Clean, dry, & intact 12/22/2017  8:00 AM   Dressing Type Transparent;Tape 12/22/2017  8:00 AM   Hub Color/Line Status Blue;Capped 12/22/2017  8:00 AM   Action Taken Open ports on tubing capped 12/22/2017  8:00 AM   Alcohol Cap Used Yes 12/22/2017  8:00 AM          Nasogastric Tube 12/19/17 (Active)   Site Assessment Clean, dry, & intact 12/22/2017  8:00 AM   Dressing Status Clean, dry, & intact 12/22/2017  8:00 AM   G Port Status Clamped 12/22/2017  8:00 AM   Action Taken Placement verified (comment) 12/22/2017  8:00 AM   Drainage Description Tan 12/21/2017  8:00 PM   Gastric Residual (mL) 0 ml 12/22/2017  8:00 AM   Tube Feeding/Formula Options Jevity 1.5 12/22/2017  8:00 AM   Tube Feeding/Verify Rate (mL/hr) 0 12/22/2017  8:00 AM   Water Flush Volume (mL) 0 mL 12/22/2017  8:00 AM   Intake (ml) 0 ml 12/22/2017  4:00 AM   Medication Volume 50 ml 12/22/2017  8:00 AM      Airway - Endotracheal Tube 12/22/17 Oral (Active)                   Dressing/Packing:  Wound Sacral/coccyx Posterior-DRESSING TYPE: ABD pad (kerkix soaked with 0.5% DySept solution abd pad) (12/22/17 1153)  Wound Ankle Right;Lateral-DRESSING TYPE: Open to air (12/22/17 0800)  Wound Knee Left-DRESSING TYPE: Open to air (12/22/17 0800)  Wound Ankle Left;Lateral-DRESSING TYPE: Open to air (12/22/17 0800)  Splint/Cast:  ]

## 2017-12-22 NOTE — ANESTHESIA POSTPROCEDURE EVALUATION
Post-Anesthesia Evaluation and Assessment    Patient: Radhika River MRN: [id number]  SSN: [id number]    YOB: 1940  Age: 68 y.o. Sex: male       Cardiovascular Function/Vital Signs  Visit Vitals    /44    Pulse 60    Temp 36.4 °C (97.6 °F)    Resp 12    Ht 6' 1\" (1.854 m)    Wt 55.4 kg (122 lb 2.2 oz)    SpO2 99%    BMI 16.11 kg/m2       Patient is status post general anesthesia for Procedure(s):  DEBRIDEMENT OF SACRAL ULCER  REQ 1000. Nausea/Vomiting: None    Postoperative hydration reviewed and adequate. Pain:  Pain Scale 1: Adult Nonverbal Pain Scale (12/22/17 1500)  Pain Intensity 1: 0 (12/22/17 1500)   Managed    Neurological Status:   Neuro (WDL): Exceptions to WDL (12/22/17 1330)  Neuro  Neurologic State: Drowsy; Pharmacologically induced (comment); Sleeping (Dr Talisha Zhao aware of patient status) (12/22/17 1330)  Orientation Level: Unable to verbalize (same as preop per Dr Da Rhoades report) (12/22/17 1330)  Cognition: Decreased command following;Decreased attention/concentration (12/22/17 0800)  Speech: Incomprehensible words (12/22/17 0800)  Assessment L Pupil: Pinpointed;Round (dr quinn aware) (12/22/17 1330)  Size L Pupil (mm): 3 (12/22/17 0800)  Assessment R Pupil: Pinpointed;Round (dr Talisha Zhao aware) (12/22/17 1330)  Size R Pupil (mm): 3 (12/22/17 0800)  LUE Motor Response: Rigid;Weak (12/22/17 0800)  LLE Motor Response: Rigid;Weak (12/22/17 0800)  RUE Motor Response: Rigid;Weak (12/22/17 0800)  RLE Motor Response: Rigid;Weak (12/22/17 0800)   At baseline    Mental Status and Level of Consciousness: Arousable    Pulmonary Status:   O2 Device: Nasal cannula (12/22/17 1500)   Adequate oxygenation and airway patent    Complications related to anesthesia: None    Post-anesthesia assessment completed.  No concerns    Signed By: Vernon Morocho MD     December 22, 2017

## 2017-12-22 NOTE — PROGRESS NOTES
Problem: Falls - Risk of  Goal: *Absence of Falls  Document Ora Fall Risk and appropriate interventions in the flowsheet.    Outcome: Progressing Towards Goal  Fall Risk Interventions:  Mobility Interventions: Communicate number of staff needed for ambulation/transfer, Patient to call before getting OOB, PT Consult for mobility concerns, PT Consult for assist device competence    Mentation Interventions: Adequate sleep, hydration, pain control, Door open when patient unattended, More frequent rounding, Update white board, Family/sitter at bedside    Medication Interventions: Bed/chair exit alarm, Patient to call before getting OOB, Teach patient to arise slowly    Elimination Interventions: Bed/chair exit alarm, Call light in reach, Toileting schedule/hourly rounds

## 2017-12-22 NOTE — PROGRESS NOTES
Karon Barrios is a 68 y.o. male  with Parkinson's disease who is admitted with symptoms of lethargy, generalized weakness and weight loss. He was found to have severe hypernatremia, which is being treated. 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 Had not been on an PD meds since admission and sinemet was started 2 days ago. Family says he cannot take sinemet as he was having hallucinations. Still has rigidity. Less responsive today likely due to residual effects of sedation post procedure. S/P debridement of sacral decubitus today. Has UTI and is on Abx. On tube feeds now. EXAM  Exam:  Visit Vitals    BP 91/40 (BP 1 Location: Left arm, BP Patient Position: At rest)    Pulse 60    Temp 97.6 °F (36.4 °C)    Resp 12    Ht 6' 1\" (1.854 m)    Wt 55.4 kg (122 lb 2.2 oz)    SpO2 99%    BMI 16.11 kg/m2     Gen:Drowsy  CV: RRR  Lungs: non labored breathing  Abd: non distending  Neuro: Minimally verbal.   CN II-XII: PERRL, EOMI, face symmetric, tongue/palate midline  Motor: Did not move extremities to command. Rigidity in all extremities  Sensory: difficult to assess  Gait: defrred    LABS/ IMAGING  MRI Results (most recent):    Results from Hospital Encounter encounter on 12/15/17   MRI BRAIN WO CONT   Narrative EXAM:  MRI BRAIN WO CONT  INDICATION:  stroke, patient presented yesterday to the ED acute onset of  weakness. Patient also has history of Parkinson's disease. Abnormal CT  suggesting possible acute pontine infarct. TECHNIQUE: Sagittal T1, axial FLAIR, T2,T1 and gradient echo images as well as  coronal T2 weighted images and axial diffusion weighted images of the head were  obtained. COMPARISON:  CT head 12/15/17  FINDINGS:  Mild generalized prominence ventricles and sulci consistent with cerebral volume  loss.   Focus of T2 hyperintensity left mo corresponding to CT abnormality is  consistent with an old chronic lacunar infarct. There is no abnormal restricted diffusion or other findings to suggest acute or  subacute brain infarction. No evidence of hemorrhage, mass or abnormal extra-axial fluid collections. Mild periventricular white matter T2 hyperintensity probably related to mild  chronic small vessel ischemia. Normal appearing flow-voids are present in the vertebral basilar and carotid  artery systems. The craniocervical junction is unremarkable. The structures at the cranial base including paranasal sinuses are unremarkable. Impression IMPRESSION:   1. Old left mo lacunar infarct. 2. Age-related findings within the upper limits of normal.  3. No acute intracranial abnormality demonstrated. Lab Results   Component Value Date/Time    Sodium 137 12/22/2017 04:20 AM    Potassium 4.1 12/22/2017 04:20 AM    Chloride 103 12/22/2017 04:20 AM    CO2 28 12/22/2017 04:20 AM    Anion gap 6 12/22/2017 04:20 AM    Glucose 94 12/22/2017 04:20 AM    BUN 12 12/22/2017 04:20 AM    Creatinine 0.82 12/22/2017 04:20 AM    BUN/Creatinine ratio 15 12/22/2017 04:20 AM    GFR est AA >60 12/22/2017 04:20 AM    GFR est non-AA >60 12/22/2017 04:20 AM    Calcium 7.8 12/22/2017 04:20 AM    Bilirubin, total 0.7 12/22/2017 04:20 AM    AST (SGOT) 62 12/22/2017 04:20 AM    Alk.  phosphatase 93 12/22/2017 04:20 AM    Protein, total 6.4 12/22/2017 04:20 AM    Albumin 1.6 12/22/2017 04:20 AM    Globulin 4.8 12/22/2017 04:20 AM    A-G Ratio 0.3 12/22/2017 04:20 AM    ALT (SGPT) 26 12/22/2017 04:20 AM     Lab Results   Component Value Date/Time     12/21/2017 02:27 PM    Troponin-I, Qt. <0.04 12/15/2017 03:17 PM     Lab Results   Component Value Date/Time     12/21/2017 02:27 PM         ASSESSMENT  Hospital Problems  Date Reviewed: 12/15/2017          Codes Class Noted POA    Metabolic encephalopathy JBE-03-OG: G93.41  ICD-9-CM: 348.31  12/21/2017 Unknown        * (Principal)Weakness ICD-10-CM: R53.1  ICD-9-CM: 780.79  12/15/2017 Yes        Parkinson disease ([facility] [address] (Chronic) ICD-10-CM: G20  ICD-9-CM: 332.0  11/7/2016 Yes               PLAN  Advanced PD with deconditioning and superimposed metabolic encephalopathy related to infection  Maintain sinemet 25/100 mg, 1.5 tabs tid.  He has not responded well to sinemet as expected  Continue treatment of underlying issues and unclear how much he will improve neurologically  Agree that palliative care and hospice may be reasonable given all his co morbidities  Please call with questions   Dr. Rosalio Reagan available this weekend    Rodriguez Salmon MD

## 2017-12-22 NOTE — PROGRESS NOTES
Reviewed chart and discussed case with nsg. Patient with no change in function with continued decreased management of secretions. Bites on suction wand or oral swabs with no appropriate responses to oral care. Remains inappropriate for PO trials based on secretion management and mental status. Also scheduled for debridement of wound today. SLP will follow up next week for re-assessment of swallow function as secretion management allows. Continue to recommend discussions regarding PEG vs comfort care as patient does not appear to be making progress despite reintroduction of Parkinson's meds. Ari Callahan M.CD.  CCC-SLP

## 2017-12-23 LAB
ALBUMIN SERPL-MCNC: 1.4 G/DL (ref 3.5–5)
ALBUMIN/GLOB SERPL: 0.3 {RATIO} (ref 1.1–2.2)
ALP SERPL-CCNC: 86 U/L (ref 45–117)
ALT SERPL-CCNC: 11 U/L (ref 12–78)
ANION GAP SERPL CALC-SCNC: 10 MMOL/L (ref 5–15)
AST SERPL-CCNC: 62 U/L (ref 15–37)
BACTERIA SPEC CULT: ABNORMAL
BACTERIA SPEC CULT: ABNORMAL
BASOPHILS # BLD: 0 K/UL (ref 0–0.1)
BASOPHILS NFR BLD: 0 % (ref 0–1)
BILIRUB SERPL-MCNC: 0.7 MG/DL (ref 0.2–1)
BUN SERPL-MCNC: 13 MG/DL (ref 6–20)
BUN/CREAT SERPL: 17 (ref 12–20)
CALCIUM SERPL-MCNC: 7.9 MG/DL (ref 8.5–10.1)
CC UR VC: ABNORMAL
CHLORIDE SERPL-SCNC: 104 MMOL/L (ref 97–108)
CO2 SERPL-SCNC: 25 MMOL/L (ref 21–32)
CREAT SERPL-MCNC: 0.76 MG/DL (ref 0.7–1.3)
DIFFERENTIAL METHOD BLD: ABNORMAL
EOSINOPHIL # BLD: 0 K/UL (ref 0–0.4)
EOSINOPHIL NFR BLD: 0 % (ref 0–7)
ERYTHROCYTE [DISTWIDTH] IN BLOOD BY AUTOMATED COUNT: 14.3 % (ref 11.5–14.5)
GLOBULIN SER CALC-MCNC: 4.4 G/DL (ref 2–4)
GLUCOSE SERPL-MCNC: 92 MG/DL (ref 65–100)
HCT VFR BLD AUTO: 25.7 % (ref 36.6–50.3)
HGB BLD-MCNC: 8.5 G/DL (ref 12.1–17)
LYMPHOCYTES # BLD: 0.2 K/UL (ref 0.8–3.5)
LYMPHOCYTES NFR BLD: 2 % (ref 12–49)
MAGNESIUM SERPL-MCNC: 2.4 MG/DL (ref 1.6–2.4)
MCH RBC QN AUTO: 29.9 PG (ref 26–34)
MCHC RBC AUTO-ENTMCNC: 33.1 G/DL (ref 30–36.5)
MCV RBC AUTO: 90.5 FL (ref 80–99)
MONOCYTES # BLD: 0.2 K/UL (ref 0–1)
MONOCYTES NFR BLD: 2 % (ref 5–13)
NEUTS BAND NFR BLD MANUAL: 7 % (ref 0–6)
NEUTS SEG # BLD: 10.3 K/UL (ref 1.8–8)
NEUTS SEG NFR BLD: 89 % (ref 32–75)
PHOSPHATE SERPL-MCNC: 2.2 MG/DL (ref 2.6–4.7)
PLATELET # BLD AUTO: 252 K/UL (ref 150–400)
PLATELET COMMENTS,PCOM: ABNORMAL
POTASSIUM SERPL-SCNC: 4 MMOL/L (ref 3.5–5.1)
PROT SERPL-MCNC: 5.8 G/DL (ref 6.4–8.2)
RBC # BLD AUTO: 2.84 M/UL (ref 4.1–5.7)
RBC MORPH BLD: ABNORMAL
SERVICE CMNT-IMP: ABNORMAL
SODIUM SERPL-SCNC: 139 MMOL/L (ref 136–145)
WBC # BLD AUTO: 10.7 K/UL (ref 4.1–11.1)

## 2017-12-23 PROCEDURE — 74011250637 HC RX REV CODE- 250/637: Performed by: HOSPITALIST

## 2017-12-23 PROCEDURE — 74011250637 HC RX REV CODE- 250/637: Performed by: FAMILY MEDICINE

## 2017-12-23 PROCEDURE — 74011250637 HC RX REV CODE- 250/637: Performed by: PSYCHIATRY & NEUROLOGY

## 2017-12-23 PROCEDURE — 65660000000 HC RM CCU STEPDOWN

## 2017-12-23 PROCEDURE — 36415 COLL VENOUS BLD VENIPUNCTURE: CPT | Performed by: SURGERY

## 2017-12-23 PROCEDURE — 77010033678 HC OXYGEN DAILY

## 2017-12-23 PROCEDURE — 84100 ASSAY OF PHOSPHORUS: CPT | Performed by: SURGERY

## 2017-12-23 PROCEDURE — 74011000258 HC RX REV CODE- 258: Performed by: INTERNAL MEDICINE

## 2017-12-23 PROCEDURE — 83735 ASSAY OF MAGNESIUM: CPT | Performed by: SURGERY

## 2017-12-23 PROCEDURE — 74011250636 HC RX REV CODE- 250/636: Performed by: HOSPITALIST

## 2017-12-23 PROCEDURE — 74011000250 HC RX REV CODE- 250: Performed by: SURGERY

## 2017-12-23 PROCEDURE — 85027 COMPLETE CBC AUTOMATED: CPT | Performed by: SURGERY

## 2017-12-23 PROCEDURE — 80053 COMPREHEN METABOLIC PANEL: CPT | Performed by: SURGERY

## 2017-12-23 RX ORDER — DEXTROSE MONOHYDRATE AND SODIUM CHLORIDE 5; .45 G/100ML; G/100ML
100 INJECTION, SOLUTION INTRAVENOUS CONTINUOUS
Status: DISCONTINUED | OUTPATIENT
Start: 2017-12-23 | End: 2017-12-24

## 2017-12-23 RX ADMIN — DEXTROSE MONOHYDRATE AND SODIUM CHLORIDE 100 ML/HR: 5; .45 INJECTION, SOLUTION INTRAVENOUS at 18:04

## 2017-12-23 RX ADMIN — DAKIN'S SOLUTION 0.125% (QUARTER STRENGTH): 0.12 SOLUTION at 12:50

## 2017-12-23 RX ADMIN — DAKIN'S SOLUTION 0.125% (QUARTER STRENGTH): 0.12 SOLUTION at 22:26

## 2017-12-23 RX ADMIN — Medication 10 ML: at 05:29

## 2017-12-23 RX ADMIN — LACTULOSE 20 G: 20 SOLUTION ORAL at 08:39

## 2017-12-23 RX ADMIN — CARBIDOPA AND LEVODOPA 1.5 TABLET: 25; 100 TABLET ORAL at 08:39

## 2017-12-23 RX ADMIN — LATANOPROST 1 DROP: 50 SOLUTION OPHTHALMIC at 22:26

## 2017-12-23 RX ADMIN — PANTOPRAZOLE SODIUM 20 MG: 40 TABLET, DELAYED RELEASE ORAL at 06:41

## 2017-12-23 RX ADMIN — Medication 100 MG: at 08:39

## 2017-12-23 RX ADMIN — Medication 10 ML: at 14:42

## 2017-12-23 RX ADMIN — CARBIDOPA AND LEVODOPA 1.5 TABLET: 25; 100 TABLET ORAL at 12:50

## 2017-12-23 RX ADMIN — LEVOFLOXACIN 750 MG: 5 INJECTION, SOLUTION INTRAVENOUS at 08:39

## 2017-12-23 RX ADMIN — DAKIN'S SOLUTION 0.125% (QUARTER STRENGTH): 0.12 SOLUTION at 05:28

## 2017-12-23 NOTE — CONSULTS
Palliative Medicine Consult  Gianni: [phone] ([phone])    Patient Name: Warren Blank  YOB: 1940    Date of Initial Consult: December 18, 2017  Reason for Consult: Care Decisions  Requesting Provider: Dr. Edy Romano  Primary Care Physician: Cornell Knutson MD     SUMMARY:   Warren Blank is a 68 y.o. with a past history of CAD, Parkinson's Disease, hypercholesterolemia, osteoporosis, prostate cancer s/p prostatectomy who was admitted on 12/15/2017 from home with a diagnosis of hypernatremia, increasing weakness, unintentional wt loss. Admission complicated by left lower lobe lung mass concerning for neoplasia but CT images reveal more of a rounded pneumonia per pulmonary notes. Dysphagia also noted. Pt scheduled for NG tube placed by IR 12/18/17. Current medical issues leading to Palliative Medicine involvement include: care goals. Social: , 2 children, 1 grand child,     Interval History: wound debridement 12/22/17   PALLIATIVE DIAGNOSES:   1. Dysphagia  2. Cachexia  3. Rigidity   4. Dysphonia   5. Physical debility  6. Parkinson's Disease  7. Pressure Ulcer  8. Bed bound status     PLAN:   1. Met with the patient's wife for follow up today  2. Wife provided a copy of the patient's AMD for scanning  3. We also discussed considering hospice once the pt is released from the hospital.  Wife says she had the pt evaluated in the past and he failed to meet criteria  4. Assured the wife that he would qualify at this time due to change in condition  5. Wife very receptive. Wife is hopeful that once the Parkinson's meds kick in the pt will be able to tolerate a diet and will not need the feeding tube. Plan remains to place the tube if he does not improve. 6. Wife relieved the pt tolerated the debridement today  7. We agreed to discuss further next week    8.  Goals: start parkinson's meds,continue current level of care, full code status, feeding tube if indicated, return home possibly with hospice    9. Initial consult note routed to primary continuity provider  10. Communicated plan of care with: Palliative IDT       GOALS OF CARE / TREATMENT PREFERENCES:     GOALS OF CARE: Continue current level of care. Full Code status. Return home upon dc. Patient/Health Care Proxy Stated Goals: Prolong life      TREATMENT PREFERENCES:   Code Status: Full Code    Advance Care Planning:  Advance Care Planning 12/20/2017   Patient's Healthcare Decision Maker is: Legal Next of Alex Bryant   Primary Decision Maker Name Mrs. Villa Fitting Decision Maker Phone Number [phone]   Primary Decision Maker Relationship to Patient Spouse   Confirm Advance Directive None   Patient Would Like to Complete Advance Directive Unable       Medical Interventions: Full interventions   Other Instructions:   Artificially Administered Nutrition: Feeding tube long-term, if indicated     Other:    As far as possible, the palliative care team has discussed with patient / health care proxy about goals of care / treatment preferences for patient.      HISTORY:     History obtained from: chart    CHIEF COMPLAINT: nonverbal    HPI/SUBJECTIVE:    The patient is:   [] Verbal and participatory  [x] Non-participatory due to:   Medical condition    Clinical Pain Assessment (nonverbal scale for severity on nonverbal patients):   Clinical Pain Assessment  Severity: 0     Activity (Movement): Lying quietly, normal position    Duration: for how long has pt been experiencing pain (e.g., 2 days, 1 month, years)  Frequency: how often pain is an issue (e.g., several times per day, once every few days, constant)     FUNCTIONAL ASSESSMENT:     Palliative Performance Scale (PPS):  PPS: 30       PSYCHOSOCIAL/SPIRITUAL SCREENING:     Palliative IDT has assessed this patient for cultural preferences / practices and a referral made as appropriate to needs (Cultural Services, Patient Advocacy, Ethics, etc.)    Advance Care Planning:  Advance Care Planning 12/20/2017   Patient's Healthcare Decision Maker is: Legal Next of Alex Bryant   Primary Decision Maker Name Mrs. Venancio Vargas Decision Maker Phone Number [phone]   Primary Decision Maker Relationship to Patient Spouse   Confirm Advance Directive None   Patient Would Like to Complete Advance Directive Unable       Any spiritual / Mandaeism concerns:  [] Yes /  [x] No    Caregiver Burnout:  [] Yes /  [x] No /  [] No Caregiver Present      Anticipatory grief assessment:   [x] Normal  / [] Maladaptive       ESAS Anxiety: Anxiety: 0    ESAS Depression:          REVIEW OF SYSTEMS:     Positive and pertinent negative findings in ROS are noted above in HPI. The following systems were [x] reviewed objectively / [] unable to be reviewed as noted in HPI  Other findings are noted below. Systems: constitutional, ears/nose/mouth/throat, respiratory, gastrointestinal, genitourinary, musculoskeletal, integumentary, neurologic, psychiatric, endocrine. Positive findings noted below. Modified ESAS Completed by: provider   Fatigue: 10 Drowsiness: 10     Pain: 0   Anxiety: 0 Nausea: 0   Anorexia: 10 Dyspnea: 0           Stool Occurrence(s): 1        PHYSICAL EXAM:     From RN flowsheet:  Wt Readings from Last 3 Encounters:   12/22/17 122 lb 2.2 oz (55.4 kg)   04/01/17 123 lb (55.8 kg)   11/11/16 118 lb (53.5 kg)     Blood pressure 113/42, pulse 71, temperature 98 °F (36.7 °C), resp. rate 12, height 6' 1\" (1.854 m), weight 122 lb 2.2 oz (55.4 kg), SpO2 96 %.     Pain Scale 1: Adult Nonverbal Pain Scale  Pain Intensity 1: 0     Pain Location 1: Coccyx, Foot, Knee  Pain Orientation 1: Anterior, Posterior  Pain Description 1: Aching, Sharp  Pain Intervention(s) 1: Medication (see MAR)  Last bowel movement, if known:     Constitutional: frail, cachectic, nad  Eyes: pupils equal, anicteric  ENMT: no nasal discharge, moist mucous membranes, +NGT  Cardiovascular: regular rhythm, distal pulses intact  Respiratory: breathing not labored, symmetric  Gastrointestinal: soft non-tender, +bowel sounds  Musculoskeletal: no deformity, no tenderness to palpation  Skin: warm, dry  Neurologic: following no commands, Parkinson's disease  Psychiatric:   Other:       HISTORY:     Principal Problem:    Weakness (12/15/2017)    Active Problems:    Parkinson disease ([facility] (06/7/1432)      Metabolic encephalopathy (02/94/4622)      Past Medical History:   Diagnosis Date    CAD (coronary artery disease)     Hypercholesteremia     Osteoporosis     Parkinson disease ([facility]       Past Surgical History:   Procedure Laterality Date    CARDIAC SURG PROCEDURE UNLIST      bipass x 2    HX PROSTATECTOMY        History reviewed. No pertinent family history. History reviewed, no pertinent family history.   Social History   Substance Use Topics    Smoking status: Former Smoker    Smokeless tobacco: Not on file    Alcohol use No     No Known Allergies   Current Facility-Administered Medications   Medication Dose Route Frequency    sodium hypochlorite (QUARTER STRENGTH DAKIN'S) 0.125% irrigation (bottle)   Topical TID    levoFLOXacin (LEVAQUIN) 750 mg in D5W IVPB  750 mg IntraVENous Q24H    carbidopa-levodopa (SINEMET)  mg per tablet 1.5 Tab  1.5 Tab Oral QID    pantoprazole (PROTONIX) 2 mg/mL oral suspension 20 mg  20 mg Oral ACB    lidocaine (XYLOCAINE) 2 % jelly   Mucous Membrane PRN    thiamine (B-1) tablet 100 mg  100 mg Per NG tube DAILY    pimavanserin tab 34 mg (Patient Supplied)  34 mg Per NG tube DAILY    lactulose (CHRONULAC) solution 20 g  20 g Per NG tube BID    morphine injection 2 mg  2 mg IntraVENous Q4H PRN    aspirin chewable tablet 81 mg  81 mg Oral QHS    polyethylene glycol (MIRALAX) packet 17 g  17 g Oral QHS    latanoprost (XALATAN) 0.005 % ophthalmic solution 1 Drop  1 Drop Both Eyes QHS    sodium chloride (NS) flush 5-10 mL  5-10 mL IntraVENous Q8H    sodium chloride (NS) flush 5-10 mL  5-10 mL IntraVENous PRN    acetaminophen (TYLENOL) tablet 650 mg  650 mg Oral Q4H PRN    Or    acetaminophen (TYLENOL) solution 650 mg  650 mg Per NG tube Q4H PRN    Or    acetaminophen (TYLENOL) suppository 650 mg  650 mg Rectal Q4H PRN    rivastigmine (EXELON) 9.5 mg/24 hr patch 1 Patch  1 Patch TransDERmal DAILY          LAB AND IMAGING FINDINGS:     Lab Results   Component Value Date/Time    WBC 14.4 12/22/2017 04:20 AM    HGB 9.8 12/22/2017 04:20 AM    PLATELET 721 02/81/8410 04:20 AM     Lab Results   Component Value Date/Time    Sodium 137 12/22/2017 04:20 AM    Potassium 4.1 12/22/2017 04:20 AM    Chloride 103 12/22/2017 04:20 AM    CO2 28 12/22/2017 04:20 AM    BUN 12 12/22/2017 04:20 AM    Creatinine 0.82 12/22/2017 04:20 AM    Calcium 7.8 12/22/2017 04:20 AM    Magnesium 2.2 12/22/2017 04:20 AM    Phosphorus 2.3 12/22/2017 04:20 AM      Lab Results   Component Value Date/Time    AST (SGOT) 62 12/22/2017 04:20 AM    Alk. phosphatase 93 12/22/2017 04:20 AM    Protein, total 6.4 12/22/2017 04:20 AM    Albumin 1.6 12/22/2017 04:20 AM    Globulin 4.8 12/22/2017 04:20 AM     Lab Results   Component Value Date/Time    INR 1.1 07/12/2015 12:15 PM    Prothrombin time 10.8 07/12/2015 12:15 PM    aPTT 27.0 07/12/2015 12:15 PM      No results found for: IRON, FE, TIBC, IBCT, PSAT, FERR   No results found for: PH, PCO2, PO2  No components found for: [place]   Lab Results   Component Value Date/Time     12/21/2017 02:27 PM                Total time: 25 minutes  Counseling / coordination time, spent as noted above: 20 minutes  > 50% counseling / coordination?: y    Prolonged service was provided for  []30 min   []75 min in face to face time in the presence of the patient, spent as noted above. Time Start:   Time End:   Note: this can only be billed with 17421 (initial) or 10256 (follow up). If multiple start / stop times, list each separately.

## 2017-12-23 NOTE — OP NOTES
[facility] OP NOTE    Anton De La O  MR#: [id number]  : 1940  ACCOUNT #: [de-identified]   DATE OF SERVICE: 2017    SURGEON:  Lion Bolton MD    PREOPERATIVE DIAGNOSIS:  Pressure necrosis of the sacral region. POSTOPERATIVE DIAGNOSIS:  Pressure necrosis of the sacral region. PROCEDURE PERFORMED:  Debridement of 12 x 15 cm area of pressure necrosis overlying the sacrum. ANESTHESIA:  General.    FINDINGS:  That of a 12 x 15 cm area of actually dead skin. After removing that, the area of underlying necrotic muscle was debrided using mostly electrocautery. The entire field was about 13 x 16 cm in size. ESTIMATED BLOOD LOSS:  Minimal.    COMPLICATIONS:  None. SPECIMENS REMOVED:  None. CONDITION:  Good to the PACU. DRAINS:  None. The patient was placed under suitable general anesthesia and then placed prone on the operating room table and secured and padded in the appropriate places. The area of the sacrum was washed with Betadine. The 12 x 15 cm area of skin was excised using electrocautery. The underlying dead muscle and necrotic fat was debrided using mostly electrocautery. I tried to get back to bleeding healthy tissue as far as I could. Any bleeding site was coagulated with the coag cautery. Eventually got it back to a fairly good area and then packed it loosely with Kerlix soaked in Dakin's solution. This was covered with several ABD pads and secured with tape. At termination of the procedure, all counts were correct. The patient tolerated this well, was brought back to the PACU in satisfactory condition. MD Alejandra Judge / [place]  D: 2017 11:54     T: 2017 17:11  JOB #: [id number]  CC: Nya Ramirez MD  CC: Codie Garsia MD  CC: Kwesi Hillman NP  CC: Bret Hussein MD  CC: Sarai Kelley MD

## 2017-12-23 NOTE — PROGRESS NOTES
General Surgery Daily Progress Note    Admit Date: 12/15/2017  Post-Operative Day: 1 Day Post-Op from Procedure(s):  DEBRIDEMENT OF SACRAL ULCER  REQ 1000     Subjective:     Last 24 hrs: Eyes open to voice. Currently sitting in stool. Objective:     Blood pressure 96/48, pulse 70, temperature 97.8 °F (36.6 °C), resp. rate 21, height 6' 1\" (1.854 m), weight 122 lb 12.7 oz (55.7 kg), SpO2 100 %. Temp (24hrs), Av.2 °F (36.8 °C), Min:97.6 °F (36.4 °C), Max:99.5 °F (37.5 °C)      _____________________  Physical Exam:     Eyes open to voice  Cardiovascular: RRR, no peripheral edema  Sacrum not examined d/t pt in stool      Assessment:   Principal Problem:    Weakness (12/15/2017)    Active Problems:    [facility] disease ([facility] ()      Metabolic encephalopathy ()            Plan:     Cont LWC  Maybe vac in future if heals appropriately    Data Review:    Recent Labs      17   0253  17   0420   WBC  10.7  14.4*   HGB  8.5*  9.8*   HCT  25.7*  29.5*   PLT  252  254     Recent Labs      17   0253  17   0420  17   1219  17   0505   NA  139  137  139   --    K  4.0  4.1   --    --    CL  104  103   --    --    CO2  25  28   --    --    GLU  92  94   --    --    BUN  13  12   --    --    CREA  0.76  0.82   --    --    CA  7.9*  7.8*   --    --    MG  2.4  2.2   --    --    PHOS  2.2*  2.3*   --   2.7   ALB  1.4*  1.6*   --    --    SGOT  62*  62*   --    --    ALT  11*  26   --    --      No results for input(s): AML, LPSE in the last 72 hours.         ______________________  Medications:    Current Facility-Administered Medications   Medication Dose Route Frequency    sodium hypochlorite (QUARTER STRENGTH DAKIN'S) 0.125% irrigation (bottle)   Topical TID    levoFLOXacin (LEVAQUIN) 750 mg in D5W IVPB  750 mg IntraVENous Q24H    carbidopa-levodopa (SINEMET)  mg per tablet 1.5 Tab  1.5 Tab Oral QID    pantoprazole (PROTONIX) 2 mg/mL oral suspension 20 mg 20 mg Oral ACB    lidocaine (XYLOCAINE) 2 % jelly   Mucous Membrane PRN    thiamine (B-1) tablet 100 mg  100 mg Per NG tube DAILY    pimavanserin tab 34 mg (Patient Supplied)  34 mg Per NG tube DAILY    lactulose (CHRONULAC) solution 20 g  20 g Per NG tube BID    morphine injection 2 mg  2 mg IntraVENous Q4H PRN    aspirin chewable tablet 81 mg  81 mg Oral QHS    polyethylene glycol (MIRALAX) packet 17 g  17 g Oral QHS    latanoprost (XALATAN) 0.005 % ophthalmic solution 1 Drop  1 Drop Both Eyes QHS    sodium chloride (NS) flush 5-10 mL  5-10 mL IntraVENous Q8H    sodium chloride (NS) flush 5-10 mL  5-10 mL IntraVENous PRN    acetaminophen (TYLENOL) tablet 650 mg  650 mg Oral Q4H PRN    Or    acetaminophen (TYLENOL) solution 650 mg  650 mg Per NG tube Q4H PRN    Or    acetaminophen (TYLENOL) suppository 650 mg  650 mg Rectal Q4H PRN    rivastigmine (EXELON) 9.5 mg/24 hr patch 1 Patch  1 Patch TransDERmal DAILY       Sandra Ortiz, NP  12/23/2017

## 2017-12-23 NOTE — PROGRESS NOTES
Problem: Falls - Risk of  Goal: *Absence of Falls  Document Ora Fall Risk and appropriate interventions in the flowsheet.    Outcome: Progressing Towards Goal  Fall Risk Interventions:  Mobility Interventions: Bed/chair exit alarm, PT Consult for assist device competence, Strengthening exercises (ROM-active/passive), PT Consult for mobility concerns    Mentation Interventions: Adequate sleep, hydration, pain control, Bed/chair exit alarm, Door open when patient unattended, Evaluate medications/consider consulting pharmacy, Increase mobility, More frequent rounding, Reorient patient, Toileting rounds, Update white board    Medication Interventions: Bed/chair exit alarm, Evaluate medications/consider consulting pharmacy    Elimination Interventions: Bed/chair exit alarm, Call light in reach

## 2017-12-23 NOTE — PROGRESS NOTES
Hospitalist Progress Note  Binh Bravo MD  Answering service: [phone] OR [phone] from in house phone      Date of Service:  2017  NAME:  iDana Patrick  :  1940  MRN:  [id number]      Admission Summary:     Patient admitted for weight loss and failure to thrive    Interval history / Subjective:       Patient seen,He is on NG tube and appears not to be in distress. More awake today. 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 Neurology consulted - continue asa  Speech eval - Recommends NPO for now  NG tube for medications   Dietary consult - started on tube feeding ()  Aspiration Precautions    Paraoxysmal Afib  Now in NSR  New onset ()  Received one dose dig  On [facility]   Cardiology Consulted  Echo showing EF of 65%    Unintentional Weight loss  CT chest showing possible lung nodule/mass - consulted pulmonary  Lung biopsy - Ct guided on , however Evaluated by radiology and doubt this is a true lung tumor and recommends follow up ct in 6 months  Pulmonary following and defer investigations    Failure to thrive and dysphagia with Severe protein calorie nutrition  NG for feeding and medication admistration   Palliative consulted to discuss with family regarding goals of care if family wishes to proceed with peg tube - for now the wife would like to hold off for now  Family wants to give a chance for parkinson medications to work before deciding on peg    UTI  On levaquin since   Ucx c/w GNR,identification pending.   Continue levaquin for now,will adjust if necessary once identification known  Blood cx NGTD   WBC 10.7,afebrile today        Sacral ulcer - unstageable on admission  Sacral, Pressure Injury Unstageable Present on Admission:  7.5 x 5.2 x 0.4 cm 100% soft eschar with 10% pink noted around perimeter. Periwound hyperpigmented and non blanching; malodor noted; tender to touch; small serous exudate. Wound care consulted  Surgery consulted. Sacral ulcer debridement was done 12/22. Metabolic encephalopathy from Hypernatremia on admission   Although hypernatremia resolved,mentation not much improved likely due to multiple medical conditions such as advanced Parkinson's disease,dementia,infection,brain infarct. MRI brain showing old pontine infarct  Continue to monitor mentation,doubt will much improve      Hypernatremia  Resolved  Could be related to reset osmostat in mineralocorticoid use. poor po intake  Hold Florinef  Dextrose IVF  Appreciate renal input    Code status: Full  DVT prophylaxis: SCD    Care Plan discussed with: Patient/Family  Disposition: TBD     12/16 - d/w patients wife and updated on plan, She is concerned about his Parkinson medication, I have called pharmacy and verified its not here. RN will call ED to see if the medication is there, she also reports that he is not on sinemet anymore as he had intolerance to it.   12/17 - updated wife at bedside on plan of care  12/18 - NG placed by nursing however placement was not confirmed, removed  12/19 - Ng placed by IR - Feeding orders given   12/20-  Went into afib new onset. Rate controlled. palliative discussed with patients wife and she wants to leave as full code and all measures done. 12/21 - NSR on monitor. Plan of care discussed with aptients family and they would want full code. 12/22 - Wife in the room with sister and daughter. Plan of care discussed.      Hospital Problems  Date Reviewed: 12/15/2017          Codes Class Noted POA    Metabolic encephalopathy HTD-73-NT: G93.41  ICD-9-CM: 348.31  12/21/2017 Unknown        * (Principal)Weakness ICD-10-CM: R53.1  ICD-9-CM: 780.79  12/15/2017 Yes        Parkinson disease ([facility] [address] (Chronic) ICD-10-CM: G20  ICD-9-CM: 332.0  11/7/2016 Yes                Review of Systems:   Review of systems not obtained due to patient factors. Vital Signs:    Last 24hrs VS reviewed since prior progress note. Most recent are:  Visit Vitals    BP 96/48 (BP 1 Location: Left arm, BP Patient Position: At rest)    Pulse 70    Temp 97.8 °F (36.6 °C)    Resp 21    Ht 6' 1\" (1.854 m)    Wt 55.7 kg (122 lb 12.7 oz)    SpO2 100%    BMI 16.2 kg/m2         Intake/Output Summary (Last 24 hours) at 12/23/17 1219  Last data filed at 12/23/17 0800   Gross per 24 hour   Intake             1500 ml   Output              310 ml   Net             1190 ml        Physical Examination:         Constitutional:  No acute distress,eyes open,turning when called by name, NG in place   ENT:  Oral mucous Dry, oropharynx benign. Neck supple,    Resp:  Decreased at bases. No accessory muscle use   CV:  Regular rhythm, normal rate, no murmurs, gallops, rubs    GI:  Soft, non distended, non tender.  normoactive bowel sounds, no hepatosplenomegaly     Musculoskeletal:  No edema, warm, 2+ pulses throughout    Neurologic:  Sleepy            Data Review:    Review and/or order of clinical lab test  Review and/or order of tests in the radiology section of CPT  Decision to obtain old records and/or obtain history from someone other than the patient      Labs:     Recent Labs      12/23/17 0253 12/22/17 0420   WBC  10.7  14.4*   HGB  8.5*  9.8*   HCT  25.7*  29.5*   PLT  252  254     Recent Labs      12/23/17 0253 12/22/17   0420  12/21/17   1219  12/21/17   0505   NA  139  137  139   --    K  4.0  4.1   --    --    CL  104  103   --    --    CO2  25  28   --    --    BUN  13  12   --    --    CREA  0.76  0.82   --    --    GLU  92  94   --    --    CA  7.9*  7.8*   --    --    MG  2.4  2.2   --    --    PHOS  2.2*  2.3*   --   2.7     Recent Labs      12/23/17 0253 12/22/17   0420   SGOT  62*  62*   ALT  11*  26   AP  86  93   TBILI  0.7  0.7   TP  5.8*  6.4   ALB  1.4*  1.6*   GLOB  4.4*  4.8*     No results for input(s): INR, PTP, APTT in the last 72 hours. No lab exists for component: INREXT, INREXT   No results for input(s): FE, TIBC, PSAT, FERR in the last 72 hours. No results found for: FOL, RBCF   No results for input(s): PH, PCO2, PO2 in the last 72 hours.   Recent Labs      12/21/17   1427   CPK  848*     Lab Results   Component Value Date/Time    Cholesterol, total 106 12/16/2017 05:15 AM    HDL Cholesterol 26 12/16/2017 05:15 AM    LDL, calculated 58.4 12/16/2017 05:15 AM    Triglyceride 108 12/16/2017 05:15 AM    CHOL/HDL Ratio 4.1 12/16/2017 05:15 AM     Lab Results   Component Value Date/Time    Glucose (POC) 107 12/20/2017 11:58 AM    Glucose (POC) 91 11/27/2009 12:20 PM     Lab Results   Component Value Date/Time    Color YELLOW/STRAW 12/21/2017 12:00 AM    Appearance CLOUDY 12/21/2017 12:00 AM    Specific gravity 1.009 12/21/2017 12:00 AM    pH (UA) 6.0 12/21/2017 12:00 AM    Protein NEGATIVE  12/21/2017 12:00 AM    Glucose NEGATIVE  12/21/2017 12:00 AM    Ketone NEGATIVE  12/21/2017 12:00 AM    Bilirubin NEGATIVE  12/21/2017 12:00 AM    Urobilinogen 1.0 12/21/2017 12:00 AM    Nitrites NEGATIVE  12/21/2017 12:00 AM    Leukocyte Esterase MODERATE 12/21/2017 12:00 AM    Epithelial cells FEW 12/21/2017 12:00 AM    Bacteria 4+ 12/21/2017 12:00 AM    WBC 10-20 12/21/2017 12:00 AM    RBC 10-20 12/21/2017 12:00 AM     All Micro Results     Procedure Component Value Units Date/Time    CULTURE, BLOOD, PAIRED [[id number]] Collected:  12/21/17 0000    Order Status:  Completed Specimen:  Blood Updated:  12/23/17 0507     Special Requests: NO SPECIAL REQUESTS        Culture result: NO GROWTH 2 DAYS       CULTURE, URINE [[id number]]  (Abnormal) Collected:  12/21/17 0000    Order Status:  Completed Specimen:  Urine Updated:  12/22/17 1038     Special Requests: --        NO SPECIAL REQUESTS  Reflexed from [id number]       [place] Count --        >100,000  COLONIES/mL       Culture result: GRAM NEGATIVE RODS (A) URINE CULTURE HOLD SAMPLE [[id number]] Collected:  12/15/17 1826    Order Status:  Canceled Specimen:  Urine         Xr Chest Sngl V    Result Date: 12/15/2017  IMPRESSION: No acute process. Mri Brain Wo Cont    Result Date: 12/16/2017  IMPRESSION: 1. Old left mo lacunar infarct. 2. Age-related findings within the upper limits of normal. 3. No acute intracranial abnormality demonstrated. Ct Head Wo Cont    Result Date: 12/15/2017  IMPRESSION: 1. There is no acute intracranial hemorrhage. 2. Apparent area of low attenuation in the left aspect of the spines may represent an age-indeterminate infarct. Further evaluation with MRI is suggested if clinically warranted. The findings were discussed with Dr. Maria E Wakefield on 12/15/2017 at 1600 hours by Dr. Lolis Salcedo. [phone]     Ct Chest Wo Cont    Result Date: 12/16/2017  IMPRESSION: Findings concerning for pulmonary neoplasm in this clinical setting, left lower lobe masslike lesion 16 x 27 mm in size. PET CT prior to sampling as this may represent rounded atelectasis/chronic parenchymal change. Lesion is amenable to percutaneous sampling if clinically warranted. No acute intraperitoneal process is identified. Renal hypodensities are consistent with simple cyst.    Ct Abd Pelv Wo Cont    Result Date: 12/16/2017  IMPRESSION: Findings concerning for pulmonary neoplasm in this clinical setting, left lower lobe masslike lesion 16 x 27 mm in size. PET CT prior to sampling as this may represent rounded atelectasis/chronic parenchymal change. Lesion is amenable to percutaneous sampling if clinically warranted. No acute intraperitoneal process is identified. Renal hypodensities are consistent with simple cyst.    Xr Chest Port    Result Date: 12/21/2017  IMPRESSION: Small left pleural effusion. Xr Abd Port  1 V    Result Date: 12/22/2017  IMPRESSION: NG tube satisfactory position.      Xr Abd Port  1 V    Result Date: 12/18/2017  IMPRESSION: Nasogastric tube in the right lower lobe bronchus. This should be removed. Xr Abd Port  1 V    Result Date: 12/18/2017  IMPRESSION: No nasogastric tube identified. INTERPRETATION PROVIDED FOR COMPLIANCE ONLY AT NO CHARGE       ECHO  SUMMARY:  Left ventricle: Systolic function was normal. Ejection fraction was  estimated to be 60 %. No obvious wall motion abnormalities identified in  the views obtained.     Medications Reviewed:     Current Facility-Administered Medications   Medication Dose Route Frequency    sodium hypochlorite (QUARTER STRENGTH DAKIN'S) 0.125% irrigation (bottle)   Topical TID    levoFLOXacin (LEVAQUIN) 750 mg in D5W IVPB  750 mg IntraVENous Q24H    carbidopa-levodopa (SINEMET)  mg per tablet 1.5 Tab  1.5 Tab Oral QID    pantoprazole (PROTONIX) 2 mg/mL oral suspension 20 mg  20 mg Oral ACB    lidocaine (XYLOCAINE) 2 % jelly   Mucous Membrane PRN    thiamine (B-1) tablet 100 mg  100 mg Per NG tube DAILY    pimavanserin tab 34 mg (Patient Supplied)  34 mg Per NG tube DAILY    lactulose (CHRONULAC) solution 20 g  20 g Per NG tube BID    morphine injection 2 mg  2 mg IntraVENous Q4H PRN    aspirin chewable tablet 81 mg  81 mg Oral QHS    polyethylene glycol (MIRALAX) packet 17 g  17 g Oral QHS    latanoprost (XALATAN) 0.005 % ophthalmic solution 1 Drop  1 Drop Both Eyes QHS    sodium chloride (NS) flush 5-10 mL  5-10 mL IntraVENous Q8H    sodium chloride (NS) flush 5-10 mL  5-10 mL IntraVENous PRN    acetaminophen (TYLENOL) tablet 650 mg  650 mg Oral Q4H PRN    Or    acetaminophen (TYLENOL) solution 650 mg  650 mg Per NG tube Q4H PRN    Or    acetaminophen (TYLENOL) suppository 650 mg  650 mg Rectal Q4H PRN    rivastigmine (EXELON) 9.5 mg/24 hr patch 1 Patch  1 Patch TransDERmal DAILY     ______________________________________________________________________  EXPECTED LENGTH OF STAY: 6d 7h  ACTUAL LENGTH OF STAY:          8                 Juliocesar Ceja MD

## 2017-12-23 NOTE — PROGRESS NOTES
1400: NG tube had dislodged. Nurse attempted to place new NG tube; was unsuccessful and patient was in distress. Dr. Delfina Mcdowell paged and gave orders to retry NG tube placement in an hour. 1600: Nurse and charge nurse attempted NG tube placement twice with no success. Dr. Delfina Mcdowell paged and gave orders to start IV dextrose in 1/2 NS. Will hold medications and revisit NG tube placement in the morning.

## 2017-12-23 NOTE — PROGRESS NOTES
12/23/17 1100   Vitals   Temp 97.8 °F (36.6 °C)   Temp Source Axillary   Pulse (Heart Rate) 70   Heart Rate Source Monitor   Resp Rate 21   O2 Sat (%) 100 %   Level of Consciousness Alert   BP 96/48   MAP (Monitor) (!) 58   MAP (Calculated) (!) 64   BP 1 Location Left arm   BP 1 Method Automatic   BP Patient Position At rest   Cardiac Rhythm NSR   MEWS Score 3   Alarms Set and Audible Cardiac alarms   Box Number 668   Electrodes Replaced No   Patient Observation   Patient Turned Turn on right side   Oxygen Therapy   O2 Device Nasal cannula   O2 Flow Rate (L/min) 1 l/min   Pain 1   Pain Scale 1 Adult Nonverbal Pain Scale   Adult Nonverbal Pain Scale   Face 0   Activity (Movement) 0   Guarding 0   Physiology (Vital Signs) 0   Respiratory 0   Total Score 0   MEWS score: 3    Dr. Fritz Michael paged and informed of recent vitals; no new orders given at this time. Will continue to monitor.

## 2017-12-23 NOTE — PROGRESS NOTES
Bedside and Verbal shift change report given to Danny Kocher, RN (oncoming nurse) by Joie Valenzuela RN (offgoing nurse). Report included the following information SBAR, Kardex, Procedure Summary, Intake/Output, MAR, Recent Results and Cardiac Rhythm NSR.

## 2017-12-23 NOTE — PROGRESS NOTES
Bedside and Verbal shift change report given to Jhonny Manzanares RN by [employer]. Report included the following information SBAR, Kardex, Intake/Output, MAR, Recent Results and Cardiac Rhythm NSR. Suzanne Quinones

## 2017-12-24 ENCOUNTER — APPOINTMENT (OUTPATIENT)
Dept: GENERAL RADIOLOGY | Age: 77
DRG: 981 | End: 2017-12-24
Attending: HOSPITALIST
Payer: MEDICARE

## 2017-12-24 LAB
ANION GAP SERPL CALC-SCNC: 9 MMOL/L (ref 5–15)
BASOPHILS # BLD: 0 K/UL (ref 0–0.1)
BASOPHILS NFR BLD: 0 % (ref 0–1)
BUN SERPL-MCNC: 11 MG/DL (ref 6–20)
BUN/CREAT SERPL: 13 (ref 12–20)
CALCIUM SERPL-MCNC: 8.1 MG/DL (ref 8.5–10.1)
CHLORIDE SERPL-SCNC: 106 MMOL/L (ref 97–108)
CO2 SERPL-SCNC: 24 MMOL/L (ref 21–32)
CREAT SERPL-MCNC: 0.86 MG/DL (ref 0.7–1.3)
DIFFERENTIAL METHOD BLD: ABNORMAL
EOSINOPHIL # BLD: 0 K/UL (ref 0–0.4)
EOSINOPHIL NFR BLD: 0 % (ref 0–7)
ERYTHROCYTE [DISTWIDTH] IN BLOOD BY AUTOMATED COUNT: 14.6 % (ref 11.5–14.5)
GLUCOSE SERPL-MCNC: 98 MG/DL (ref 65–100)
HCT VFR BLD AUTO: 29.3 % (ref 36.6–50.3)
HGB BLD-MCNC: 10 G/DL (ref 12.1–17)
LYMPHOCYTES # BLD: 0.3 K/UL (ref 0.8–3.5)
LYMPHOCYTES NFR BLD: 3 % (ref 12–49)
MAGNESIUM SERPL-MCNC: 2.3 MG/DL (ref 1.6–2.4)
MCH RBC QN AUTO: 30.8 PG (ref 26–34)
MCHC RBC AUTO-ENTMCNC: 34.1 G/DL (ref 30–36.5)
MCV RBC AUTO: 90.2 FL (ref 80–99)
MONOCYTES # BLD: 0.2 K/UL (ref 0–1)
MONOCYTES NFR BLD: 2 % (ref 5–13)
NEUTS BAND NFR BLD MANUAL: 2 % (ref 0–6)
NEUTS SEG # BLD: 8.4 K/UL (ref 1.8–8)
NEUTS SEG NFR BLD: 93 % (ref 32–75)
PHOSPHATE SERPL-MCNC: 2.5 MG/DL (ref 2.6–4.7)
PLATELET # BLD AUTO: 336 K/UL (ref 150–400)
PLATELET COMMENTS,PCOM: ABNORMAL
POTASSIUM SERPL-SCNC: 4.3 MMOL/L (ref 3.5–5.1)
RBC # BLD AUTO: 3.25 M/UL (ref 4.1–5.7)
RBC MORPH BLD: ABNORMAL
SODIUM SERPL-SCNC: 139 MMOL/L (ref 136–145)
WBC # BLD AUTO: 8.9 K/UL (ref 4.1–11.1)

## 2017-12-24 PROCEDURE — 77030008771 HC TU NG SALEM SUMP -A

## 2017-12-24 PROCEDURE — 65660000000 HC RM CCU STEPDOWN

## 2017-12-24 PROCEDURE — 74011250637 HC RX REV CODE- 250/637: Performed by: PSYCHIATRY & NEUROLOGY

## 2017-12-24 PROCEDURE — 80048 BASIC METABOLIC PNL TOTAL CA: CPT | Performed by: INTERNAL MEDICINE

## 2017-12-24 PROCEDURE — 83735 ASSAY OF MAGNESIUM: CPT | Performed by: INTERNAL MEDICINE

## 2017-12-24 PROCEDURE — 74011250637 HC RX REV CODE- 250/637: Performed by: HOSPITALIST

## 2017-12-24 PROCEDURE — 84100 ASSAY OF PHOSPHORUS: CPT | Performed by: INTERNAL MEDICINE

## 2017-12-24 PROCEDURE — 36415 COLL VENOUS BLD VENIPUNCTURE: CPT | Performed by: INTERNAL MEDICINE

## 2017-12-24 PROCEDURE — 74000 XR ABD (KUB): CPT

## 2017-12-24 PROCEDURE — 85025 COMPLETE CBC W/AUTO DIFF WBC: CPT | Performed by: INTERNAL MEDICINE

## 2017-12-24 PROCEDURE — 74011250637 HC RX REV CODE- 250/637: Performed by: FAMILY MEDICINE

## 2017-12-24 PROCEDURE — 74011000250 HC RX REV CODE- 250: Performed by: HOSPITALIST

## 2017-12-24 PROCEDURE — 74011000258 HC RX REV CODE- 258: Performed by: HOSPITALIST

## 2017-12-24 RX ADMIN — DAKIN'S SOLUTION 0.125% (QUARTER STRENGTH): 0.12 SOLUTION at 14:42

## 2017-12-24 RX ADMIN — POLYETHYLENE GLYCOL 3350 17 G: 17 POWDER, FOR SOLUTION ORAL at 23:33

## 2017-12-24 RX ADMIN — CARBIDOPA AND LEVODOPA 1.5 TABLET: 25; 100 TABLET ORAL at 12:45

## 2017-12-24 RX ADMIN — LATANOPROST 1 DROP: 50 SOLUTION OPHTHALMIC at 23:33

## 2017-12-24 RX ADMIN — Medication 10 ML: at 07:34

## 2017-12-24 RX ADMIN — AZTREONAM 2 G: 2 INJECTION, POWDER, LYOPHILIZED, FOR SOLUTION INTRAMUSCULAR; INTRAVENOUS at 10:12

## 2017-12-24 RX ADMIN — LACTULOSE 20 G: 20 SOLUTION ORAL at 17:00

## 2017-12-24 RX ADMIN — DAKIN'S SOLUTION 0.125% (QUARTER STRENGTH): 0.12 SOLUTION at 07:34

## 2017-12-24 RX ADMIN — CARBIDOPA AND LEVODOPA 1.5 TABLET: 25; 100 TABLET ORAL at 17:00

## 2017-12-24 RX ADMIN — DAKIN'S SOLUTION 0.125% (QUARTER STRENGTH): 0.12 SOLUTION at 23:34

## 2017-12-24 RX ADMIN — Medication 100 MG: at 12:45

## 2017-12-24 RX ADMIN — AZTREONAM 2 G: 2 INJECTION, POWDER, LYOPHILIZED, FOR SOLUTION INTRAMUSCULAR; INTRAVENOUS at 16:50

## 2017-12-24 RX ADMIN — Medication 10 ML: at 23:33

## 2017-12-24 RX ADMIN — CARBIDOPA AND LEVODOPA 1.5 TABLET: 25; 100 TABLET ORAL at 23:32

## 2017-12-24 RX ADMIN — ASPIRIN 81 MG 81 MG: 81 TABLET ORAL at 23:33

## 2017-12-24 RX ADMIN — Medication 10 ML: at 14:00

## 2017-12-24 NOTE — PROGRESS NOTES
Bedside shift change report given to [facility] [place] Elpidio (oncoming nurse) by Tayla Mackenzie (offgoing nurse). Report included the following information SBAR, Kardex and Recent Results.

## 2017-12-24 NOTE — ROUTINE PROCESS
Bedside shift change report given to Kaylee Sylvester RN (oncoming nurse) by Kyra Tobias RN (offgoing nurse). Report included the following information SBAR, Kardex, ED Summary, Procedure Summary, Intake/Output, MAR, Accordion, Recent Results and Cardiac Rhythm NSR w/ occasional PVCs.

## 2017-12-24 NOTE — PROGRESS NOTES
Hospitalist Progress Note  Lorena Guerrero MD  Answering service: [phone] from in house phone      Date of Service:  2017  NAME:  Wade Flores  :  1940  MRN:  [id number]      Admission Summary:     Patient admitted for weight loss and failure to thrive    Interval history / Subjective:       Patient seen,His NG tube came out yesterday. Nurses will try to put it back today. Does not communicate. 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 Neurology consulted - continue asa  Speech eval - Recommends NPO for now  NG tube for medications   Dietary consult - started on tube feeding ()  Aspiration Precautions    Paraoxysmal Afib  Now in NSR  New onset ()  Received one dose dig  On [facility]   Cardiology Consulted  Echo showing EF of 65%    Unintentional Weight loss  CT chest showing possible lung nodule/mass - consulted pulmonary  Lung biopsy - Ct guided on , however Evaluated by radiology and doubt this is a true lung tumor and recommends follow up ct in 6 months  Pulmonary following and defer investigations    Failure to thrive and dysphagia with Severe protein calorie nutrition  NG for feeding and medication admistration   Palliative consulted to discuss with family regarding goals of care if family wishes to proceed with peg tube - for now the wife would like to hold off for now  Family wants to give a chance for parkinson medications to work before deciding on peg    UTI  On levaquin since   Ucx  Pseudomonas and E coli  Change antibiotics to Aztreonam.  WBC 8.9        Sacral ulcer - unstageable on admission  Sacral, Pressure Injury Unstageable Present on Admission:  7.5 x 5.2 x 0.4 cm 100% soft eschar with 10% pink noted around perimeter.   Periwound hyperpigmented and non blanching; malodor noted; tender to touch; small serous exudate. Wound care consulted  Surgery consulted. Sacral ulcer debridement was done 12/23. Metabolic encephalopathy from Hypernatremia on admission   Although hypernatremia resolved,mentation not much improved likely due to multiple medical conditions such as advanced Parkinson's disease,dementia,infection,brain infarct. MRI brain showing old pontine infarct  Continue to monitor mentation,doubt will much improve      Hypernatremia  Resolved  Could be related to reset osmostat in mineralocorticoid use. poor po intake  Hold Florinef  Dextrose IVF  Appreciate renal input    Code status: Full  DVT prophylaxis: SCD    Care Plan discussed with: Patient/Family  Disposition: TBD     12/16 - d/w patients wife and updated on plan, She is concerned about his Parkinson medication, I have called pharmacy and verified its not here. RN will call ED to see if the medication is there, she also reports that he is not on sinemet anymore as he had intolerance to it.   12/17 - updated wife at bedside on plan of care  12/18 - NG placed by nursing however placement was not confirmed, removed  12/19 - Ng placed by IR - Feeding orders given   12/20-  Went into afib new onset. Rate controlled. palliative discussed with patients wife and she wants to leave as full code and all measures done. 12/21 - NSR on monitor. Plan of care discussed with aptients family and they would want full code. 12/22 - Wife in the room with sister and daughter. Plan of care discussed. Hospital Problems  Date Reviewed: 12/15/2017          Codes Class Noted POA    Metabolic encephalopathy ZYZ-68-: G93.41  ICD-9-CM: 348.31  12/21/2017 Unknown        * (Principal)Weakness ICD-10-CM: R53.1  ICD-9-CM: 780.79  12/15/2017 Yes        Parkinson disease ([facility] [address] (Chronic) ICD-10-CM: G20  ICD-9-CM: 332.0  11/7/2016 Yes                Review of Systems:   Review of systems not obtained due to patient factors. Vital Signs:    Last 24hrs VS reviewed since prior progress note. Most recent are:  Visit Vitals    /57    Pulse 70    Temp 98.3 °F (36.8 °C)    Resp 17    Ht 6' 1\" (1.854 m)    Wt 58.8 kg (129 lb 10.1 oz)    SpO2 100%    BMI 17.1 kg/m2         Intake/Output Summary (Last 24 hours) at 12/24/17 0820  Last data filed at 12/23/17 1200   Gross per 24 hour   Intake              150 ml   Output                0 ml   Net              150 ml        Physical Examination:         Constitutional:  No acute distress,eyes open,turning when called by name, NG in place   ENT:  Oral mucous Dry, oropharynx benign. Neck supple,    Resp:  Decreased at bases. No accessory muscle use   CV:  Regular rhythm, normal rate, no murmurs, gallops, rubs    GI:  Soft, non distended, non tender. normoactive bowel sounds, no hepatosplenomegaly     Musculoskeletal:  No edema, warm, 2+ pulses throughout    Neurologic:  Sleepy            Data Review:    Review and/or order of clinical lab test  Review and/or order of tests in the radiology section of CPT  Decision to obtain old records and/or obtain history from someone other than the patient      Labs:     Recent Labs      12/24/17 0358 12/23/17 0253   WBC  8.9  10.7   HGB  10.0*  8.5*   HCT  29.3*  25.7*   PLT  336  252     Recent Labs      12/24/17 0358 12/23/17 0253  12/22/17   0420   NA  139  139  137   K  4.3  4.0  4.1   CL  106  104  103   CO2  24  25  28   BUN  11  13  12   CREA  0.86  0.76  0.82   GLU  98  92  94   CA  8.1*  7.9*  7.8*   MG  2.3  2.4  2.2   PHOS  2.5*  2.2*  2.3*     Recent Labs      12/23/17   0253  12/22/17   0420   SGOT  62*  62*   ALT  11*  26   AP  86  93   TBILI  0.7  0.7   TP  5.8*  6.4   ALB  1.4*  1.6*   GLOB  4.4*  4.8*     No results for input(s): INR, PTP, APTT in the last 72 hours. No lab exists for component: INREXT, INREXT   No results for input(s): FE, TIBC, PSAT, FERR in the last 72 hours.    No results found for: FOL, RBCF No results for input(s): PH, PCO2, PO2 in the last 72 hours.   Recent Labs      12/21/17   1427   CPK  848*     Lab Results   Component Value Date/Time    Cholesterol, total 106 12/16/2017 05:15 AM    HDL Cholesterol 26 12/16/2017 05:15 AM    LDL, calculated 58.4 12/16/2017 05:15 AM    Triglyceride 108 12/16/2017 05:15 AM    CHOL/HDL Ratio 4.1 12/16/2017 05:15 AM     Lab Results   Component Value Date/Time    Glucose (POC) 107 12/20/2017 11:58 AM    Glucose (POC) 91 11/27/2009 12:20 PM     Lab Results   Component Value Date/Time    Color YELLOW/STRAW 12/21/2017 12:00 AM    Appearance CLOUDY 12/21/2017 12:00 AM    Specific gravity 1.009 12/21/2017 12:00 AM    pH (UA) 6.0 12/21/2017 12:00 AM    Protein NEGATIVE  12/21/2017 12:00 AM    Glucose NEGATIVE  12/21/2017 12:00 AM    Ketone NEGATIVE  12/21/2017 12:00 AM    Bilirubin NEGATIVE  12/21/2017 12:00 AM    Urobilinogen 1.0 12/21/2017 12:00 AM    Nitrites NEGATIVE  12/21/2017 12:00 AM    Leukocyte Esterase MODERATE 12/21/2017 12:00 AM    Epithelial cells FEW 12/21/2017 12:00 AM    Bacteria 4+ 12/21/2017 12:00 AM    WBC 10-20 12/21/2017 12:00 AM    RBC 10-20 12/21/2017 12:00 AM     All Micro Results     Procedure Component Value Units Date/Time    CULTURE, BLOOD, PAIRED [[id number]] Collected:  12/21/17 0000    Order Status:  Completed Specimen:  Blood Updated:  12/24/17 0545     Special Requests: NO SPECIAL REQUESTS        Culture result: NO GROWTH 3 DAYS       CULTURE, URINE [[id number]]  (Abnormal)  (Susceptibility) Collected:  12/21/17 0000    Order Status:  Completed Specimen:  Urine Updated:  12/23/17 1332     Special Requests: --        NO SPECIAL REQUESTS  Reflexed from [id number]       H[place]reth Count --        >100,000  COLONIES/mL       Culture result:         ESCHERICHIA COLI (PREDOMINATING) (A)              PSEUDOMONAS AERUGINOSA (A)    URINE CULTURE HOLD SAMPLE [[id number]] Collected:  12/15/17 1826    Order Status:  Canceled Specimen:  Urine         Xr Chest Sngl V    Result Date: 12/15/2017  IMPRESSION: No acute process. Mri Brain Wo Cont    Result Date: 12/16/2017  IMPRESSION: 1. Old left mo lacunar infarct. 2. Age-related findings within the upper limits of normal. 3. No acute intracranial abnormality demonstrated. Ct Head Wo Cont    Result Date: 12/15/2017  IMPRESSION: 1. There is no acute intracranial hemorrhage. 2. Apparent area of low attenuation in the left aspect of the spines may represent an age-indeterminate infarct. Further evaluation with MRI is suggested if clinically warranted. The findings were discussed with Dr. Flori Ritchie on 12/15/2017 at 1600 hours by Dr. Danna Tirado. [phone]     Ct Chest Wo Cont    Result Date: 12/16/2017  IMPRESSION: Findings concerning for pulmonary neoplasm in this clinical setting, left lower lobe masslike lesion 16 x 27 mm in size. PET CT prior to sampling as this may represent rounded atelectasis/chronic parenchymal change. Lesion is amenable to percutaneous sampling if clinically warranted. No acute intraperitoneal process is identified. Renal hypodensities are consistent with simple cyst.    Ct Abd Pelv Wo Cont    Result Date: 12/16/2017  IMPRESSION: Findings concerning for pulmonary neoplasm in this clinical setting, left lower lobe masslike lesion 16 x 27 mm in size. PET CT prior to sampling as this may represent rounded atelectasis/chronic parenchymal change. Lesion is amenable to percutaneous sampling if clinically warranted. No acute intraperitoneal process is identified. Renal hypodensities are consistent with simple cyst.    Xr Chest Port    Result Date: 12/21/2017  IMPRESSION: Small left pleural effusion. Xr Abd Port  1 V    Result Date: 12/22/2017  IMPRESSION: NG tube satisfactory position. Xr Abd Port  1 V    Result Date: 12/18/2017  IMPRESSION: Nasogastric tube in the right lower lobe bronchus. This should be removed. Xr Abd Port  1 V    Result Date: 12/18/2017  IMPRESSION: No nasogastric tube identified. INTERPRETATION PROVIDED FOR COMPLIANCE ONLY AT NO CHARGE       ECHO  SUMMARY:  Left ventricle: Systolic function was normal. Ejection fraction was  estimated to be 60 %. No obvious wall motion abnormalities identified in  the views obtained.     Medications Reviewed:     Current Facility-Administered Medications   Medication Dose Route Frequency    dextrose 5 % - 0.45% NaCl infusion  100 mL/hr IntraVENous CONTINUOUS    sodium hypochlorite (QUARTER STRENGTH DAKIN'S) 0.125% irrigation (bottle)   Topical TID    levoFLOXacin (LEVAQUIN) 750 mg in D5W IVPB  750 mg IntraVENous Q24H    carbidopa-levodopa (SINEMET)  mg per tablet 1.5 Tab  1.5 Tab Oral QID    pantoprazole (PROTONIX) 2 mg/mL oral suspension 20 mg  20 mg Oral ACB    lidocaine (XYLOCAINE) 2 % jelly   Mucous Membrane PRN    thiamine (B-1) tablet 100 mg  100 mg Per NG tube DAILY    pimavanserin tab 34 mg (Patient Supplied)  34 mg Per NG tube DAILY    lactulose (CHRONULAC) solution 20 g  20 g Per NG tube BID    morphine injection 2 mg  2 mg IntraVENous Q4H PRN    aspirin chewable tablet 81 mg  81 mg Oral QHS    polyethylene glycol (MIRALAX) packet 17 g  17 g Oral QHS    latanoprost (XALATAN) 0.005 % ophthalmic solution 1 Drop  1 Drop Both Eyes QHS    sodium chloride (NS) flush 5-10 mL  5-10 mL IntraVENous Q8H    sodium chloride (NS) flush 5-10 mL  5-10 mL IntraVENous PRN    acetaminophen (TYLENOL) tablet 650 mg  650 mg Oral Q4H PRN    Or    acetaminophen (TYLENOL) solution 650 mg  650 mg Per NG tube Q4H PRN    Or    acetaminophen (TYLENOL) suppository 650 mg  650 mg Rectal Q4H PRN    rivastigmine (EXELON) 9.5 mg/24 hr patch 1 Patch  1 Patch TransDERmal DAILY     ______________________________________________________________________  EXPECTED LENGTH OF STAY: 6d 7h  ACTUAL LENGTH OF STAY:          [address], [place]

## 2017-12-25 LAB
ANION GAP SERPL CALC-SCNC: 5 MMOL/L (ref 5–15)
BUN SERPL-MCNC: 11 MG/DL (ref 6–20)
BUN/CREAT SERPL: 14 (ref 12–20)
CALCIUM SERPL-MCNC: 8 MG/DL (ref 8.5–10.1)
CHLORIDE SERPL-SCNC: 109 MMOL/L (ref 97–108)
CO2 SERPL-SCNC: 29 MMOL/L (ref 21–32)
CREAT SERPL-MCNC: 0.8 MG/DL (ref 0.7–1.3)
ERYTHROCYTE [DISTWIDTH] IN BLOOD BY AUTOMATED COUNT: 14.3 % (ref 11.5–14.5)
GLUCOSE SERPL-MCNC: 107 MG/DL (ref 65–100)
HCT VFR BLD AUTO: 25.4 % (ref 36.6–50.3)
HGB BLD-MCNC: 8.6 G/DL (ref 12.1–17)
MCH RBC QN AUTO: 30.4 PG (ref 26–34)
MCHC RBC AUTO-ENTMCNC: 33.9 G/DL (ref 30–36.5)
MCV RBC AUTO: 89.8 FL (ref 80–99)
PHOSPHATE SERPL-MCNC: 3.2 MG/DL (ref 2.6–4.7)
PLATELET # BLD AUTO: 383 K/UL (ref 150–400)
POTASSIUM SERPL-SCNC: 3.5 MMOL/L (ref 3.5–5.1)
RBC # BLD AUTO: 2.83 M/UL (ref 4.1–5.7)
SODIUM SERPL-SCNC: 143 MMOL/L (ref 136–145)
WBC # BLD AUTO: 9.8 K/UL (ref 4.1–11.1)

## 2017-12-25 PROCEDURE — 74011000258 HC RX REV CODE- 258: Performed by: HOSPITALIST

## 2017-12-25 PROCEDURE — 74011250637 HC RX REV CODE- 250/637: Performed by: FAMILY MEDICINE

## 2017-12-25 PROCEDURE — 80048 BASIC METABOLIC PNL TOTAL CA: CPT | Performed by: HOSPITALIST

## 2017-12-25 PROCEDURE — 85027 COMPLETE CBC AUTOMATED: CPT | Performed by: HOSPITALIST

## 2017-12-25 PROCEDURE — 65660000000 HC RM CCU STEPDOWN

## 2017-12-25 PROCEDURE — 74011250637 HC RX REV CODE- 250/637: Performed by: HOSPITALIST

## 2017-12-25 PROCEDURE — 36415 COLL VENOUS BLD VENIPUNCTURE: CPT | Performed by: SURGERY

## 2017-12-25 PROCEDURE — 74011250637 HC RX REV CODE- 250/637: Performed by: PSYCHIATRY & NEUROLOGY

## 2017-12-25 PROCEDURE — 74011000250 HC RX REV CODE- 250: Performed by: HOSPITALIST

## 2017-12-25 PROCEDURE — 84100 ASSAY OF PHOSPHORUS: CPT | Performed by: SURGERY

## 2017-12-25 RX ADMIN — CARBIDOPA AND LEVODOPA 1.5 TABLET: 25; 100 TABLET ORAL at 17:31

## 2017-12-25 RX ADMIN — POLYETHYLENE GLYCOL 3350 17 G: 17 POWDER, FOR SOLUTION ORAL at 21:40

## 2017-12-25 RX ADMIN — AZTREONAM 2 G: 2 INJECTION, POWDER, LYOPHILIZED, FOR SOLUTION INTRAMUSCULAR; INTRAVENOUS at 01:31

## 2017-12-25 RX ADMIN — CARBIDOPA AND LEVODOPA 1.5 TABLET: 25; 100 TABLET ORAL at 13:50

## 2017-12-25 RX ADMIN — ASPIRIN 81 MG 81 MG: 81 TABLET ORAL at 21:40

## 2017-12-25 RX ADMIN — CARBIDOPA AND LEVODOPA 1.5 TABLET: 25; 100 TABLET ORAL at 21:40

## 2017-12-25 RX ADMIN — Medication 10 ML: at 06:48

## 2017-12-25 RX ADMIN — Medication 10 ML: at 13:51

## 2017-12-25 RX ADMIN — AZTREONAM 2 G: 2 INJECTION, POWDER, LYOPHILIZED, FOR SOLUTION INTRAMUSCULAR; INTRAVENOUS at 17:31

## 2017-12-25 RX ADMIN — AZTREONAM 2 G: 2 INJECTION, POWDER, LYOPHILIZED, FOR SOLUTION INTRAMUSCULAR; INTRAVENOUS at 08:15

## 2017-12-25 RX ADMIN — ACETAMINOPHEN 650 MG: 650 SOLUTION ORAL at 13:51

## 2017-12-25 RX ADMIN — DAKIN'S SOLUTION 0.125% (QUARTER STRENGTH): 0.12 SOLUTION at 06:48

## 2017-12-25 RX ADMIN — LACTULOSE 20 G: 20 SOLUTION ORAL at 17:32

## 2017-12-25 RX ADMIN — CARBIDOPA AND LEVODOPA 1.5 TABLET: 25; 100 TABLET ORAL at 08:16

## 2017-12-25 RX ADMIN — Medication 100 MG: at 08:19

## 2017-12-25 RX ADMIN — PANTOPRAZOLE SODIUM 20 MG: 40 TABLET, DELAYED RELEASE ORAL at 06:48

## 2017-12-25 RX ADMIN — Medication 10 ML: at 21:40

## 2017-12-25 RX ADMIN — DAKIN'S SOLUTION 0.125% (QUARTER STRENGTH): 0.12 SOLUTION at 13:51

## 2017-12-25 RX ADMIN — LATANOPROST 1 DROP: 50 SOLUTION OPHTHALMIC at 21:41

## 2017-12-25 RX ADMIN — DAKIN'S SOLUTION 0.125% (QUARTER STRENGTH): 0.12 SOLUTION at 21:00

## 2017-12-25 RX ADMIN — LACTULOSE 20 G: 20 SOLUTION ORAL at 08:16

## 2017-12-25 NOTE — INTERDISCIPLINARY ROUNDS
Patient: Camilla Denis                   MRN: [id number]    Age: 68 y.o. YOB: 1940           Room/Bed: [facility]/   Admit Diagnosis: Weakness  Weakness  SACRAL ABSCESS             Principal Diagnosis: Weakness   Goals: Continue feeding, Palliative consult  Readmission: NO                  Quality Measure: Not applicable  VTE Prophylaxis: Mechanical  Influenza Vaccine screening completed? YES  Pneumococcal Vaccine screening completed? NO  Mobility needs: Yes                                                           Nutrition plan:Yes  Consults:P.T, O.T., Speech, Respiratory and Case Management                                      Financial concerns:No                                            Escalated to CM? YES  RRAT Score: 10                                                                Interventions:H2H  Testing due for pt today? YES  LOS: 9 days              Expected length of stay 10-12 days  Discharge plan: home w/ home health                                                                 PCP: Aleshia Montenegro MD  Transportation needs:  Yes                                       Days before discharge:two or more days before discharge   Discharge disposition: Home     Signed:      Michael Almanza RN  12/25/17  6609 am

## 2017-12-25 NOTE — PROGRESS NOTES
Problem: Falls - Risk of  Goal: *Absence of Falls  Document Ora Fall Risk and appropriate interventions in the flowsheet.    Outcome: Progressing Towards Goal  Fall Risk Interventions:  Mobility Interventions: Bed/chair exit alarm, OT consult for ADLs, Patient to call before getting OOB, PT Consult for mobility concerns, PT Consult for assist device competence, Strengthening exercises (ROM-active/passive)    Mentation Interventions: Bed/chair exit alarm, Evaluate medications/consider consulting pharmacy, Increase mobility, More frequent rounding, Reorient patient, Update white board    Medication Interventions: Evaluate medications/consider consulting pharmacy, Patient to call before getting OOB, Teach patient to arise slowly    Elimination Interventions: Call light in reach, Patient to call for help with toileting needs, Toileting schedule/hourly rounds

## 2017-12-25 NOTE — PROGRESS NOTES
Bedside shift change report given to Jenelle Glynn RN (oncoming nurse) by Refugio Alberto RN (offgoing nurse). Report included the following information SBAR, Kardex, MAR, Accordion and Cardiac Rhythm NSR.

## 2017-12-25 NOTE — PROGRESS NOTES
Hospitalist Progress Note  Joie Nicole MD  Answering service: [phone] from in house phone      Date of Service:  2017  NAME:  Jefry Dunn  :  1940  MRN:  [id number]      Admission Summary:     Patient admitted for weight loss and failure to thrive    Interval history / Subjective:       Patient seen family on bedside. Now has NG placed back getting tube feeds at 40 cc/hr with free water. Remains sleepy. 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 Neurology consulted - continue asa  Speech eval - Recommends NPO for now  NG tube for medications   Dietary consult - started on tube feeding ()  Aspiration Precautions    Paraoxysmal Afib  Now in NSR  New onset ()  Received one dose dig  On [facility], operated by [facility]   Cardiology Consulted  Echo showing EF of 65%    Unintentional Weight loss  CT chest showing possible lung nodule/mass - consulted pulmonary  Lung biopsy - Ct guided on , however Evaluated by radiology and doubt this is a true lung tumor and recommends follow up ct in 6 months  Pulmonary following and defer investigations    Failure to thrive and dysphagia with Severe protein calorie nutrition  NG for feeding and medication admistration   Palliative consulted to discuss with family regarding goals of care if family wishes to proceed with peg tube - for now the wife would like to hold off for now  Family wants to give a chance for parkinson medications to work before deciding on peg    UTI  On levaquin since   Ucx  Pseudomonas and E coli  Change antibiotics to Aztreonam.  WBC 9.8        Sacral ulcer - unstageable on admission  Sacral, Pressure Injury Unstageable Present on Admission:  7.5 x 5.2 x 0.4 cm 100% soft eschar with 10% pink noted around perimeter.   Periwound hyperpigmented and non blanching; malodor noted; tender to touch; small serous exudate. Wound care consulted  Surgery consulted. Sacral ulcer debridement was done 12/23. Metabolic encephalopathy from Hypernatremia on admission   Although hypernatremia resolved,mentation not much improved likely due to multiple medical conditions such as advanced Parkinson's disease,dementia,infection,brain infarct. MRI brain showing old pontine infarct  Continue to monitor mentation,doubt will much improve      Hypernatremia  Resolved  Could be related to reset osmostat in mineralocorticoid use. poor po intake  Hold Florinef  Dextrose IVF  Appreciate renal input    Code status: Full  DVT prophylaxis: SCD    Care Plan discussed with: Patient/Family  Disposition: TBD     12/16 - d/w patients wife and updated on plan, She is concerned about his Parkinson medication, I have called pharmacy and verified its not here. RN will call ED to see if the medication is there, she also reports that he is not on sinemet anymore as he had intolerance to it.   12/17 - updated wife at bedside on plan of care  12/18 - NG placed by nursing however placement was not confirmed, removed  12/19 - Ng placed by IR - Feeding orders given   12/20-  Went into afib new onset. Rate controlled. palliative discussed with patients wife and she wants to leave as full code and all measures done. 12/21 - NSR on monitor. Plan of care discussed with aptients family and they would want full code. 12/22 - Wife in the room with sister and daughter. Plan of care discussed.      Hospital Problems  Date Reviewed: 12/15/2017          Codes Class Noted POA    Metabolic encephalopathy CNK-73-ME: G93.41  ICD-9-CM: 348.31  12/21/2017 Unknown        * (Principal)Weakness ICD-10-CM: R53.1  ICD-9-CM: 780.79  12/15/2017 Yes        Parkinson disease ([facility] [address] (Chronic) ICD-10-CM: G20  ICD-9-CM: 332.0  11/7/2016 Yes                Review of Systems:   Review of systems not obtained due to patient factors. Vital Signs:    Last 24hrs VS reviewed since prior progress note. Most recent are:  Visit Vitals    BP (!) 154/102 (BP 1 Location: Left arm, BP Patient Position: At rest)    Pulse (!) 56    Temp 98.5 °F (36.9 °C)    Resp 15    Ht 6' 1\" (1.854 m)    Wt 59.3 kg (130 lb 11.7 oz)    SpO2 96%    BMI 17.25 kg/m2         Intake/Output Summary (Last 24 hours) at 12/25/17 1337  Last data filed at 12/25/17 1200   Gross per 24 hour   Intake             1450 ml   Output                0 ml   Net             1450 ml        Physical Examination:         Constitutional:  No acute distress,eyes open,turning when called by name, NG in place   ENT:  Oral mucous Dry, oropharynx benign. Neck supple,    Resp:  Decreased at bases. No accessory muscle use   CV:  Regular rhythm, normal rate, no murmurs, gallops, rubs    GI:  Soft, non distended, non tender. normoactive bowel sounds, no hepatosplenomegaly     Musculoskeletal:  No edema, warm, 2+ pulses throughout    Neurologic:  Sleepy            Data Review:    Review and/or order of clinical lab test  Review and/or order of tests in the radiology section of CPT  Decision to obtain old records and/or obtain history from someone other than the patient      Labs:     Recent Labs      12/25/17   0246  12/24/17 0358   WBC  9.8  8.9   HGB  8.6*  10.0*   HCT  25.4*  29.3*   PLT  383  336     Recent Labs      12/25/17   0252  12/25/17   0246  12/24/17   0358  12/23/17   0253   NA   --   143  139  139   K   --   3.5  4.3  4.0   CL   --   109*  106  104   CO2   --   29 24 25   BUN   --   11  11  13   CREA   --   0.80  0.86  0.76   GLU   --   107*  98  92   CA   --   8.0*  8.1*  7.9*   MG   --    --   2.3  2.4   PHOS  3.2   --   2.5*  2.2*     Recent Labs      12/23/17   0253   SGOT  62*   ALT  11*   AP  86   TBILI  0.7   TP  5.8*   ALB  1.4*   GLOB  4.4*     No results for input(s): INR, PTP, APTT in the last 72 hours.     No lab exists for component: INREXT, INREXT   No results for input(s): FE, TIBC, PSAT, FERR in the last 72 hours. No results found for: FOL, RBCF   No results for input(s): PH, PCO2, PO2 in the last 72 hours. No results for input(s): CPK, CKNDX, TROIQ in the last 72 hours.     No lab exists for component: CPKMB  Lab Results   Component Value Date/Time    Cholesterol, total 106 12/16/2017 05:15 AM    HDL Cholesterol 26 12/16/2017 05:15 AM    LDL, calculated 58.4 12/16/2017 05:15 AM    Triglyceride 108 12/16/2017 05:15 AM    CHOL/HDL Ratio 4.1 12/16/2017 05:15 AM     Lab Results   Component Value Date/Time    Glucose (POC) 107 12/20/2017 11:58 AM    Glucose (POC) 91 11/27/2009 12:20 PM     Lab Results   Component Value Date/Time    Color YELLOW/STRAW 12/21/2017 12:00 AM    Appearance CLOUDY 12/21/2017 12:00 AM    Specific gravity 1.009 12/21/2017 12:00 AM    pH (UA) 6.0 12/21/2017 12:00 AM    Protein NEGATIVE  12/21/2017 12:00 AM    Glucose NEGATIVE  12/21/2017 12:00 AM    Ketone NEGATIVE  12/21/2017 12:00 AM    Bilirubin NEGATIVE  12/21/2017 12:00 AM    Urobilinogen 1.0 12/21/2017 12:00 AM    Nitrites NEGATIVE  12/21/2017 12:00 AM    Leukocyte Esterase MODERATE 12/21/2017 12:00 AM    Epithelial cells FEW 12/21/2017 12:00 AM    Bacteria 4+ 12/21/2017 12:00 AM    WBC 10-20 12/21/2017 12:00 AM    RBC 10-20 12/21/2017 12:00 AM     All Micro Results     Procedure Component Value Units Date/Time    CULTURE, BLOOD, PAIRED [[id number]] Collected:  12/21/17 0000    Order Status:  Completed Specimen:  Blood Updated:  12/25/17 0541     Special Requests: NO SPECIAL REQUESTS        Culture result: NO GROWTH 4 DAYS       CULTURE, URINE [[id number]]  (Abnormal)  (Susceptibility) Collected:  12/21/17 0000    Order Status:  Completed Specimen:  Urine Updated:  12/23/17 1332     Special Requests: --        NO SPECIAL REQUESTS  Reflexed from [id number]       Rusk Count --        >100,000  COLONIES/mL       Culture result:         ESCHERICHIA COLI (PREDOMINATING) (A) PSEUDOMONAS AERUGINOSA (A)    URINE CULTURE HOLD SAMPLE [[id number]] Collected:  12/15/17 1826    Order Status:  Canceled Specimen:  Urine         Xr Chest Sngl V    Result Date: 12/15/2017  IMPRESSION: No acute process. Xr Abd (kub)    Result Date: 12/24/2017  IMPRESSION: NG tube satisfactory position. Mri Brain Wo Cont    Result Date: 12/16/2017  IMPRESSION: 1. Old left mo lacunar infarct. 2. Age-related findings within the upper limits of normal. 3. No acute intracranial abnormality demonstrated. Ct Head Wo Cont    Result Date: 12/15/2017  IMPRESSION: 1. There is no acute intracranial hemorrhage. 2. Apparent area of low attenuation in the left aspect of the spines may represent an age-indeterminate infarct. Further evaluation with MRI is suggested if clinically warranted. The findings were discussed with Dr. Elda Claude on 12/15/2017 at 1600 hours by Dr. Kyra Barber. [phone]     Ct Chest Wo Cont    Result Date: 12/16/2017  IMPRESSION: Findings concerning for pulmonary neoplasm in this clinical setting, left lower lobe masslike lesion 16 x 27 mm in size. PET CT prior to sampling as this may represent rounded atelectasis/chronic parenchymal change. Lesion is amenable to percutaneous sampling if clinically warranted. No acute intraperitoneal process is identified. Renal hypodensities are consistent with simple cyst.    Ct Abd Pelv Wo Cont    Result Date: 12/16/2017  IMPRESSION: Findings concerning for pulmonary neoplasm in this clinical setting, left lower lobe masslike lesion 16 x 27 mm in size. PET CT prior to sampling as this may represent rounded atelectasis/chronic parenchymal change. Lesion is amenable to percutaneous sampling if clinically warranted. No acute intraperitoneal process is identified. Renal hypodensities are consistent with simple cyst.    Xr Chest Port    Result Date: 12/21/2017  IMPRESSION: Small left pleural effusion.     Xr Abd Port  1 V    Result Date: 12/22/2017  IMPRESSION: NG tube satisfactory position. Xr Abd Port  1 V    Result Date: 12/18/2017  IMPRESSION: Nasogastric tube in the right lower lobe bronchus. This should be removed. Xr Abd Port  1 V    Result Date: 12/18/2017  IMPRESSION: No nasogastric tube identified. INTERPRETATION PROVIDED FOR COMPLIANCE ONLY AT NO CHARGE       ECHO  SUMMARY:  Left ventricle: Systolic function was normal. Ejection fraction was  estimated to be 60 %. No obvious wall motion abnormalities identified in  the views obtained.     Medications Reviewed:     Current Facility-Administered Medications   Medication Dose Route Frequency    aztreonam (AZACTAM) 2 g in 0.9% sodium chloride 100 mL IVPB  2 g IntraVENous Q8H    sodium hypochlorite (QUARTER STRENGTH DAKIN'S) 0.125% irrigation (bottle)   Topical TID    carbidopa-levodopa (SINEMET)  mg per tablet 1.5 Tab  1.5 Tab Oral QID    pantoprazole (PROTONIX) 2 mg/mL oral suspension 20 mg  20 mg Oral ACB    lidocaine (XYLOCAINE) 2 % jelly   Mucous Membrane PRN    thiamine (B-1) tablet 100 mg  100 mg Per NG tube DAILY    pimavanserin tab 34 mg (Patient Supplied)  34 mg Per NG tube DAILY    lactulose (CHRONULAC) solution 20 g  20 g Per NG tube BID    morphine injection 2 mg  2 mg IntraVENous Q4H PRN    aspirin chewable tablet 81 mg  81 mg Oral QHS    polyethylene glycol (MIRALAX) packet 17 g  17 g Oral QHS    latanoprost (XALATAN) 0.005 % ophthalmic solution 1 Drop  1 Drop Both Eyes QHS    sodium chloride (NS) flush 5-10 mL  5-10 mL IntraVENous Q8H    sodium chloride (NS) flush 5-10 mL  5-10 mL IntraVENous PRN    acetaminophen (TYLENOL) tablet 650 mg  650 mg Oral Q4H PRN    Or    acetaminophen (TYLENOL) solution 650 mg  650 mg Per NG tube Q4H PRN    Or    acetaminophen (TYLENOL) suppository 650 mg  650 mg Rectal Q4H PRN    rivastigmine (EXELON) 9.5 mg/24 hr patch 1 Patch  1 Patch TransDERmal DAILY     ______________________________________________________________________  EXPECTED LENGTH OF STAY: 6d 7h  ACTUAL LENGTH OF STAY:          [address], [place]

## 2017-12-25 NOTE — ROUTINE PROCESS
Bedside and Verbal shift change report given to [place], RN (oncoming nurse) by [facility], RN (offgoing nurse). Report included the following information SBAR, Kardex, Intake/Output, MAR, Accordion, Recent Results and Cardiac Rhythm NSR.

## 2017-12-25 NOTE — PROGRESS NOTES
Mr. Kristyn Mercado appears comfortable this PM.  Tm 99.7 HR: 63 BP: 103/42 Resp Rate: 16 98% sat on room air. Intake/Output Summary (Last 24 hours) at 12/25/17 1716  Last data filed at 12/25/17 1200   Gross per 24 hour   Intake             1240 ml   Output                0 ml   Net             1240 ml   Exam: Cor: RRR. Lungs: Bilateral breath sounds. Clear to auscultation. Abd: Soft. Non distended. Non tender. Labs: No results found for this or any previous visit (from the past 12 hour(s)). Enteric feeds as tolerated. Continue local wound care. Possible wound vac placement. Plans per Dr. Merary Otero.

## 2017-12-26 LAB
ANION GAP SERPL CALC-SCNC: 7 MMOL/L (ref 5–15)
BACTERIA SPEC CULT: NORMAL
BUN SERPL-MCNC: 11 MG/DL (ref 6–20)
BUN/CREAT SERPL: 16 (ref 12–20)
CALCIUM SERPL-MCNC: 8.5 MG/DL (ref 8.5–10.1)
CHLORIDE SERPL-SCNC: 108 MMOL/L (ref 97–108)
CO2 SERPL-SCNC: 29 MMOL/L (ref 21–32)
CREAT SERPL-MCNC: 0.69 MG/DL (ref 0.7–1.3)
ERYTHROCYTE [DISTWIDTH] IN BLOOD BY AUTOMATED COUNT: 14.7 % (ref 11.5–14.5)
GLUCOSE SERPL-MCNC: 113 MG/DL (ref 65–100)
HCT VFR BLD AUTO: 28.9 % (ref 36.6–50.3)
HGB BLD-MCNC: 9.7 G/DL (ref 12.1–17)
MCH RBC QN AUTO: 30.9 PG (ref 26–34)
MCHC RBC AUTO-ENTMCNC: 33.6 G/DL (ref 30–36.5)
MCV RBC AUTO: 92 FL (ref 80–99)
PHOSPHATE SERPL-MCNC: 2.8 MG/DL (ref 2.6–4.7)
PLATELET # BLD AUTO: 450 K/UL (ref 150–400)
POTASSIUM SERPL-SCNC: 3.7 MMOL/L (ref 3.5–5.1)
RBC # BLD AUTO: 3.14 M/UL (ref 4.1–5.7)
SERVICE CMNT-IMP: NORMAL
SODIUM SERPL-SCNC: 144 MMOL/L (ref 136–145)
WBC # BLD AUTO: 8.2 K/UL (ref 4.1–11.1)

## 2017-12-26 PROCEDURE — 74011000250 HC RX REV CODE- 250: Performed by: SURGERY

## 2017-12-26 PROCEDURE — 97530 THERAPEUTIC ACTIVITIES: CPT

## 2017-12-26 PROCEDURE — 84100 ASSAY OF PHOSPHORUS: CPT | Performed by: HOSPITALIST

## 2017-12-26 PROCEDURE — 74011250636 HC RX REV CODE- 250/636: Performed by: INTERNAL MEDICINE

## 2017-12-26 PROCEDURE — 74011250637 HC RX REV CODE- 250/637: Performed by: HOSPITALIST

## 2017-12-26 PROCEDURE — 85027 COMPLETE CBC AUTOMATED: CPT | Performed by: HOSPITALIST

## 2017-12-26 PROCEDURE — 74011250637 HC RX REV CODE- 250/637: Performed by: INTERNAL MEDICINE

## 2017-12-26 PROCEDURE — 36415 COLL VENOUS BLD VENIPUNCTURE: CPT | Performed by: HOSPITALIST

## 2017-12-26 PROCEDURE — 74011250637 HC RX REV CODE- 250/637: Performed by: FAMILY MEDICINE

## 2017-12-26 PROCEDURE — 92526 ORAL FUNCTION THERAPY: CPT | Performed by: SPEECH-LANGUAGE PATHOLOGIST

## 2017-12-26 PROCEDURE — 97110 THERAPEUTIC EXERCISES: CPT

## 2017-12-26 PROCEDURE — 65270000029 HC RM PRIVATE

## 2017-12-26 PROCEDURE — 74011250637 HC RX REV CODE- 250/637: Performed by: PSYCHIATRY & NEUROLOGY

## 2017-12-26 PROCEDURE — 80048 BASIC METABOLIC PNL TOTAL CA: CPT | Performed by: HOSPITALIST

## 2017-12-26 PROCEDURE — 74011000258 HC RX REV CODE- 258: Performed by: HOSPITALIST

## 2017-12-26 PROCEDURE — 77030018798 HC PMP KT ENTRL FED COVD -A

## 2017-12-26 PROCEDURE — 74011000250 HC RX REV CODE- 250: Performed by: HOSPITALIST

## 2017-12-26 RX ORDER — LEVOFLOXACIN 5 MG/ML
750 INJECTION, SOLUTION INTRAVENOUS EVERY 24 HOURS
Status: DISCONTINUED | OUTPATIENT
Start: 2017-12-26 | End: 2018-01-04 | Stop reason: HOSPADM

## 2017-12-26 RX ORDER — GLYCOPYRROLATE 0.2 MG/ML
0.2 INJECTION INTRAMUSCULAR; INTRAVENOUS
Status: DISCONTINUED | OUTPATIENT
Start: 2017-12-26 | End: 2018-01-04 | Stop reason: HOSPADM

## 2017-12-26 RX ORDER — LEVOFLOXACIN 5 MG/ML
750 INJECTION, SOLUTION INTRAVENOUS EVERY 24 HOURS
Status: DISCONTINUED | OUTPATIENT
Start: 2017-12-26 | End: 2017-12-26 | Stop reason: SDUPTHER

## 2017-12-26 RX ADMIN — LEVOFLOXACIN 750 MG: 5 INJECTION, SOLUTION INTRAVENOUS at 16:33

## 2017-12-26 RX ADMIN — Medication 10 ML: at 21:43

## 2017-12-26 RX ADMIN — AZTREONAM 2 G: 2 INJECTION, POWDER, LYOPHILIZED, FOR SOLUTION INTRAMUSCULAR; INTRAVENOUS at 01:24

## 2017-12-26 RX ADMIN — ASPIRIN 81 MG 81 MG: 81 TABLET ORAL at 21:43

## 2017-12-26 RX ADMIN — Medication 100 MG: at 09:15

## 2017-12-26 RX ADMIN — LATANOPROST 1 DROP: 50 SOLUTION OPHTHALMIC at 21:44

## 2017-12-26 RX ADMIN — DAKIN'S SOLUTION 0.125% (QUARTER STRENGTH): 0.12 SOLUTION at 06:44

## 2017-12-26 RX ADMIN — PANTOPRAZOLE SODIUM 20 MG: 40 TABLET, DELAYED RELEASE ORAL at 06:42

## 2017-12-26 RX ADMIN — POLYETHYLENE GLYCOL 3350 17 G: 17 POWDER, FOR SOLUTION ORAL at 21:43

## 2017-12-26 RX ADMIN — Medication 10 ML: at 14:00

## 2017-12-26 RX ADMIN — AZTREONAM 2 G: 2 INJECTION, POWDER, LYOPHILIZED, FOR SOLUTION INTRAMUSCULAR; INTRAVENOUS at 09:30

## 2017-12-26 RX ADMIN — CARBIDOPA AND LEVODOPA 1.5 TABLET: 25; 100 TABLET ORAL at 17:58

## 2017-12-26 RX ADMIN — LACTULOSE 20 G: 20 SOLUTION ORAL at 09:15

## 2017-12-26 RX ADMIN — CARBIDOPA AND LEVODOPA 1.5 TABLET: 25; 100 TABLET ORAL at 21:43

## 2017-12-26 RX ADMIN — DAKIN'S SOLUTION 0.125% (QUARTER STRENGTH): 0.12 SOLUTION at 13:00

## 2017-12-26 RX ADMIN — CARBIDOPA AND LEVODOPA 1.5 TABLET: 25; 100 TABLET ORAL at 09:14

## 2017-12-26 RX ADMIN — Medication 10 ML: at 06:45

## 2017-12-26 RX ADMIN — DAKIN'S SOLUTION 0.125% (QUARTER STRENGTH): 0.12 SOLUTION at 21:44

## 2017-12-26 RX ADMIN — CASTOR OIL AND BALSAM, PERU: 788; 87 OINTMENT TOPICAL at 17:54

## 2017-12-26 NOTE — WOUND CARE
WOCN Note:      Follow up assessment of sacral ulcer.     Chart reviewed. Admitted DX:  Weakness  Past Medical History:  hypercholesterolemia, coronary artery disease status post CABG, Parkinson disease, prostate cancer status post prostatectomy  Admitted from home. S/P debridement of sacral wound on 12.22.17 by Dr Yessenia Harrell     Assessment:   Patient is Alert, and requires assist of 2 with repositioning. Patient wearing briefs for incontinence and has a condom catheter. All areas are present on admission.     Bilateral heels and buttocks skin intact and without erythema. Left lateral malleolus 2.5 x 1.5 x 0 cm hyperpigmented area. Left medial knee 0.5 x 1 x 0 cm hypopigmented area. Blanching pink erythema to left trochanter and right trochanter.     Sacral, Pressure Injury Stage 4 Present on Admission:  11 x 8 x 2.4 cm. Wound bed mix of 50% red 30% tan  20% brown with palpable bone;   Black rim along perimeter from 11-2 and 5-8. Periwound hyperpigmented non blanching purple; malodor noted; tender to touch; small serousanguinous exudate. Right knee, partial thickness wounds 1.5 x 1 x 0.2 cm; 100% yellow  Left knee, partial thickness wounds; 0.4 x 0.4 x 0.1 cm; 100% yellow     Heels offloaded on prevalon boots.      Recommendations:    Prevalon boots  Upgrade bed  Turn team  Sacrum:  Dressing change per surgeons order. Venelex to right and left trochanters twice daily  Right knee and Left knee:  Daily apply xerofom and cover with mepilex border.     Skin Care & Pressure Prevention:  Minimize layers of linen/pads under patient to optimize support surface. Turn/reposition approximately every 2 hours and offload heels. Manage incontinence / promote continence.   Specialty bed:  Sport or P500     Discussed above plan with Dr Claude Hutching and Gloria.     Transition of Care: Plan to follow as needed while admitted to hospital.     ZOE Quinteros RN  Office [phone]  Pager [phone]

## 2017-12-26 NOTE — PROGRESS NOTES
Problem: Mobility Impaired (Adult and Pediatric)  Goal: *Acute Goals and Plan of Care (Insert Text)  Physical Therapy Goals  Initiated 12/16/2017  Goals reassessed and remain appropriate over the next 7 days  1. Patient will move from supine to sit and sit to supine , scoot up and down and roll side to side in bed with moderate assistance  within 7 day(s). 2.  Patient will transfer from bed to chair and chair to bed with maximal assistance using the least restrictive device within 7 day(s). Discontinue. Updated goal:  Patient will tolerate bed in chair position x 30 minutes 2 x daily with BP stable for improved tolerance to upright positioning within 7 days. 3. Patient will demonstrate good static sitting balance with mod A in 7 days. 4.  Patient will participate in active ROM exercises for all joints within 7 days. physical Therapy TREATMENT: WEEKLY REASSESSMENT  Patient: Jesus Delgadillo (89 y.o. male)  Date: 12/26/2017  Diagnosis: Weakness  Weakness  SACRAL ABSCESS Weakness  Procedure(s) (LRB):  DEBRIDEMENT OF SACRAL ULCER  REQ 1000 (N/A) 4 Days Post-Op  Precautions:      ASSESSMENT:  Patient continues to required total A x 2 for all aspects of bed mobility including rolling/repositioning. Patient received with B knees flexed to ~90 degrees. With increased time and slight over pressure, able to improve knee ROM to ~20-30 degrees from full extension for improved positioning and skin integrity. Patient repositioned with pillows under each hip for pressure relief and pressure relieving boots repositioned for improved fit. Attempted to tolerate bed in chair position although BP with steady decline. Tolerated ~8 minutes with bed in chair position before returning bed to flat position with head elevated slightly-VSS. Continue to recommend SNF at discharge.   Patient's progression toward goals since last assessment: goals downgraded     PLAN:  Goals have been updated based on progression since last assessment. Patient continues to benefit from skilled intervention to address the above impairments. Continue to follow the patient 2 times a week to address goals. Planned Interventions:  [x]              Bed Mobility Training             [x]       Neuromuscular Re-Education  [x]              Transfer Training                   []       Orthotic/Prosthetic Training  []              Gait Training                         []       Modalities  [x]              Therapeutic Exercises           []       Edema Management/Control  [x]              Therapeutic Activities            [x]       Patient and Family Training/Education  []              Other (comment):  Discharge Recommendations: Skilled Nursing Facility  Further Equipment Recommendations for Discharge: TBD     SUBJECTIVE:   Patient stated No.  When asked if he was in pain. OBJECTIVE DATA SUMMARY:   Critical Behavior:  Neurologic State: Alert  Orientation Level: Unable to verbalize  Cognition: Decreased attention/concentration, Decreased command following  Safety/Judgement: Not assessed    Strength:   Strength: Grossly decreased, non-functional                      Functional Mobility Training:  Bed Mobility:  Rolling: Total assistance;Assist x2  Supine to Sit: Total assistance;Assist x2              Transfers:                                   Balance:  Sitting: With support; Impaired (bed in chair position, R lean)  Ambulation/Gait Training:                                                        Stairs:           Neuro Re-Education:    Therapeutic Exercises:     Pain:  Pain Scale 1: Adult Nonverbal Pain Scale  Pain Intensity 1: 0              Activity Tolerance:   BP drops slowly with bed in chair position  Please refer to the flowsheet for vital signs taken during this treatment.   After treatment:   []  Patient left in no apparent distress sitting up in chair  [x]  Patient left in no apparent distress in bed  [x]  Call bell left within reach  [x]  Nursing notified  [x]  Caregiver present  []  Bed alarm activated    COMMUNICATION/COLLABORATION:   The patients plan of care was discussed with: Registered Nurse    Sangeeta Clay, PT   Time Calculation: 24 mins

## 2017-12-26 NOTE — PROGRESS NOTES
Met with Mr. Kristyn Mercado and his wife at the bedside. Provided SNF provider list during this interaction.   Request to send referral to [facility] received and action completed in [address]  (Mrs. Kristyn Mercado also states she has used [facility] in the past for home health needs.)    Eva Arana RN

## 2017-12-26 NOTE — PROGRESS NOTES
Hospitalist Progress Note  Eliseo Mcneill MD  Answering service: [phone] from in house phone         Date of Service:  2017  NAME:  Cristina Howell  :  1940  MRN:  [id number]    Admission Summary:      Patient admitted for weight loss and failure to thrive     Interval history / Subjective:       Pt seen at bed side with tech and with wife and children  Pt seems to be able to marifer Sinemetbut but functionally pt is with advanced wt loss, low muscle mass and stiff,    Last CXR of record reviewed by me and agree that no mass noted, but CT is definitive   Assessment & Plan:      Weakness - generalized  Multifactorial - deconditioning,failure to thrive,severe debility  tsh and Cortisol levels wnl  MRI - old pontine infarct,Vasular Dementia?   Neurology consulted - continue asa  Speech eval - Recommends NPO for now  NG tube for medications   Dietary consult - started on tube feeding ()  Aspiration Precaution  Little progress, discussed poor purges with wife 2017      Paraoxysmal Afib  Now in NSR  New onset ()  [facility] dig  [facility]   Cardiology Consulted  Echo showing EF of 65%     Unintentional Weight loss  CT chest showing possible lung nodule/mass - consulted pulmonary  Lung biopsy - Ct guided on , however Evaluated by radiology and doubt this is a true lung tumor and recommends follow up ct in 6 months  Pulmonary following and defer investigations  For PET CT , ordered 2017      Failure to thrive and dysphagia with Severe protein calorie nutrition  NG for feeding and medication admistration   Palliative consulted to discuss with family regarding goals of care if family wishes to proceed with peg tube - for now the wife would like to hold off for now  Family wants to give a chance for parkinson medications to work before deciding on peg     UTI  On levaquin since   Ucx  Pseudomonas and E coli  Change antibiotics to Aztreonam.  WBC 9.8  But both pseudomonas and e. Coli sense to Levaquin, back on Levaquin to complete a 2 week course of abx. , case discussed with Pharmacy.          Sacral ulcer - unstageable on admission  Sacral, Pressure Injury Unstageable Present on Admission:  7.5 x 5.2 x 0.4 cm 100% soft eschar with 10% pink noted around perimeter.  Periwound hyperpigmented and non blanching; malodor noted; tender to touch; small serous exudate. Wound care consulted  Surgery consulted. Sacral ulcer debridement was done 06/21.     Metabolic encephalopathy from Hypernatremia on admission   Although hypernatremia resolved,mentation not much improved likely due to multiple medical conditions such as advanced Parkinson's disease,dementia,infection,brain infarct. MRI brain showing old pontine infarct  Continue to monitor mentation,doubt will much improve        Hypernatremia  Resolved  Could be related to reset osmostat in mineralocorticoid use. poor po intake  Hold Florinef  Dextrose IVF  Appreciate renal input     Code status: Full  DVT prophylaxis: SCD     Care Plan discussed with: Patient/Family  Disposition: TBD      12/16 - d/w patients wife and updated on plan, She is concerned about his Parkinson medication, I have called pharmacy and verified its not here. RN will call ED to see if the medication is there, she also reports that he is not on sinemet anymore as he had intolerance to it.   12/17 - updated wife at bedside on plan of care  12/18 - NG placed by nursing however placement was not confirmed, removed  12/19 - Ng placed by IR - Feeding orders given   12/20-  Went into afib new onset. Rate controlled. palliative discussed with patients wife and she wants to leave as full code and all measures done. 12/21 - NSR on monitor. Plan of care discussed with aptients family and they would want full code. 12/22 - Wife in the room with sister and daughter. Plan of care discussed.   12/26  - wife and daughters in room discussed course,   Made family aware of planned PetCT.           Hospital Problems  Date Reviewed: 12/15/2017          Codes Class Noted POA    Metabolic encephalopathy SCB-82-QV: G93.41  ICD-9-CM: 348.31  12/21/2017 Unknown        * (Principal)Weakness ICD-10-CM: R53.1  ICD-9-CM: 780.79  12/15/2017 Yes        Parkinson disease ([facility] [address] (Chronic) ICD-10-CM: G20  ICD-9-CM: 332.0  11/7/2016 Yes                Review of Systems:   Review of systems not obtained due to patient factors. Vital Signs:    Last 24hrs VS reviewed since prior progress note. Most recent are:  Visit Vitals    /47 (BP 1 Location: Left arm, BP Patient Position: At rest)    Pulse 63    Temp 97.7 °F (36.5 °C)    Resp 18    Ht 6' 1\" (1.854 m)    Wt 57 kg (125 lb 9.6 oz)    SpO2 99%    BMI 16.57 kg/m2         Intake/Output Summary (Last 24 hours) at 12/26/17 1328  Last data filed at 12/26/17 1200   Gross per 24 hour   Intake             1360 ml   Output                0 ml   Net             1360 ml        Physical Examination:             Constitutional:  No acute distress, cooperative, pleasant    ENT:  Oral mucous dry - mouth breaths.;  oropharynx w. o mass lesions Neck supple,    Resp:  CTA bilaterally. No wheezing/rhonchi/rales. No accessory muscle use   CV:  Regular rhythm, normal rate, no murmurs, gallops, rubs    GI:  Soft, non distended, non tender. normoactive bowel sounds, no hepatosplenomegaly     Musculoskeletal:  No edema, warm,generalized muscle loss. Neurologic:  non responsive to moving ext on encouragement, non verlba . A      Psych:  non responsive, non verbal  no agitated not apparently anxious.         Data Review:    Review and/or order of clinical lab test      Labs:     Recent Labs      12/26/17 0522 12/25/17   0246   WBC  8.2  9.8   HGB  9.7*  8.6*   HCT  28.9*  25.4*   PLT  450*  383     Recent Labs      12/26/17   0522  12/25/17   0252  12/25/17   0246  12/24/17   0358   NA  144 --   143  139   K  3.7   --   3.5  4.3   CL  108   --   109*  106   CO2  29   --   29  24   BUN  11   --   11  11   CREA  0.69*   --   0.80  0.86   GLU  113*   --   107*  98   CA  8.5   --   8.0*  8.1*   MG   --    --    --   2.3   PHOS  2.8  3.2   --   2.5*     No results for input(s): SGOT, GPT, ALT, AP, TBIL, TBILI, TP, ALB, GLOB, GGT, AML, LPSE in the last 72 hours. No lab exists for component: AMYP, HLPSE  No results for input(s): INR, PTP, APTT in the last 72 hours. No lab exists for component: INREXT   No results for input(s): FE, TIBC, PSAT, FERR in the last 72 hours. No results found for: FOL, RBCF   No results for input(s): PH, PCO2, PO2 in the last 72 hours. No results for input(s): CPK, CKNDX, TROIQ in the last 72 hours.     No lab exists for component: CPKMB  Lab Results   Component Value Date/Time    Cholesterol, total 106 12/16/2017 05:15 AM    HDL Cholesterol 26 12/16/2017 05:15 AM    LDL, calculated 58.4 12/16/2017 05:15 AM    Triglyceride 108 12/16/2017 05:15 AM    CHOL/HDL Ratio 4.1 12/16/2017 05:15 AM     Lab Results   Component Value Date/Time    Glucose (POC) 107 12/20/2017 11:58 AM    Glucose (POC) 91 11/27/2009 12:20 PM     Lab Results   Component Value Date/Time    Color YELLOW/STRAW 12/21/2017 12:00 AM    Appearance CLOUDY 12/21/2017 12:00 AM    Specific gravity 1.009 12/21/2017 12:00 AM    pH (UA) 6.0 12/21/2017 12:00 AM    Protein NEGATIVE  12/21/2017 12:00 AM    Glucose NEGATIVE  12/21/2017 12:00 AM    Ketone NEGATIVE  12/21/2017 12:00 AM    Bilirubin NEGATIVE  12/21/2017 12:00 AM    Urobilinogen 1.0 12/21/2017 12:00 AM    Nitrites NEGATIVE  12/21/2017 12:00 AM    Leukocyte Esterase MODERATE 12/21/2017 12:00 AM    Epithelial cells FEW 12/21/2017 12:00 AM    Bacteria 4+ 12/21/2017 12:00 AM    WBC 10-20 12/21/2017 12:00 AM    RBC 10-20 12/21/2017 12:00 AM         Medications Reviewed:     Current Facility-Administered Medications   Medication Dose Route Frequency    glycopyrrolate (ROBINUL) injection 0.2 mg  0.2 mg IntraVENous Q4H PRN    aztreonam (AZACTAM) 2 g in 0.9% sodium chloride 100 mL IVPB  2 g IntraVENous Q8H    sodium hypochlorite (QUARTER STRENGTH DAKIN'S) 0.125% irrigation (bottle)   Topical TID    carbidopa-levodopa (SINEMET)  mg per tablet 1.5 Tab  1.5 Tab Oral QID    pantoprazole (PROTONIX) 2 mg/mL oral suspension 20 mg  20 mg Oral ACB    lidocaine (XYLOCAINE) 2 % jelly   Mucous Membrane PRN    thiamine (B-1) tablet 100 mg  100 mg Per NG tube DAILY    pimavanserin tab 34 mg (Patient Supplied)  34 mg Per NG tube DAILY    lactulose (CHRONULAC) solution 20 g  20 g Per NG tube BID    morphine injection 2 mg  2 mg IntraVENous Q4H PRN    aspirin chewable tablet 81 mg  81 mg Oral QHS    polyethylene glycol (MIRALAX) packet 17 g  17 g Oral QHS    latanoprost (XALATAN) 0.005 % ophthalmic solution 1 Drop  1 Drop Both Eyes QHS    sodium chloride (NS) flush 5-10 mL  5-10 mL IntraVENous Q8H    sodium chloride (NS) flush 5-10 mL  5-10 mL IntraVENous PRN    acetaminophen (TYLENOL) tablet 650 mg  650 mg Oral Q4H PRN    Or    acetaminophen (TYLENOL) solution 650 mg  650 mg Per NG tube Q4H PRN    Or    acetaminophen (TYLENOL) suppository 650 mg  650 mg Rectal Q4H PRN    rivastigmine (EXELON) 9.5 mg/24 hr patch 1 Patch  1 Patch TransDERmal DAILY     ______________________________________________________________________  EXPECTED LENGTH OF STAY: 6d 7h  ACTUAL LENGTH OF STAY:          11                 Jagruti Graham MD

## 2017-12-26 NOTE — PROGRESS NOTES
Problem: Self Care Deficits Care Plan (Adult)  Goal: *Acute Goals and Plan of Care (Insert Text)  Occupational Therapy Goals:  Goals reviewed and downgraded to reflect current status: 12/26/17  1. Patient will perform 1 simple grooming task with overall Max A x1 within 7 days. 2.  Patient will participate in anterior upper body bathing with overall Max A x1 within 7 days. 3.  Patient will perform rolling in bed with Max A x2 in preparation for toileting and hygiene within 7 days. 4.  Patient will complete toilet transfer with overall Max A x2 within 7 days. Initiated 12/18/2017  1. Patient will perform simple grooming tasks with overall Mod A x1 within 7 days. 2.  Patient will participate in anterior upper body bathing with overall Mod A x1 within 7 days. 3.  Patient will perform rolling in bed with Mod A x2 in preparation for toileting and hygiene within 7 days. 4.  Patient will complete toilet transfer with overall Mod A x2 within 7 days. Occupational Therapy TREATMENT: WEEKLY REASSESSMENT  Patient: Goldie Ham (87 y.o. male)  Date: 12/26/2017  Diagnosis: Weakness  Weakness  SACRAL ABSCESS Weakness  Procedure(s) (LRB):  DEBRIDEMENT OF SACRAL ULCER  REQ 1000 (N/A) 4 Days Post-Op  Precautions:    Chart, occupational therapy assessment, plan of care, and goals were reviewed. ASSESSMENT:  Patient seen in co treat with Physical Therapy to maximize safety and mobility. Patient received in bed with bilateral LEs and UEs flexed. Assisted patient in extending limbs with increased time and gentle pressure. Unable to extend elbows secondary to rigidity. No active or functional movement of UEs noted during session. Patient responds to his name, demonstrates impaired visual attention and tracking, impaired head turn to left, impaired command following. Did best with raised voice and yes/no questions. Placed patient in modified chair position and tolerated approximately 8 min before BP decreased to 106/45. Returned patient to supine with head elevated (NG tube feeding). BP increased to 114/47. Initial BP: 130/52. Patient currently total assist for all self care. Daughter present. Goals downgraded to reflect current status. Recommend SNF. Progression toward goals:  []          Improving appropriately and progressing toward goals  []          Improving slowly and progressing toward goals  [x]          Not making progress toward goals and plan of care has been adjusted     PLAN:  Goals have been updated based on progression since last assessment. Patient continues to benefit from skilled intervention to address the above impairments. Continue to follow patient 2 times a week to address goals.   Planned Interventions:  [x]                  Self Care Training                  [x]           Therapeutic Activities  [x]                  Functional Mobility Training    [x]           Cognitive Retraining  [x]                  Therapeutic Exercises           [x]           Endurance Activities  []                  Balance Training                   [x]           Neuromuscular Re-Education  []                  Visual/Perceptual Training     []      Home Safety Training  [x]                  Patient Education                 []           Family Training/Education  []                  Other (comment):  Discharge Recommendations: Skilled Nursing Facility  Further Equipment Recommendations for Discharge: none     SUBJECTIVE:   Patient stated No. when asked if he had pain and also when asked if he was cold    OBJECTIVE DATA SUMMARY:   Cognitive/Behavioral Status:  Neurologic State: Lethargic;Eyes open to voice  Orientation Level: Unable to verbalize, but responds to his name  Cognition: Decreased command following;Decreased attention/concentration  Perception: Cues to attend to left side of body;Cues to attend left visual field;Cues to maintain midline in sitting  Perseveration: No perseveration noted  Safety/Judgement: Decreased awareness of environment  Functional Mobility and Transfers for ADLs:   Bed Mobility:  Rolling: Total assistance;Assist x2  Supine to Sit: Total assistance;Assist x2          Transfers:           Balance:  Sitting: Impaired  ADL Intervention:  Feeding  Feeding Assistance:  (NG)    Grooming  Grooming Assistance: Total assistance(dependent) (inferred from mobility and command following)    Upper Body Bathing  Bathing Assistance: Total assistance(dependent) (inferred from mobility and command following)         Upper Body Dressing Assistance  Dressing Assistance: Total assistance(dependent) (inferred from mobility and command following)              Cognitive Retraining  Organizing/Sequencing: Breaking task down  Following Commands: Follows one step commands/directions (intermittently)  Safety/Judgement: Decreased awareness of environment  Cues: Verbal cues provided; Tactile cues provided         Activity Tolerance:    Poor  Please refer to the flowsheet for vital signs taken during this treatment.   After treatment:   [] Patient left in no apparent distress sitting up in chair  [x] Patient left in no apparent distress in bed  [x] Call bell left within reach  [x] Nursing notified  [x] Caregiver present(daughter)  [] Bed alarm activated    COMMUNICATION/COLLABORATION:   The patients plan of care was discussed with: Physical Therapist, Occupational Therapist and Registered Nurse    ALIE Valentin  Time Calculation: 27 mins

## 2017-12-26 NOTE — ROUTINE PROCESS
Bedside and Verbal shift change report given to BALJIT Castro (oncoming nurse) by Ute Lema RN (offgoing nurse). Report included the following information SBAR, Kardex, Intake/Output, MAR, Accordion, Recent Results and Cardiac Rhythm NSR.

## 2017-12-26 NOTE — PROGRESS NOTES
Problem: Falls - Risk of  Goal: *Absence of Falls  Document Ora Fall Risk and appropriate interventions in the flowsheet.    Outcome: Progressing Towards Goal  Fall Risk Interventions:  Mobility Interventions: Bed/chair exit alarm, OT consult for ADLs, Patient to call before getting OOB, PT Consult for mobility concerns, PT Consult for assist device competence    Mentation Interventions: Door open when patient unattended, Increase mobility, More frequent rounding, Reorient patient, Room close to nurse's station, Toileting rounds, Update white board    Medication Interventions: Evaluate medications/consider consulting pharmacy, Patient to call before getting OOB, Teach patient to arise slowly    Elimination Interventions: Call light in reach, Toileting schedule/hourly rounds, Patient to call for help with toileting needs

## 2017-12-26 NOTE — PROGRESS NOTES
Problem: Falls - Risk of  Goal: *Absence of Falls  Document Ora Fall Risk and appropriate interventions in the flowsheet.    Outcome: Progressing Towards Goal  Fall Risk Interventions:  Mobility Interventions: Communicate number of staff needed for ambulation/transfer, PT Consult for mobility concerns, PT Consult for assist device competence, Strengthening exercises (ROM-active/passive)    Mentation Interventions: Adequate sleep, hydration, pain control, Door open when patient unattended, Family/sitter at bedside, More frequent rounding, Increase mobility, Reorient patient, Update white board    Medication Interventions: Patient to call before getting OOB, Teach patient to arise slowly    Elimination Interventions: Call light in reach, Toileting schedule/hourly rounds

## 2017-12-26 NOTE — INTERDISCIPLINARY ROUNDS
IDR/SLIDR Summary          Patient: Jefry Cabreran MRN: [id number]    Age: 68 y.o. YOB: 1940 Room/Bed: [facility]   Admit Diagnosis: Weakness  Weakness  SACRAL ABSCESS  Principal Diagnosis: Weakness   Goals: Continue feeding, Palliative consult  Readmission: NO  Quality Measure: Not applicable  VTE Prophylaxis: Mechanical  Influenza Vaccine screening completed? YES  Pneumococcal Vaccine screening completed? NO  Mobility needs: Yes   Nutrition plan:Yes  Consults:P.T, O.T., Speech, Respiratory and Case Management    Financial concerns:No  Escalated to CM? YES  RRAT Score: 10   Interventions:H2H  Testing due for pt today? YES  LOS: 11 days Expected length of stay 10-12 days  Discharge plan: home w/ home health   PCP: Shalini Ward MD  Transportation needs: Yes    Days before discharge:two or more days before discharge   Discharge disposition: Home    Signed:      Gely Morejon RN  12/26/17  3231 am

## 2017-12-26 NOTE — PROGRESS NOTES
Palliative Medicine Consult  Gianni: [phone] ([phone])    Patient Name: Didi Martinez  YOB: 1940    Date of Initial Consult: December 18, 2017  Reason for Consult: Care Decisions  Requesting Provider: Dr. Leo Parks  Primary Care Physician: Lauren Rodriguez MD     SUMMARY:   Didi Martinez is a 68 y.o. with a past history of CAD, Parkinson's Disease, hypercholesterolemia, osteoporosis, prostate cancer s/p prostatectomy who was admitted on 12/15/2017 from home with a diagnosis of hypernatremia, increasing weakness, unintentional wt loss. Admission complicated by left lower lobe lung mass concerning for neoplasia but CT images reveal more of a rounded pneumonia per pulmonary notes. Dysphagia also noted. Pt scheduled for NG tube placed by IR 12/18/17. Current medical issues leading to Palliative Medicine involvement include: care goals. Social: , 2 children, 1 grand child,     Interval History:   NGT placed  wound debridement 12/22/17  Parkinson's meds started 12/21/17     PALLIATIVE DIAGNOSES:   1. Dysphagia  2. Cachexia  3. Rigidity   4. Dysphonia   5. Physical debility  6. Parkinson's Disease  7. Pressure Ulcer  8. Bed bound status     PLAN:   1. Wife not present today  2. Pt more responsive today but overall unchanged  3. Spoke with SLP who's assesment remains the same  4. Pt will likely need peg placed unless wife agrees to comfort feeds. My discussions with wife lead me to believe that she will not consider comfort feeds but I will re-visit with her again  5. Wife is receptive to hospice once acute issues are addressed and the pt is ready for discharge home  6. Robinul ordered for secretions  7. Will continue to follow    8. Goals: start parkinson's meds,continue current level of care, address acute issues, full code status, feeding tube if indicated, return home possibly with hospice    9. Initial consult note routed to primary continuity provider  10.  Communicated plan of care with: Palliative IDT       GOALS OF CARE / TREATMENT PREFERENCES:     GOALS OF CARE: Continue current level of care. Full Code status. Return home upon dc. Patient/Health Care Proxy Stated Goals: Prolong life      TREATMENT PREFERENCES:   Code Status: Full Code    Advance Care Planning:  Advance Care Planning 12/20/2017   Patient's Healthcare Decision Maker is: Legal Next of Alex 69   Primary Decision Maker Name Mrs. Charly Young Decision Maker Phone Number [phone]   Primary Decision Maker Relationship to Patient Spouse   Confirm Advance Directive None   Patient Would Like to Complete Advance Directive Unable       Medical Interventions: Full interventions   Other Instructions:   Artificially Administered Nutrition: Feeding tube long-term, if indicated     Other:    As far as possible, the palliative care team has discussed with patient / health care proxy about goals of care / treatment preferences for patient. HISTORY:     HPI/SUBJECTIVE:    The patient is:   [] Verbal and participatory  [x] Non-participatory due to:   Medical condition. Able to deny pain     Clinical Pain Assessment (nonverbal scale for severity on nonverbal patients):   Clinical Pain Assessment  Severity: 0     Activity (Movement): Lying quietly, normal position    Duration: for how long has pt been experiencing pain (e.g., 2 days, 1 month, years)  Frequency: how often pain is an issue (e.g., several times per day, once every few days, constant)     FUNCTIONAL ASSESSMENT:     Palliative Performance Scale (PPS):  PPS: 30       PSYCHOSOCIAL/SPIRITUAL SCREENING:     Palliative IDT has assessed this patient for cultural preferences / practices and a referral made as appropriate to needs (Cultural Services, Patient Advocacy, Ethics, etc.)    Advance Care Planning:  Advance Care Planning 12/20/2017   Patient's Healthcare Decision Maker is: Legal Next of [address] [place] [address] Name   [facility]   Primary Decision Maker Phone Number [phone]   Primary Decision Maker Relationship to Patient Spouse   Confirm Advance Directive None   Patient Would Like to Complete Advance Directive Unable       Any spiritual / Pentecostal concerns:  [] Yes /  [x] No    Caregiver Burnout:  [] Yes /  [x] No /  [] No Caregiver Present      Anticipatory grief assessment:   [x] Normal  / [] Maladaptive       ESAS Anxiety: Anxiety: 0    ESAS Depression:          REVIEW OF SYSTEMS:     Positive and pertinent negative findings in ROS are noted above in HPI. The following systems were [x] reviewed objectively / [] unable to be reviewed as noted in HPI  Other findings are noted below. Systems: constitutional, ears/nose/mouth/throat, respiratory, gastrointestinal, genitourinary, musculoskeletal, integumentary, neurologic, psychiatric, endocrine. Positive findings noted below. Modified ESAS Completed by: provider   Fatigue: 10 Drowsiness: 10     Pain: 0   Anxiety: 0 Nausea: 0   Anorexia: 10 Dyspnea: 0           Stool Occurrence(s): 1        PHYSICAL EXAM:     From RN flowsheet:  Wt Readings from Last 3 Encounters:   12/26/17 125 lb 9.6 oz (57 kg)   04/01/17 123 lb (55.8 kg)   11/11/16 118 lb (53.5 kg)     Blood pressure 125/44, pulse 62, temperature 97.9 °F (36.6 °C), resp. rate 14, height 6' 1\" (1.854 m), weight 125 lb 9.6 oz (57 kg), SpO2 100 %.     Pain Scale 1: Adult Nonverbal Pain Scale  Pain Intensity 1: 0     Pain Location 1: Coccyx  Pain Orientation 1: Posterior  Pain Description 1: Aching, Sharp  Pain Intervention(s) 1: Repositioned  Last bowel movement, if known:     Constitutional: frail, cachectic, nad  Eyes: pupils equal, anicteric  ENMT: no nasal discharge, moist mucous membranes, +NGT  Cardiovascular: regular rhythm, distal pulses intact  Respiratory: breathing not labored, symmetric  Gastrointestinal: soft non-tender, +bowel sounds  Musculoskeletal: no deformity, no tenderness to palpation  Skin: warm, dry  Neurologic: following no commands, Parkinson's disease  Psychiatric:   Other:       HISTORY:     Principal Problem:    Weakness (12/15/2017)    Active Problems:    Parkinson disease ([facility] [address] (14/9/3180)      Metabolic encephalopathy (81/16/9301)      Past Medical History:   Diagnosis Date    CAD (coronary artery disease)     Hypercholesteremia     Osteoporosis     [facility]       Past Surgical History:   Procedure Laterality Date    CARDIAC SURG PROCEDURE UNLIST      bipass x 2    HX PROSTATECTOMY        History reviewed. No pertinent family history. History reviewed, no pertinent family history.   Social History   Substance Use Topics    Smoking status: Former Smoker    Smokeless tobacco: Not on file    Alcohol use No     No Known Allergies   Current Facility-Administered Medications   Medication Dose Route Frequency    aztreonam (AZACTAM) 2 g in 0.9% sodium chloride 100 mL IVPB  2 g IntraVENous Q8H    sodium hypochlorite (QUARTER STRENGTH DAKIN'S) 0.125% irrigation (bottle)   Topical TID    carbidopa-levodopa (SINEMET)  mg per tablet 1.5 Tab  1.5 Tab Oral QID    pantoprazole (PROTONIX) 2 mg/mL oral suspension 20 mg  20 mg Oral ACB    lidocaine (XYLOCAINE) 2 % jelly   Mucous Membrane PRN    thiamine (B-1) tablet 100 mg  100 mg Per NG tube DAILY    pimavanserin tab 34 mg (Patient Supplied)  34 mg Per NG tube DAILY    lactulose (CHRONULAC) solution 20 g  20 g Per NG tube BID    morphine injection 2 mg  2 mg IntraVENous Q4H PRN    aspirin chewable tablet 81 mg  81 mg Oral QHS    polyethylene glycol (MIRALAX) packet 17 g  17 g Oral QHS    latanoprost (XALATAN) 0.005 % ophthalmic solution 1 Drop  1 Drop Both Eyes QHS    sodium chloride (NS) flush 5-10 mL  5-10 mL IntraVENous Q8H    sodium chloride (NS) flush 5-10 mL  5-10 mL IntraVENous PRN    acetaminophen (TYLENOL) tablet 650 mg  650 mg Oral Q4H PRN    Or    acetaminophen (TYLENOL) solution 650 mg  650 mg Per NG tube Q4H PRN    Or    acetaminophen (TYLENOL) suppository 650 mg  650 mg Rectal Q4H PRN    rivastigmine (EXELON) 9.5 mg/24 hr patch 1 Patch  1 Patch TransDERmal DAILY          LAB AND IMAGING FINDINGS:     Lab Results   Component Value Date/Time    WBC 8.2 12/26/2017 05:22 AM    HGB 9.7 12/26/2017 05:22 AM    PLATELET 033 33/58/7150 05:22 AM     Lab Results   Component Value Date/Time    Sodium 144 12/26/2017 05:22 AM    Potassium 3.7 12/26/2017 05:22 AM    Chloride 108 12/26/2017 05:22 AM    CO2 29 12/26/2017 05:22 AM    BUN 11 12/26/2017 05:22 AM    Creatinine 0.69 12/26/2017 05:22 AM    Calcium 8.5 12/26/2017 05:22 AM    Magnesium 2.3 12/24/2017 03:58 AM    Phosphorus 2.8 12/26/2017 05:22 AM      Lab Results   Component Value Date/Time    AST (SGOT) 62 12/23/2017 02:53 AM    Alk. phosphatase 86 12/23/2017 02:53 AM    Protein, total 5.8 12/23/2017 02:53 AM    Albumin 1.4 12/23/2017 02:53 AM    Globulin 4.4 12/23/2017 02:53 AM     Lab Results   Component Value Date/Time    INR 1.1 07/12/2015 12:15 PM    Prothrombin time 10.8 07/12/2015 12:15 PM    aPTT 27.0 07/12/2015 12:15 PM      No results found for: IRON, FE, TIBC, IBCT, PSAT, FERR   No results found for: PH, PCO2, PO2  No components found for: [place]   Lab Results   Component Value Date/Time     12/21/2017 02:27 PM                Total time: 25 minutes  Counseling / coordination time, spent as noted above: 20 minutes  > 50% counseling / coordination?: y    Prolonged service was provided for  []30 min   []75 min in face to face time in the presence of the patient, spent as noted above. Time Start:   Time End:   Note: this can only be billed with 23705 (initial) or 24092 (follow up). If multiple start / stop times, list each separately.

## 2017-12-26 NOTE — PROGRESS NOTES
Problem: Dysphagia (Adult)  Goal: *Acute Goals and Plan of Care (Insert Text)  Speech therapy goals  Initiated 12/18/2017; continued 12/26/2017   1. Patient will participate in re-assessment of swallow function within 7 days    Speech language pathology dysphagia treatment: WEEKLY REASSESSMENT  Patient: Wade Flores (35 y.o. male)  Date: 12/26/2017  Diagnosis: Weakness  Weakness  SACRAL ABSCESS Weakness  Procedure(s) (LRB):  DEBRIDEMENT OF SACRAL ULCER  REQ 1000 (N/A) 4 Days Post-Op  Precautions: aspiration      ASSESSMENT:  Patient with no change in function since initial assessment. Presents with mod/severe oropharyngeal dysphagia characterized by inability to extract ice from a spoon despite repeated attempts due to oral motor weakness. When ice was placed in his mouth, he demonstrated weak manipulation with delayed posterior propulsion. Suspected delayed swallow initiation and weak hyolaryngeal elevation/excursion via palpation. Strong and repeated coughing after the swallow suggestive of aspiration. Multiple audible swallows suggestive of pharyngeal residue. Patient remains at high risk for aspiration at this time and is not appropriate for PO. Patient's progression toward goals since last assessment: no change in function since initial assessment despite consistent use of Parkinson's meds. PLAN:  Goals have been updated based on progression since last assessment. Patient continues to benefit from skilled intervention to address the above impairments. Continue to follow the patient 2 times a week to address goals. Recommendations and Planned Interventions:  --SLP to downgrade frequency to 2x/week for continued re-assessment of swallow function. --recommend further discussions with MD regarding goals of care.   If goals remain for aggressive care, would consider PEG placement as it does not appear we will see rapid improvement in swallow function for patient to safely meet nutritional needs via PO. If goals are for comfort care, could consider careful handfeeding of purees/thins with known high risks for aspiration. Discharge Recommendations: To Be Determined     SUBJECTIVE:   Patient alert, making eye contact with SLP. Non-verbal     OBJECTIVE:   Cognitive and Communication Status:  Neurologic State: Alert  Orientation Level: Unable to verbalize  Cognition: Decreased attention/concentration, Decreased command following        Safety/Judgement: Not assessed  Dysphagia Treatment:  Oral Assessment:  Oral Assessment  Labial:  (did not follow commands to assess)  Dentition: Natural  P.O. Trials:  Patient Position: upright in bed   Vocal quality prior to P.O.: Other (comment) (nonverbal )  Consistency Presented: Ice chips  How Presented: SLP-fed/presented;Spoon     Bolus Acceptance: Impaired (attempted but unable to extract ice from spoon )  Bolus Formation/Control: Impaired  Type of Impairment: Delayed;Poor  Propulsion: Delayed (# of seconds); Discoordination  Oral Residue: None  Initiation of Swallow: Delayed (# of seconds)  Laryngeal Elevation: Decreased;Weak  Aspiration Signs/Symptoms: Strong cough; Change vocal quality (wet audible secretions )  Pharyngeal Phase Characteristics: Audible swallow; Altered vocal quality;Multiple swallows; Suspected pharyngeal residue (incoordinated swallow )  Effective Modifications:  (ice chip placed in mouth, unable to extract from spoon )  Cues for Modifications:  Moderate-maximal  Comments: no improvements noted in swallow function   Oral Phase Severity: Moderate-severe  Pharyngeal Phase Severity : Moderate-severe                                                                                                                 Pain:  Pain Scale 1: Adult Nonverbal Pain Scale        After treatment:   []                Patient left in no apparent distress sitting up in chair  [x]                Patient left in no apparent distress in bed  [x]                Call bell left within reach  [x]                Nursing notified  []                Caregiver present  []                Bed alarm activated    COMMUNICATION/EDUCATION:     The patients plan of care including recommendations, planned interventions, and recommended diet changes were discussed with: Physical Therapist and Registered Nurse. Heri Fisher M.CD.  CCC-SLP   Time Calculation: 9 mins

## 2017-12-27 LAB — PHOSPHATE SERPL-MCNC: 2.4 MG/DL (ref 2.6–4.7)

## 2017-12-27 PROCEDURE — 84100 ASSAY OF PHOSPHORUS: CPT | Performed by: SURGERY

## 2017-12-27 PROCEDURE — 65270000032 HC RM SEMIPRIVATE

## 2017-12-27 PROCEDURE — 74011250637 HC RX REV CODE- 250/637: Performed by: HOSPITALIST

## 2017-12-27 PROCEDURE — 74011250637 HC RX REV CODE- 250/637: Performed by: PSYCHIATRY & NEUROLOGY

## 2017-12-27 PROCEDURE — 36415 COLL VENOUS BLD VENIPUNCTURE: CPT | Performed by: SURGERY

## 2017-12-27 PROCEDURE — 74011250636 HC RX REV CODE- 250/636: Performed by: INTERNAL MEDICINE

## 2017-12-27 PROCEDURE — 74011250636 HC RX REV CODE- 250/636: Performed by: HOSPITALIST

## 2017-12-27 PROCEDURE — 74011250637 HC RX REV CODE- 250/637: Performed by: FAMILY MEDICINE

## 2017-12-27 PROCEDURE — 77030019563 HC DEV ATTCH FEED HOLL -A

## 2017-12-27 RX ADMIN — CARBIDOPA AND LEVODOPA 1.5 TABLET: 25; 100 TABLET ORAL at 14:11

## 2017-12-27 RX ADMIN — CASTOR OIL AND BALSAM, PERU: 788; 87 OINTMENT TOPICAL at 09:09

## 2017-12-27 RX ADMIN — DAKIN'S SOLUTION 0.125% (QUARTER STRENGTH): 0.12 SOLUTION at 22:09

## 2017-12-27 RX ADMIN — MORPHINE SULFATE 1 MG: 2 INJECTION, SOLUTION INTRAMUSCULAR; INTRAVENOUS at 14:25

## 2017-12-27 RX ADMIN — CARBIDOPA AND LEVODOPA 1.5 TABLET: 25; 100 TABLET ORAL at 17:43

## 2017-12-27 RX ADMIN — CARBIDOPA AND LEVODOPA 1.5 TABLET: 25; 100 TABLET ORAL at 22:09

## 2017-12-27 RX ADMIN — POLYETHYLENE GLYCOL 3350 17 G: 17 POWDER, FOR SOLUTION ORAL at 22:00

## 2017-12-27 RX ADMIN — Medication 10 ML: at 14:22

## 2017-12-27 RX ADMIN — CARBIDOPA AND LEVODOPA 1.5 TABLET: 25; 100 TABLET ORAL at 09:08

## 2017-12-27 RX ADMIN — LACTULOSE 20 G: 20 SOLUTION ORAL at 09:08

## 2017-12-27 RX ADMIN — Medication 100 MG: at 09:09

## 2017-12-27 RX ADMIN — Medication 10 ML: at 22:11

## 2017-12-27 RX ADMIN — DAKIN'S SOLUTION 0.125% (QUARTER STRENGTH): 0.12 SOLUTION at 13:00

## 2017-12-27 RX ADMIN — Medication 10 ML: at 06:30

## 2017-12-27 RX ADMIN — ASPIRIN 81 MG 81 MG: 81 TABLET ORAL at 22:10

## 2017-12-27 RX ADMIN — CASTOR OIL AND BALSAM, PERU: 788; 87 OINTMENT TOPICAL at 17:43

## 2017-12-27 RX ADMIN — LACTULOSE 20 G: 20 SOLUTION ORAL at 17:44

## 2017-12-27 RX ADMIN — DAKIN'S SOLUTION 0.125% (QUARTER STRENGTH): 0.12 SOLUTION at 06:30

## 2017-12-27 RX ADMIN — LATANOPROST 1 DROP: 50 SOLUTION OPHTHALMIC at 22:09

## 2017-12-27 RX ADMIN — LEVOFLOXACIN 750 MG: 5 INJECTION, SOLUTION INTRAVENOUS at 14:11

## 2017-12-27 NOTE — PROGRESS NOTES
Bedside and Verbal shift change report given to Joselyn Marley RN (oncoming nurse) by Kamar Garrido RN (offgoing nurse). Report included the following information SBAR, Kardex, Intake/Output, MAR, Recent Results and Cardiac Rhythm NSR.

## 2017-12-27 NOTE — PROGRESS NOTES
Problem: Falls - Risk of  Goal: *Absence of Falls  Document Ora Fall Risk and appropriate interventions in the flowsheet.    Outcome: Progressing Towards Goal  Fall Risk Interventions:  Mobility Interventions: Communicate number of staff needed for ambulation/transfer    Mentation Interventions: Adequate sleep, hydration, pain control, Door open when patient unattended, Evaluate medications/consider consulting pharmacy, More frequent rounding, Reorient patient, Toileting rounds, Update white board    Medication Interventions: Evaluate medications/consider consulting pharmacy, Patient to call before getting OOB    Elimination Interventions: Call light in reach, Patient to call for help with toileting needs, Toileting schedule/hourly rounds

## 2017-12-27 NOTE — PROGRESS NOTES
Hospitalist Progress Note  Jayden Severino MD  Answering service: [phone] from in house phone         Date of Service:  2017  NAME:  Blanca Onofre  :  1940  MRN:  [id number]    Admission Summary:      Patient admitted for weight loss and failure to thrive     Interval history / Subjective:           Pt seen at bed side with tech and with wife and children  Pt seems to be able to marifer Sinemetbut but functionally pt is with advanced wt loss, low muscle mass and stiff,    Last CXR of record reviewed by me and agree that no mass noted, but CT is definitive     :  Pt somnolent post wound check and morphine  Wife in room, discussed case  Advise that I have removed the antipsychotic as it may be making things worst for pt in setting of encephalopathy. 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   Neurology consulted - continue asa  Speech eval - Recommends NPO for now  NG tube for medications   Dietary consult - started on tube feeding ()  Aspiration Precaution  Little progress, discussed poor prognosis with wife 2017      Paraoxysmal Afib  Now in NSR  New onset ()  Received one dose dig  On [facility]   Cardiology Consulted  Echo showing EF of 65%     Unintentional Weight loss  CT chest showing possible lung nodule/mass - consulted pulmonary  Lung biopsy - Ct guided on , however Evaluated by radiology and doubt this is a true lung tumor and recommends follow up ct in 6 months  Pulmonary following and defer investigations  For PET CT , ordered 2017      Failure to thrive and dysphagia with Severe protein calorie nutrition  NG for feeding and medication admistration   Palliative consulted to discuss with family regarding goals of care if family wishes to proceed with peg tube - for now the wife would like to hold off for now  Family wants to give a chance for parkinson medications to work before deciding on peg     UTI  On levaquin since 12/121  Ucx  Pseudomonas and E coli  Change antibiotics to Aztreonam.  WBC 9.8  But both pseudomonas and e. Coli sense to Levaquin, back on Levaquin to complete a 2 week course of abx. , case discussed with Pharmacy.          Sacral ulcer - unstageable on admission  Sacral, Pressure Injury Unstageable Present on Admission:  7.5 x 5.2 x 0.4 cm 100% soft eschar with 10% pink noted around perimeter.  Periwound hyperpigmented and non blanching; malodor noted; tender to touch; small serous exudate. Wound care consulted  Surgery consulted. Sacral ulcer debridement was done 64/72.     Metabolic encephalopathy from Hypernatremia on admission   Although hypernatremia resolved,mentation not much improved likely due to multiple medical conditions such as advanced Parkinson's disease,dementia,infection,brain infarct. MRI brain showing old pontine infarct  Continue to monitor mentation,doubt will much improve continues, , 12/27/2017 have taken antipsychotic off MAR 2n2 concerns of confusion, EPSE        Hypernatremia  Resolved  Could be related to reset osmostat in mineralocorticoid use. poor po intake  Hold Florinef  Dextrose IVF  Appreciate renal input     Code status: Full  DVT prophylaxis: SCD     Care Plan discussed with: Patient/Family  Disposition: TBD      12/16 - d/w patients wife and updated on plan, She is concerned about his Parkinson medication, I have called pharmacy and verified its not here. RN will call ED to see if the medication is there, she also reports that he is not on sinemet anymore as he had intolerance to it.   12/17 - updated wife at bedside on plan of care  12/18 - NG placed by nursing however placement was not confirmed, removed  12/19 - Ng placed by IR - Feeding orders given   12/20-  Went into afib new onset.  Rate controlled. palliative discussed with patients wife and she wants to leave as full code and all measures done. 12/21 - NSR on monitor. Plan of care discussed with aptients family and they would want full code. 12/22 - Wife in the room with sister and daughter. Plan of care discussed. 12/26  - wife and daughters in room discussed course,   Made family aware of planned PetCT.           Hospital Problems  Date Reviewed: 12/15/2017          Codes Class Noted POA    Metabolic encephalopathy UPO-85-CI: G93.41  ICD-9-CM: 348.31  12/21/2017 Unknown        * (Principal)Weakness ICD-10-CM: R53.1  ICD-9-CM: 780.79  12/15/2017 Yes        Parkinson disease ([facility] [address] (Chronic) ICD-10-CM: G20  ICD-9-CM: 332.0  11/7/2016 Yes                Review of Systems:   Review of systems not obtained due to patient factors. Vital Signs:    Last 24hrs VS reviewed since prior progress note. Most recent are:  Visit Vitals    /64 (BP 1 Location: Left arm, BP Patient Position: At rest)    Pulse 65    Temp 98.1 °F (36.7 °C)    Resp 16    Ht 6' 1\" (1.854 m)    Wt 57 kg (125 lb 9.6 oz)    SpO2 99%    BMI 16.57 kg/m2         Intake/Output Summary (Last 24 hours) at 12/27/17 1558  Last data filed at 12/27/17 8860   Gross per 24 hour   Intake              650 ml   Output                0 ml   Net              650 ml        Physical Examination:             Constitutional:  No acute distress,     ENT:  Oral mucous dry - mouth breaths.;  oropharynx w. o mass lesions Neck supple,    Resp:  CTA bilaterally. No wheezing/rhonchi/rales. No accessory muscle use   CV:  Regular rhythm, normal rate, no murmurs, gallops, rubs    GI:  Soft, non distended, non tender. normoactive bowel sounds, no hepatosplenomegaly     Musculoskeletal:  No edema, warm,generalized muscle loss. Neurologic:  non responsive to moving ext on encouragement, non verlba . A      Psych:  non responsive, non verbal  no agitated not apparently anxious.         Data Review:    Review and/or order of clinical lab test      Labs:     Recent Labs      12/26/17 0522 12/25/17   0246   WBC  8.2  9.8   HGB  9.7*  8.6*   HCT  28.9*  25.4*   PLT  450*  383     Recent Labs      12/27/17   0353  12/26/17   0522  12/25/17   0252  12/25/17   0246   NA   --   144   --   143   K   --   3.7   --   3.5   CL   --   108   --   109*   CO2   --   29   --   29   BUN   --   11   --   11   CREA   --   0.69*   --   0.80   GLU   --   113*   --   107*   CA   --   8.5   --   8.0*   PHOS  2.4*  2.8  3.2   --      No results for input(s): SGOT, GPT, ALT, AP, TBIL, TBILI, TP, ALB, GLOB, GGT, AML, LPSE in the last 72 hours. No lab exists for component: AMYP, HLPSE  No results for input(s): INR, PTP, APTT in the last 72 hours. No lab exists for component: INREXT, INREXT   No results for input(s): FE, TIBC, PSAT, FERR in the last 72 hours. No results found for: FOL, RBCF   No results for input(s): PH, PCO2, PO2 in the last 72 hours. No results for input(s): CPK, CKNDX, TROIQ in the last 72 hours.     No lab exists for component: CPKMB  Lab Results   Component Value Date/Time    Cholesterol, total 106 12/16/2017 05:15 AM    HDL Cholesterol 26 12/16/2017 05:15 AM    LDL, calculated 58.4 12/16/2017 05:15 AM    Triglyceride 108 12/16/2017 05:15 AM    CHOL/HDL Ratio 4.1 12/16/2017 05:15 AM     Lab Results   Component Value Date/Time    Glucose (POC) 107 12/20/2017 11:58 AM    Glucose (POC) 91 11/27/2009 12:20 PM     Lab Results   Component Value Date/Time    Color YELLOW/STRAW 12/21/2017 12:00 AM    Appearance CLOUDY 12/21/2017 12:00 AM    Specific gravity 1.009 12/21/2017 12:00 AM    pH (UA) 6.0 12/21/2017 12:00 AM    Protein NEGATIVE  12/21/2017 12:00 AM    Glucose NEGATIVE  12/21/2017 12:00 AM    Ketone NEGATIVE  12/21/2017 12:00 AM    Bilirubin NEGATIVE  12/21/2017 12:00 AM    Urobilinogen 1.0 12/21/2017 12:00 AM    Nitrites NEGATIVE  12/21/2017 12:00 AM    Leukocyte Esterase MODERATE 12/21/2017 12:00 AM    Epithelial cells FEW 12/21/2017 12:00 AM    Bacteria 4+ 12/21/2017 12:00 AM    WBC 10-20 12/21/2017 12:00 AM    RBC 10-20 12/21/2017 12:00 AM         Medications Reviewed:     Current Facility-Administered Medications   Medication Dose Route Frequency    glycopyrrolate (ROBINUL) injection 0.2 mg  0.2 mg IntraVENous Q4H PRN    levoFLOXacin (LEVAQUIN) 750 mg in D5W IVPB  750 mg IntraVENous Q24H    balsam peru-castor oil (VENELEX)  mg/gram ointment   Topical BID    sodium hypochlorite (QUARTER STRENGTH DAKIN'S) 0.125% irrigation (bottle)   Topical TID    carbidopa-levodopa (SINEMET)  mg per tablet 1.5 Tab  1.5 Tab Oral QID    pantoprazole (PROTONIX) 2 mg/mL oral suspension 20 mg  20 mg Oral ACB    lidocaine (XYLOCAINE) 2 % jelly   Mucous Membrane PRN    thiamine (B-1) tablet 100 mg  100 mg Per NG tube DAILY    lactulose (CHRONULAC) solution 20 g  20 g Per NG tube BID    morphine injection 2 mg  2 mg IntraVENous Q4H PRN    aspirin chewable tablet 81 mg  81 mg Oral QHS    polyethylene glycol (MIRALAX) packet 17 g  17 g Oral QHS    latanoprost (XALATAN) 0.005 % ophthalmic solution 1 Drop  1 Drop Both Eyes QHS    sodium chloride (NS) flush 5-10 mL  5-10 mL IntraVENous Q8H    sodium chloride (NS) flush 5-10 mL  5-10 mL IntraVENous PRN    acetaminophen (TYLENOL) tablet 650 mg  650 mg Oral Q4H PRN    Or    acetaminophen (TYLENOL) solution 650 mg  650 mg Per NG tube Q4H PRN    Or    acetaminophen (TYLENOL) suppository 650 mg  650 mg Rectal Q4H PRN    rivastigmine (EXELON) 9.5 mg/24 hr patch 1 Patch  1 Patch TransDERmal DAILY     ______________________________________________________________________  EXPECTED LENGTH OF STAY: 6d 7h  ACTUAL LENGTH OF STAY:          12                 Deberah Frizzle, MD

## 2017-12-27 NOTE — WOUND CARE
WOCN Note:       Follow up assessment of sacral ulcer.      Chart reviewed. Admitted DX: Иван Muniz  Past Medical History:  hypercholesterolemia, coronary artery disease status post CABG, Parkinson disease, prostate cancer status post prostatectomy  Admitted from home.     S/P debridement of sacral wound on 12.22.17 by Dr Ericka Borja  Patient seen with Dr Amanda Pop and Mayda Ambriz RN. Assessment:   Patient is Alert, and requires assist of 2 with repositioning.    Patient wearing briefs for incontinence and has a condom catheter.    All areas are present on admission.      Bilateral heels and buttocks skin intact and without erythema. Left lateral malleolus hyperpigmented area. Left medial knee hypopigmented area. Blanching pink erythema to left trochanter and right trochanter.      Sacral, Pressure Injury Stage 4 Present on Admission: Wound bed mix of 50% red 30% tan 20% brown with exposed bone;   Black rim along perimeter from 11-2 and 5-8. Periwound hyperpigmented non blanching purple; malodor noted; tender to touch; small serousanguinous exudate. Right knee, partial thickness wounds; 100% yellow  Left knee, partial thickness wounds; 100% yellow      Heels offloaded on prevalon boots.      Recommendations:    Prevalon boots  Upgrade bed  Turn team  Sacrum:  Daily cleanse with Dakins, apply Santyl and saline moist gauze; cover with dry and transparent dressings. Venelex to right and left trochanters twice daily  Right knee and Left knee:  Daily apply xerofom and cover with mepilex border.      Skin Care & Pressure Prevention:  Minimize layers of linen/pads under patient to optimize support surface.    Turn/reposition approximately every 2 hours and offload heels. Manage incontinence / promote continence. Specialty bed:  Sport or P500      Discussed with Dr Amanda Pop and Tresa Brody.       Transition of Care: Plan to follow as needed while admitted to hospital.  FORTUNATO MurrayN RN  Office [phone]  Pager [phone]

## 2017-12-27 NOTE — PROGRESS NOTES
Spiritual Care Assessment/Progress Notes    Wade Flores [id number]  [id number]    1940  68 y.o.  male    Patient Telephone Number: [phone] (home)   Confucianism Affiliation: [facility]   Language: English   Extended Emergency Contact Information  Primary Emergency Contact: Kirsten Katz  Address:  [place]  [address] Phone: [phone]  Mobile Phone: [phone]  Relation: Spouse  Secondary Emergency Contact: [facility]  Mobile Phone: [phone]  Relation: None   Patient Active Problem List    Diagnosis Date Noted    Metabolic encephalopathy 71/99/2685    Weakness 12/15/2017    Chest pain 11/07/2016    [facility] [place] 11/07/2016        Date: 12/27/2017       Level of Confucianism/Spiritual Activity:  [x]         Involved in elba tradition/spiritual practice (per family)    []         Not involved in elba tradition/spiritual practice  []         Spiritually oriented    []         Claims no spiritual orientation    []         seeking spiritual identity  []         Feels alienated from Bahai practice/tradition  []         Feels angry about Bahai practice/tradition  []         Spirituality/Bahai tradition  a resource for coping at this time.   []         Not able to assess due to medical condition    Services Provided Today:  []         crisis intervention    []         reading Scriptures  [x]         spiritual assessment    []         prayer  [x]         empathic listening/emotional support  []         rites and rituals (cite in comments)  []         life review     []         Bahai support  []         theological development   []         advocacy  []         ethical dialog     []         blessing  []         bereavement support    [x]         support to family  []         anticipatory grief support   []         help with AMD  []         spiritual guidance    [] meditation      Spiritual Care Needs  []         Emotional Support  [x]         Spiritual/Jehovah's witness Care  []         Loss/Adjustment  []         Advocacy/Referral                /Ethics  []         No needs expressed at               this time  []         Other: (note in               comments)  [address]  []         Follow up visits with               pt/family  []         Provide materials  []         Schedule sacraments  []         Contact Community               Clergy  [x]         Follow up as needed  []         Other: (note in               comments)     Comments: Visited with Mr. Claire Arango. Mrs. Claire Arango (pt's wife?) was present at bedside, along with her sister. No meaningful interaction occurred with pt during this visit. Explored with Mrs. Claire Arango regarding spiritual needs. Mrs. Claire Arango requested prayer - she shared that she is Orthodox and pt is Jewish.  She went on to share that pt is visited at home by members of his Confucianism community. Shared prayer and offered assurance of  support as needed. No additional needs expressed by family at this time.     Isela Giles, Palliative

## 2017-12-27 NOTE — PROGRESS NOTES
NUTRITION COMPLETE ASSESSMENT    RECOMMENDATIONS:   1. Continue tube feeding at goal and continue to discuss PEG placement    2. Continue daily weights     Interventions/Plan:   Food/Nutrient Delivery: EN support as sole source    Assessment:   Reason for Assessment:   [x]Reassessment     Diet: NPO with Tube Feeding  Tube Feeding: Jevity 1.5 @ 55 mL/hr + 150 mL flush q 4 hours    Nutritionally Significant Medications: [x] Reviewed & Includes: lactulose (BID); levaquin daily; protonix daily; miralax daily; thiamine (100 mg)    Subjective:  Pt remains on tube feeding as sole source of nutrition. Plans for PEG placement are pending. Objective:  Chart reviewed, discussed with RN and team during interdisciplinary rounds. Pt now on goal tube feeding (increased to 55 mL/hr yesterday). Will need to continue consideration for PEG feedings for nutrition support and medicine administration. Per SLP, pt presents with moderate/severe oropharyngeal dysphagia. Tube Feeding over the past 3 days (12/24-26):   12/24: 300 mL formula-- meeting 24% and 26% of estimated kcal and protein, respectively. 12/25: 780 mL formula-- meeting 63% and 67% of estimated kcal and protein needs, respectively. 12/26: 1080 mL, 1620 kcal, 69 g protein and 1721 mL total free water (tf + flush)-- 88% and 93% of estimated kcal and protein needs, respectively. Estimated Nutrition Needs:   Kcals/day: 8983 Kcals/day (1848--2006kcal)  Protein: 74 g (74-84g (1.4-1.6g/kg))  Fluid: 1900 ml (1ml/kcal)     Based On: Kcal/kg - specify (Comment) (35-38kcal/kg)  Weight Used: Actual wt (52.8kg)    Pt expected to meet estimated nutrient needs:  [x]   Yes (only via tube feeds)    Nutrition Diagnosis:   1. Malnutrition related to increased protein/energy needs as evidenced by BMI 17, severe cachexia, 63% IBW    2.  Inadequate oral intake related to dx progression, poor appetite; dysphagia as evidenced by minimal PO intake for 3 weeks PTA, plans for NGT Goals:     EN to meet at least 90% needs in 2-3 days (if consistent with goals of care)     Monitoring & Evaluation:    - Total energy intake, Enteral/parenteral nutrition intake   - Weight/weight change, Electrolyte and renal profile     Previous Nutrition Goals Met:  Progressing  Previous Recommendations:      Progressing    Education & Discharge Needs:   [x] None Identified   [] Identified and addressed    [x] Participated in care plan, discharge planning, and/or interdisciplinary rounds        Cultural, Adventist and ethnic food preferences identified:   None    Skin Integrity: []Intact  [x]Other: see pressure injury documentation  Edema: []None [x]Other: 1-2+  Last BM: 12/27/17  Food Allergies: [x]None []Other    Anthropometrics:    Weight Loss Metrics 12/26/2017 4/1/2017 11/11/2016 11/7/2016 6/28/2016 7/12/2015 2/28/2012   Today's Wt 125 lb 9.6 oz 123 lb 118 lb 118 lb 118 lb 118 lb 145 lb   BMI 16.57 kg/m2 16.23 kg/m2 15.57 kg/m2 15.57 kg/m2 15.57 kg/m2 15.57 kg/m2 19.13 kg/m2      Last 3 Recorded Weights in this Encounter    12/24/17 0027 12/25/17 0300 12/26/17 0300   Weight: 58.8 kg (129 lb 10.1 oz) 59.3 kg (130 lb 11.7 oz) 57 kg (125 lb 9.6 oz)      Weight Source: Bed  Height: 6' 1\" (185.4 cm),    Body mass index is 16.57 kg/(m^2).   IBW : 83.5 kg (184 lb), % IBW (Calculated): 63.21 %  Usual Body Weight: 55.8 kg (123 lb),      Labs:    Lab Results   Component Value Date/Time    Sodium 144 12/26/2017 05:22 AM    Potassium 3.7 12/26/2017 05:22 AM    Chloride 108 12/26/2017 05:22 AM    CO2 29 12/26/2017 05:22 AM    Glucose 113 12/26/2017 05:22 AM    BUN 11 12/26/2017 05:22 AM    Creatinine 0.69 12/26/2017 05:22 AM    Calcium 8.5 12/26/2017 05:22 AM    Magnesium 2.3 12/24/2017 03:58 AM    Phosphorus 2.4 12/27/2017 03:53 AM    Albumin 1.4 12/23/2017 02:53 AM     Lab Results   Component Value Date/Time    Hemoglobin A1c 5.6 12/16/2017 05:24 AM     Lab Results   Component Value Date/Time    Glucose 113 12/26/2017 05:22 AM    Glucose (POC) 107 12/20/2017 11:58 AM      Lab Results   Component Value Date/Time    ALT (SGPT) 11 12/23/2017 02:53 AM    AST (SGOT) 62 12/23/2017 02:53 AM    Alk.  phosphatase 86 12/23/2017 02:53 AM    Bilirubin, total 0.7 12/23/2017 02:53 AM      [address]

## 2017-12-27 NOTE — PROGRESS NOTES
Mr. Marina Parra appears comfortable. Tm 98.6 HR: 65 BP: 151/64 Resp Rate: 16 99% sat on room air. Intake/Output Summary (Last 24 hours) at 12/27/17 1531  Last data filed at 12/27/17 0905   Gross per 24 hour   Intake              650 ml   Output                0 ml   Net              650 ml   Sacral Wound - Wound is clean and granulating. There are areas of necrotic tissue. Exposed bone. No purulent drainage. Recent Results (from the past 24 hour(s))   PHOSPHORUS    Collection Time: 12/27/17  3:53 AM   Result Value Ref Range    Phosphorus 2.4 (L) 2.6 - 4.7 MG/DL   Would change wound care to Santyl. Continue enteral nutrition. Plans per Dr. Cathleen Sow. Following.

## 2017-12-27 NOTE — PROGRESS NOTES
Message left for Mrs. Rohit Tidwell concerning husbands move to room 622 on [address] Patient and belongings transferred.

## 2017-12-27 NOTE — PROGRESS NOTES
TRANSFER - IN REPORT:    Verbal report received from [address] [place] [address] RN(name) on Sherri Bloch  being received from neuro(unit) for routine progression of care      Report consisted of patients Situation, Background, Assessment and   Recommendations(SBAR). Information from the following report(s) SBAR, Kardex, Intake/Output, MAR and Recent Results was reviewed with the receiving nurse. Opportunity for questions and clarification was provided. Assessment completed upon patients arrival to unit and care assumed.        9591: Primary Nurse Filemon Wright and Carina Mejia RN performed a dual skin assessment on this patient Impairment noted-   -Bilateral knee wounds with mepilex clean dry intactr  -Red, blanchable areas noted at medial knee, BLE  -Red blanchable area to inside area of L foot at base of great toe  -Sacral wound CDI with blanchable reddened areas outside of dressing    Jose score is 12

## 2017-12-27 NOTE — PROGRESS NOTES
Problem: Falls - Risk of  Goal: *Absence of Falls  Document Ora Fall Risk and appropriate interventions in the flowsheet. Fall Risk Interventions:  Mobility Interventions: Communicate number of staff needed for ambulation/transfer    Mentation Interventions: Adequate sleep, hydration, pain control, Familiar objects from home, Family/sitter at bedside, More frequent rounding, Update white board    Medication Interventions: Evaluate medications/consider consulting pharmacy    Elimination Interventions:  Toileting schedule/hourly rounds

## 2017-12-28 LAB — PHOSPHATE SERPL-MCNC: 2.5 MG/DL (ref 2.6–4.7)

## 2017-12-28 PROCEDURE — 77030019563 HC DEV ATTCH FEED HOLL -A

## 2017-12-28 PROCEDURE — 65270000032 HC RM SEMIPRIVATE

## 2017-12-28 PROCEDURE — 74011250637 HC RX REV CODE- 250/637: Performed by: HOSPITALIST

## 2017-12-28 PROCEDURE — 74011250636 HC RX REV CODE- 250/636: Performed by: HOSPITALIST

## 2017-12-28 PROCEDURE — 74011250637 HC RX REV CODE- 250/637: Performed by: PSYCHIATRY & NEUROLOGY

## 2017-12-28 PROCEDURE — 36415 COLL VENOUS BLD VENIPUNCTURE: CPT | Performed by: SURGERY

## 2017-12-28 PROCEDURE — 77030018836 HC SOL IRR NACL ICUM -A

## 2017-12-28 PROCEDURE — 74011250637 HC RX REV CODE- 250/637: Performed by: FAMILY MEDICINE

## 2017-12-28 PROCEDURE — 77030018798 HC PMP KT ENTRL FED COVD -A

## 2017-12-28 PROCEDURE — 74011250636 HC RX REV CODE- 250/636: Performed by: INTERNAL MEDICINE

## 2017-12-28 PROCEDURE — 84100 ASSAY OF PHOSPHORUS: CPT | Performed by: SURGERY

## 2017-12-28 RX ADMIN — CARBIDOPA AND LEVODOPA 1.5 TABLET: 25; 100 TABLET ORAL at 17:38

## 2017-12-28 RX ADMIN — Medication 100 MG: at 10:42

## 2017-12-28 RX ADMIN — MORPHINE SULFATE 2 MG: 2 INJECTION, SOLUTION INTRAMUSCULAR; INTRAVENOUS at 15:35

## 2017-12-28 RX ADMIN — LEVOFLOXACIN 750 MG: 5 INJECTION, SOLUTION INTRAVENOUS at 15:07

## 2017-12-28 RX ADMIN — CASTOR OIL AND BALSAM, PERU: 788; 87 OINTMENT TOPICAL at 17:39

## 2017-12-28 RX ADMIN — PANTOPRAZOLE SODIUM 20 MG: 40 TABLET, DELAYED RELEASE ORAL at 08:09

## 2017-12-28 RX ADMIN — MORPHINE SULFATE 2 MG: 2 INJECTION, SOLUTION INTRAMUSCULAR; INTRAVENOUS at 11:30

## 2017-12-28 RX ADMIN — CARBIDOPA AND LEVODOPA 1.5 TABLET: 25; 100 TABLET ORAL at 08:09

## 2017-12-28 RX ADMIN — LATANOPROST 1 DROP: 50 SOLUTION OPHTHALMIC at 21:09

## 2017-12-28 RX ADMIN — DAKIN'S SOLUTION 0.125% (QUARTER STRENGTH): 0.12 SOLUTION at 21:10

## 2017-12-28 RX ADMIN — Medication 10 ML: at 15:07

## 2017-12-28 RX ADMIN — DAKIN'S SOLUTION 0.125% (QUARTER STRENGTH): 0.12 SOLUTION at 16:06

## 2017-12-28 RX ADMIN — DAKIN'S SOLUTION 0.125% (QUARTER STRENGTH): 0.12 SOLUTION at 06:51

## 2017-12-28 RX ADMIN — Medication 10 ML: at 21:11

## 2017-12-28 RX ADMIN — ASPIRIN 81 MG 81 MG: 81 TABLET ORAL at 20:57

## 2017-12-28 RX ADMIN — CARBIDOPA AND LEVODOPA 1.5 TABLET: 25; 100 TABLET ORAL at 20:58

## 2017-12-28 RX ADMIN — CASTOR OIL AND BALSAM, PERU: 788; 87 OINTMENT TOPICAL at 16:06

## 2017-12-28 NOTE — PROGRESS NOTES
General Surgery Daily Progress Note    Admit Date: 12/15/2017  Post-Operative Day: 6 Days Post-Op from Procedure(s):  DEBRIDEMENT OF SACRAL ULCER  REQ 1000     Subjective:     Last 24 hrs: Patient appears comfortable. Resting. Enteral feed running. Nonverbal.      Objective:     Blood pressure 140/69, pulse 69, temperature 97.9 °F (36.6 °C), resp. rate 18, height 6' 1\" (1.854 m), weight 127 lb 9.6 oz (57.9 kg), SpO2 98 %. Temp (24hrs), Av.1 °F (36.7 °C), Min:97.9 °F (36.6 °C), Max:98.3 °F (36.8 °C)      _____________________  Physical Exam:     Resting, in no acute distress. Lower extremities very contracted  Cardiovascular: RRR, no peripheral edema  Lungs:CTAB no resp distress  Wound: dressed      Assessment:   Principal Problem:    Weakness (12/15/2017)    Active Problems:    Parkinson disease ([facility] [address] (1730)      Metabolic encephalopathy ()            Plan:     Continue local wound care & dressing changes. IV antibiotics. Frequent turning. Medical management per Dr. Wilber Lopez. Data Review:    Recent Labs      17   0522   WBC  8.2   HGB  9.7*   HCT  28.9*   PLT  450*     Recent Labs      17   0340  17   0353  17   0522   NA   --    --   144   K   --    --   3.7   CL   --    --   108   CO2   --    --   29   GLU   --    --   113*   BUN   --    --   11   CREA   --    --   0.69*   CA   --    --   8.5   PHOS  2.5*  2.4*  2.8     No results for input(s): AML, LPSE in the last 72 hours.         ______________________  Medications:    Current Facility-Administered Medications   Medication Dose Route Frequency    glycopyrrolate (ROBINUL) injection 0.2 mg  0.2 mg IntraVENous Q4H PRN    levoFLOXacin (LEVAQUIN) 750 mg in D5W IVPB  750 mg IntraVENous Q24H    balsam peru-castor oil (VENELEX)  mg/gram ointment   Topical BID    sodium hypochlorite (QUARTER STRENGTH DAKIN'S) 0.125% irrigation (bottle)   Topical TID    carbidopa-levodopa (SINEMET)  mg per tablet 1.5 Tab 1.5 Tab Oral QID    pantoprazole (PROTONIX) 2 mg/mL oral suspension 20 mg  20 mg Oral ACB    lidocaine (XYLOCAINE) 2 % jelly   Mucous Membrane PRN    thiamine (B-1) tablet 100 mg  100 mg Per NG tube DAILY    lactulose (CHRONULAC) solution 20 g  20 g Per NG tube BID    morphine injection 2 mg  2 mg IntraVENous Q4H PRN    aspirin chewable tablet 81 mg  81 mg Oral QHS    polyethylene glycol (MIRALAX) packet 17 g  17 g Oral QHS    latanoprost (XALATAN) 0.005 % ophthalmic solution 1 Drop  1 Drop Both Eyes QHS    sodium chloride (NS) flush 5-10 mL  5-10 mL IntraVENous Q8H    sodium chloride (NS) flush 5-10 mL  5-10 mL IntraVENous PRN    acetaminophen (TYLENOL) tablet 650 mg  650 mg Oral Q4H PRN    Or    acetaminophen (TYLENOL) solution 650 mg  650 mg Per NG tube Q4H PRN    Or    acetaminophen (TYLENOL) suppository 650 mg  650 mg Rectal Q4H PRN    rivastigmine (EXELON) 9.5 mg/24 hr patch 1 Patch  1 Patch TransDERmal DAILY       Juanjo Oakes PA-C  12/28/2017

## 2017-12-28 NOTE — PROGRESS NOTES
Problem: Falls - Risk of  Goal: *Absence of Falls  Document Ora Fall Risk and appropriate interventions in the flowsheet. Outcome: Progressing Towards Goal  Fall Risk Interventions:  Mobility Interventions: Communicate number of staff needed for ambulation/transfer    Mentation Interventions: Adequate sleep, hydration, pain control, Door open when patient unattended, More frequent rounding, Reorient patient, Update white board    Medication Interventions: Evaluate medications/consider consulting pharmacy    Elimination Interventions:  Toileting schedule/hourly rounds

## 2017-12-28 NOTE — PROGRESS NOTES
Hospitalist Progress Note  Gregoria Myers MD  Answering service: [phone] from in house phone         Date of Service:  2017  NAME:  Rena Markham  :  1940  MRN:  [id number]    Admission Summary:      Patient admitted for weight loss and failure to thrive     Interval history / Subjective:           Pt seen at bed side with tech and with wife and children  Pt seems to be able to marifer Sinemetbut but functionally pt is with advanced wt loss, low muscle mass and stiff,    Last CXR of record reviewed by me and agree that no mass noted, but CT is definitive     :  Pt somnolent post wound check and morphine  Wife in room, discussed case  Advise that I have removed the antipsychotic as it may be making things worst for pt in setting of encephalopathy. :   A bit more alert but not clinically significant, wife elects PEG tube,  Has discussed Hospice with Palliative but not quiet ready to pull that trigger at this time this afternoon. GI consulted for peg tube placement. 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   Neurology consulted - continue asa  Speech eval - Recommends NPO for now  NG tube for medications   Dietary consult - started on tube feeding ()  Aspiration Precaution  Little progress, discussed poor prognosis with wife 2017      Paraoxysmal Afib  Now in NSR  New onset ()  Received one dose dig  On [facility]   Cardiology Consulted  Echo showing EF of 65%     Unintentional Weight loss  CT chest showing possible lung nodule/mass - consulted pulmonary  Lung biopsy - Ct guided on , however Evaluated by radiology and doubt this is a true lung tumor and recommends follow up ct in 6 months  Pulmonary following and defer investigations  For PET CT , ordered 12/26/2017      Failure to thrive and dysphagia with Severe protein calorie nutrition  NG for feeding and medication admistration   Palliative consulted to discuss with family regarding goals of care if family wishes to proceed with peg tube - for now the wife would like to hold off for now  Family wants to give a chance for parkinson medications to work before deciding on peg     UTI  On levaquin since 12/121  Ucx  Pseudomonas and E coli  Change antibiotics to Aztreonam.  WBC 9.8  But both pseudomonas and e. Coli sense to Levaquin, back on Levaquin to complete a 2 week course of abx. , case discussed with Pharmacy.          Sacral ulcer - unstageable on admission  Sacral, Pressure Injury Unstageable Present on Admission:  7.5 x 5.2 x 0.4 cm 100% soft eschar with 10% pink noted around perimeter.  Periwound hyperpigmented and non blanching; malodor noted; tender to touch; small serous exudate. Wound care consulted  Surgery consulted. Sacral ulcer debridement was done 39/91.     Metabolic encephalopathy from Hypernatremia on admission   Although hypernatremia resolved,mentation not much improved likely due to multiple medical conditions such as advanced Parkinson's disease,dementia,infection,brain infarct. MRI brain showing old pontine infarct  Continue to monitor mentation,doubt will much improve continues, , 12/28/2017 have taken antipsychotic off MAR 2n2 concerns of confusion, EPSE          Hypernatremia  Resolved  Could be related to reset osmostat in mineralocorticoid use. poor po intake  Hold Florinef  Dextrose IVF  Appreciate renal input  Recent Labs      12/26/17   0522   NA  144          Code status: Full  DVT prophylaxis: SCD     Care Plan discussed with: Patient/Family  Disposition: TBD      12/16 - d/w patients wife and updated on plan, She is concerned about his Parkinson medication, I have called pharmacy and verified its not here.  RN will call ED to see if the medication is there, she also reports that he is not on sinemet anymore as he had intolerance to it.   12/17 - updated wife at bedside on plan of care  12/18 - NG placed by nursing however placement was not confirmed, removed  12/19 - Ng placed by IR - Feeding orders given   12/20-  Went into afib new onset. Rate controlled. palliative discussed with patients wife and she wants to leave as full code and all measures done. 12/21 - NSR on monitor. Plan of care discussed with aptients family and they would want full code. 12/22 - Wife in the room with sister and daughter. Plan of care discussed. 12/26  - wife and daughters in room discussed course,   Made family aware of planned PetCT.           Hospital Problems  Date Reviewed: 12/15/2017          Codes Class Noted POA    Metabolic encephalopathy TIL-52-AF: G93.41  ICD-9-CM: 348.31  12/21/2017 Unknown        * (Principal)Weakness ICD-10-CM: R53.1  ICD-9-CM: 780.79  12/15/2017 Yes        Parkinson disease ([facility] [address] (Chronic) ICD-10-CM: G20  ICD-9-CM: 332.0  11/7/2016 Yes                Review of Systems:   Review of systems not obtained due to patient factors. Vital Signs:    Last 24hrs VS reviewed since prior progress note. Most recent are:  Visit Vitals    /54    Pulse 72    Temp 98.1 °F (36.7 °C)    Resp 18    Ht 6' 1\" (1.854 m)    Wt 57.9 kg (127 lb 9.6 oz)    SpO2 99%    BMI 16.83 kg/m2         Intake/Output Summary (Last 24 hours) at 12/28/17 1554  Last data filed at 12/28/17 1408   Gross per 24 hour   Intake              700 ml   Output             1650 ml   Net             -950 ml        Physical Examination:             Constitutional:  No acute distress,     ENT:  Oral mucous dry - mouth breaths.;  oropharynx w. o mass lesions Neck supple,    Resp:  CTA bilaterally. No wheezing/rhonchi/rales. No accessory muscle use   CV:  Regular rhythm, normal rate, no murmurs, gallops, rubs    GI:  Soft, non distended, non tender.  normoactive bowel sounds, no hepatosplenomegaly Musculoskeletal:  No edema, warm,generalized muscle loss. Neurologic:  non responsive to moving ext on encouragement, non verlba . Psych:  non responsive, non verbal  no agitated not apparently anxious. Data Review:    Review and/or order of clinical lab test      Labs:     Recent Labs      12/26/17   0522   WBC  8.2   HGB  9.7*   HCT  28.9*   PLT  450*     Recent Labs      12/28/17   0340  12/27/17   0353  12/26/17   0522   NA   --    --   144   K   --    --   3.7   CL   --    --   108   CO2   --    --   29   BUN   --    --   11   CREA   --    --   0.69*   GLU   --    --   113*   CA   --    --   8.5   PHOS  2.5*  2.4*  2.8     No results for input(s): SGOT, GPT, ALT, AP, TBIL, TBILI, TP, ALB, GLOB, GGT, AML, LPSE in the last 72 hours. No lab exists for component: AMYP, HLPSE  No results for input(s): INR, PTP, APTT in the last 72 hours. No lab exists for component: INREXT, INREXT   No results for input(s): FE, TIBC, PSAT, FERR in the last 72 hours. No results found for: FOL, RBCF   No results for input(s): PH, PCO2, PO2 in the last 72 hours. No results for input(s): CPK, CKNDX, TROIQ in the last 72 hours.     No lab exists for component: CPKMB  Lab Results   Component Value Date/Time    Cholesterol, total 106 12/16/2017 05:15 AM    HDL Cholesterol 26 12/16/2017 05:15 AM    LDL, calculated 58.4 12/16/2017 05:15 AM    Triglyceride 108 12/16/2017 05:15 AM    CHOL/HDL Ratio 4.1 12/16/2017 05:15 AM     Lab Results   Component Value Date/Time    Glucose (POC) 107 12/20/2017 11:58 AM    Glucose (POC) 91 11/27/2009 12:20 PM     Lab Results   Component Value Date/Time    Color YELLOW/STRAW 12/21/2017 12:00 AM    Appearance CLOUDY 12/21/2017 12:00 AM    Specific gravity 1.009 12/21/2017 12:00 AM    pH (UA) 6.0 12/21/2017 12:00 AM    Protein NEGATIVE  12/21/2017 12:00 AM    Glucose NEGATIVE  12/21/2017 12:00 AM    Ketone NEGATIVE  12/21/2017 12:00 AM    Bilirubin NEGATIVE  12/21/2017 12:00 AM Urobilinogen 1.0 12/21/2017 12:00 AM    Nitrites NEGATIVE  12/21/2017 12:00 AM    Leukocyte Esterase MODERATE 12/21/2017 12:00 AM    Epithelial cells FEW 12/21/2017 12:00 AM    Bacteria 4+ 12/21/2017 12:00 AM    WBC 10-20 12/21/2017 12:00 AM    RBC 10-20 12/21/2017 12:00 AM         Medications Reviewed:     Current Facility-Administered Medications   Medication Dose Route Frequency    glycopyrrolate (ROBINUL) injection 0.2 mg  0.2 mg IntraVENous Q4H PRN    levoFLOXacin (LEVAQUIN) 750 mg in D5W IVPB  750 mg IntraVENous Q24H    balsam peru-castor oil (VENELEX)  mg/gram ointment   Topical BID    sodium hypochlorite (QUARTER STRENGTH DAKIN'S) 0.125% irrigation (bottle)   Topical TID    carbidopa-levodopa (SINEMET)  mg per tablet 1.5 Tab  1.5 Tab Oral QID    pantoprazole (PROTONIX) 2 mg/mL oral suspension 20 mg  20 mg Oral ACB    lidocaine (XYLOCAINE) 2 % jelly   Mucous Membrane PRN    thiamine (B-1) tablet 100 mg  100 mg Per NG tube DAILY    lactulose (CHRONULAC) solution 20 g  20 g Per NG tube BID    morphine injection 2 mg  2 mg IntraVENous Q4H PRN    aspirin chewable tablet 81 mg  81 mg Oral QHS    polyethylene glycol (MIRALAX) packet 17 g  17 g Oral QHS    latanoprost (XALATAN) 0.005 % ophthalmic solution 1 Drop  1 Drop Both Eyes QHS    sodium chloride (NS) flush 5-10 mL  5-10 mL IntraVENous Q8H    sodium chloride (NS) flush 5-10 mL  5-10 mL IntraVENous PRN    acetaminophen (TYLENOL) tablet 650 mg  650 mg Oral Q4H PRN    Or    acetaminophen (TYLENOL) solution 650 mg  650 mg Per NG tube Q4H PRN    Or    acetaminophen (TYLENOL) suppository 650 mg  650 mg Rectal Q4H PRN    rivastigmine (EXELON) 9.5 mg/24 hr patch 1 Patch  1 Patch TransDERmal DAILY     ______________________________________________________________________  EXPECTED LENGTH OF STAY: 6d 7h  ACTUAL LENGTH OF STAY:          13                 Eliseo Mcneill MD

## 2017-12-28 NOTE — PROGRESS NOTES
CM talked to pt's wife, Asha Gonzalez, ([phone]) about other choices for SNF placement. [facility] declined. Mrs Isabela Fernandez will talk with other family and get back with CM. Katie Gandara RN  [employer]

## 2017-12-28 NOTE — PROGRESS NOTES
Bedside and Verbal shift change report given to BALJIT Colbert (oncoming nurse) by Tiffany Darden RN (offgoing nurse). Report included the following information SBAR, Kardex, MAR, Accordion and Recent Results.

## 2017-12-28 NOTE — PROGRESS NOTES
Received request from colleague Ms. Magana to send referral to [facility].  sent referral to [facility] via [facility]. Outcome pending.     JAVED Fung

## 2017-12-28 NOTE — PROGRESS NOTES
Palliative Medicine Consult  Gianni: [phone]    Patient Name: Cory Myers  YOB: 1940    Date of Initial Consult: December 18, 2017  Reason for Consult: Care Decisions  Requesting Provider: Dr. Cam Alcantara  Primary Care Physician: José Manuel Gaston MD     SUMMARY:   Cory Myers is a 68 y.o. with a past history of CAD, Parkinson's Disease, hypercholesterolemia, osteoporosis, prostate cancer s/p prostatectomy who was admitted on 12/15/2017 from home with a diagnosis of hypernatremia, increasing weakness, unintentional wt loss. Admission complicated by left lower lobe lung mass concerning for neoplasia but CT images reveal more of a rounded pneumonia per pulmonary notes. Dysphagia also noted. Pt scheduled for NG tube placed by IR 12/18/17. Current medical issues leading to Palliative Medicine involvement include: care goals. Social: , 2 children, 1 grand child,     Interval History:   NGT placed  wound debridement 12/22/17  Parkinson's meds started 12/21/17     PALLIATIVE DIAGNOSES:   1. Dysphagia  2. Cachexia  3. Rigidity~ improved  4. Dysphonia   5. Physical debility  6. Parkinson's Disease  7. Pressure Ulcer  8. Bed bound status     PLAN:   1. Met with wife again today  2. Followed up on previous discussion about hospice care and disposition  3. Wife after reflecting is now electing skilled care at [facility] because [facility] is not an option  4. Explained that skilled care and hospice can not be provided at the same time. Explained that once the pt is back home only then can he enroll in hospice care. 5. Wife understands that the pt will need peg placement. Able to answer questions about feeding the pt orally with the peg in place. Offered my opinion that the pt will continue to be at high risk for aspiration even with the best of therapy. 6. Wife very emotional.  She would like to speak to Dr. Jailene Chaparro more about peg placement  7.  Pt remains at high risk for readmission from the skilled facility    8. Robinul ordered for secretions  9. Will continue to follow    10. Goals: skilled nursing and peg placement     11. Initial consult note routed to primary continuity provider  12. Communicated plan of care with: Palliative IDT       GOALS OF CARE / TREATMENT PREFERENCES:     GOALS OF CARE: Continue current level of care. Full Code status. Return home upon dc. Patient/Health Care Proxy Stated Goals: Prolong life      TREATMENT PREFERENCES:   Code Status: Full Code    Advance Care Planning:  Advance Care Planning 12/20/2017   Patient's Healthcare Decision Maker is: Legal Next of Alex 69   Primary Decision Maker Name Mrs. Silviano Lee Decision Maker Phone Number [phone]   Primary Decision Maker Relationship to Patient Spouse   Confirm Advance Directive None   Patient Would Like to Complete Advance Directive Unable       Medical Interventions: Full interventions   Other Instructions:   Artificially Administered Nutrition: Feeding tube long-term, if indicated     Other:    As far as possible, the palliative care team has discussed with patient / health care proxy about goals of care / treatment preferences for patient. HISTORY:     HPI/SUBJECTIVE:    The patient is:   [] Verbal and participatory  [x] Non-participatory due to:   Medical condition.   Able to deny pain     Clinical Pain Assessment (nonverbal scale for severity on nonverbal patients):   Clinical Pain Assessment  Severity: 0     Activity (Movement): Lying quietly, normal position    Duration: for how long has pt been experiencing pain (e.g., 2 days, 1 month, years)  Frequency: how often pain is an issue (e.g., several times per day, once every few days, constant)     FUNCTIONAL ASSESSMENT:     Palliative Performance Scale (PPS):  PPS: 30       PSYCHOSOCIAL/SPIRITUAL SCREENING:     Palliative IDT has assessed this patient for cultural preferences / practices and a referral made as appropriate to needs (Cultural Services, Patient Advocacy, Ethics, etc.)    Advance Care Planning:  Advance Care Planning 12/20/2017   Patient's Healthcare Decision Maker is: Legal Next of Alex Bryant   Primary Decision Maker Name Mrs. Stevan Savage Decision Maker Phone Number [phone]   Primary Decision Maker Relationship to Patient Spouse   Confirm Advance Directive None   Patient Would Like to Complete Advance Directive Unable       Any spiritual / Gnosticism concerns:  [] Yes /  [x] No    Caregiver Burnout:  [] Yes /  [x] No /  [] No Caregiver Present      Anticipatory grief assessment:   [x] Normal  / [] Maladaptive       ESAS Anxiety: Anxiety: 0    ESAS Depression:          REVIEW OF SYSTEMS:     Positive and pertinent negative findings in ROS are noted above in HPI. The following systems were [x] reviewed objectively / [] unable to be reviewed as noted in HPI  Other findings are noted below. Systems: constitutional, ears/nose/mouth/throat, respiratory, gastrointestinal, genitourinary, musculoskeletal, integumentary, neurologic, psychiatric, endocrine. Positive findings noted below. Modified ESAS Completed by: provider   Fatigue: 10 Drowsiness: 0     Pain: 0   Anxiety: 0 Nausea: 0   Anorexia: 10 Dyspnea: 0           Stool Occurrence(s): 1        PHYSICAL EXAM:     From RN flowsheet:  Wt Readings from Last 3 Encounters:   12/28/17 127 lb 9.6 oz (57.9 kg)   04/01/17 123 lb (55.8 kg)   11/11/16 118 lb (53.5 kg)     Blood pressure 130/54, pulse 72, temperature 98.1 °F (36.7 °C), resp. rate 18, height 6' 1\" (1.854 m), weight 127 lb 9.6 oz (57.9 kg), SpO2 99 %.     Pain Scale 1: Adult Nonverbal Pain Scale  Pain Intensity 1: 0     Pain Location 1: Coccyx  Pain Orientation 1: Posterior  Pain Description 1: Aching, Sharp  Pain Intervention(s) 1: Repositioned  Last bowel movement, if known:     Constitutional: frail, cachectic, nad  Eyes: pupils equal, anicteric  ENMT: no nasal discharge, moist mucous membranes, +NGT  Cardiovascular: regular rhythm, distal pulses intact  Respiratory: breathing not labored, symmetric  Gastrointestinal: soft non-tender, +bowel sounds  Musculoskeletal: no deformity, no tenderness to palpation  Skin: warm, dry  Neurologic: following no commands, Parkinson's disease  Psychiatric:   Other:       HISTORY:     Principal Problem:    Weakness (12/15/2017)    Active Problems:    Parkinson disease ([facility] (93/4/1267)      Metabolic encephalopathy (36/67/0111)      Past Medical History:   Diagnosis Date    CAD (coronary artery disease)     Hypercholesteremia     Osteoporosis     Parkinson disease ([facility]       Past Surgical History:   Procedure Laterality Date    CARDIAC SURG PROCEDURE UNLIST      bipass x 2    HX PROSTATECTOMY        History reviewed. No pertinent family history. History reviewed, no pertinent family history.   Social History   Substance Use Topics    Smoking status: Former Smoker    Smokeless tobacco: Not on file    Alcohol use No     No Known Allergies   Current Facility-Administered Medications   Medication Dose Route Frequency    glycopyrrolate (ROBINUL) injection 0.2 mg  0.2 mg IntraVENous Q4H PRN    levoFLOXacin (LEVAQUIN) 750 mg in D5W IVPB  750 mg IntraVENous Q24H    balsam peru-castor oil (VENELEX)  mg/gram ointment   Topical BID    sodium hypochlorite (QUARTER STRENGTH DAKIN'S) 0.125% irrigation (bottle)   Topical TID    carbidopa-levodopa (SINEMET)  mg per tablet 1.5 Tab  1.5 Tab Oral QID    pantoprazole (PROTONIX) 2 mg/mL oral suspension 20 mg  20 mg Oral ACB    lidocaine (XYLOCAINE) 2 % jelly   Mucous Membrane PRN    thiamine (B-1) tablet 100 mg  100 mg Per NG tube DAILY    lactulose (CHRONULAC) solution 20 g  20 g Per NG tube BID    morphine injection 2 mg  2 mg IntraVENous Q4H PRN    aspirin chewable tablet 81 mg  81 mg Oral QHS    polyethylene glycol (MIRALAX) packet 17 g  17 g Oral QHS    latanoprost (XALATAN) 0.005 % ophthalmic solution 1 Drop  1 Drop Both Eyes QHS    sodium chloride (NS) flush 5-10 mL  5-10 mL IntraVENous Q8H    sodium chloride (NS) flush 5-10 mL  5-10 mL IntraVENous PRN    acetaminophen (TYLENOL) tablet 650 mg  650 mg Oral Q4H PRN    Or    acetaminophen (TYLENOL) solution 650 mg  650 mg Per NG tube Q4H PRN    Or    acetaminophen (TYLENOL) suppository 650 mg  650 mg Rectal Q4H PRN    rivastigmine (EXELON) 9.5 mg/24 hr patch 1 Patch  1 Patch TransDERmal DAILY          LAB AND IMAGING FINDINGS:     Lab Results   Component Value Date/Time    WBC 8.2 12/26/2017 05:22 AM    HGB 9.7 12/26/2017 05:22 AM    PLATELET 074 85/19/4550 05:22 AM     Lab Results   Component Value Date/Time    Sodium 144 12/26/2017 05:22 AM    Potassium 3.7 12/26/2017 05:22 AM    Chloride 108 12/26/2017 05:22 AM    CO2 29 12/26/2017 05:22 AM    BUN 11 12/26/2017 05:22 AM    Creatinine 0.69 12/26/2017 05:22 AM    Calcium 8.5 12/26/2017 05:22 AM    Magnesium 2.3 12/24/2017 03:58 AM    Phosphorus 2.5 12/28/2017 03:40 AM      Lab Results   Component Value Date/Time    AST (SGOT) 62 12/23/2017 02:53 AM    Alk. phosphatase 86 12/23/2017 02:53 AM    Protein, total 5.8 12/23/2017 02:53 AM    Albumin 1.4 12/23/2017 02:53 AM    Globulin 4.4 12/23/2017 02:53 AM     Lab Results   Component Value Date/Time    INR 1.1 07/12/2015 12:15 PM    Prothrombin time 10.8 07/12/2015 12:15 PM    aPTT 27.0 07/12/2015 12:15 PM      No results found for: IRON, FE, TIBC, IBCT, PSAT, FERR   No results found for: PH, PCO2, PO2  No components found for: [place]   Lab Results   Component Value Date/Time     12/21/2017 02:27 PM                Total time: 25 minutes  Counseling / coordination time, spent as noted above: 20 minutes  > 50% counseling / coordination?: y    Prolonged service was provided for  []30 min   []75 min in face to face time in the presence of the patient, spent as noted above. Time Start:   Time End:   Note: this can only be billed with 26947 (initial) or 76063 (follow up).   If multiple start / stop times, list each separately.

## 2017-12-28 NOTE — PROGRESS NOTES
Bedside shift change report given to Milan Santiago RN (oncoming nurse) by Marisol Ruiz RN (offgoing nurse). Report included the following information SBAR.

## 2017-12-29 ENCOUNTER — APPOINTMENT (OUTPATIENT)
Dept: GENERAL RADIOLOGY | Age: 77
DRG: 981 | End: 2017-12-29
Attending: INTERNAL MEDICINE
Payer: MEDICARE

## 2017-12-29 LAB
ANION GAP SERPL CALC-SCNC: 9 MMOL/L (ref 5–15)
BASOPHILS # BLD: 0 K/UL (ref 0–0.1)
BASOPHILS NFR BLD: 0 % (ref 0–1)
BUN SERPL-MCNC: 12 MG/DL (ref 6–20)
BUN/CREAT SERPL: 18 (ref 12–20)
CALCIUM SERPL-MCNC: 8.2 MG/DL (ref 8.5–10.1)
CHLORIDE SERPL-SCNC: 105 MMOL/L (ref 97–108)
CO2 SERPL-SCNC: 23 MMOL/L (ref 21–32)
CREAT SERPL-MCNC: 0.67 MG/DL (ref 0.7–1.3)
DIFFERENTIAL METHOD BLD: ABNORMAL
EOSINOPHIL # BLD: 0 K/UL (ref 0–0.4)
EOSINOPHIL NFR BLD: 0 % (ref 0–7)
ERYTHROCYTE [DISTWIDTH] IN BLOOD BY AUTOMATED COUNT: 15 % (ref 11.5–14.5)
GLUCOSE SERPL-MCNC: 98 MG/DL (ref 65–100)
HCT VFR BLD AUTO: 29.3 % (ref 36.6–50.3)
HGB BLD-MCNC: 9.6 G/DL (ref 12.1–17)
LYMPHOCYTES # BLD: 0.5 K/UL (ref 0.8–3.5)
LYMPHOCYTES NFR BLD: 5 % (ref 12–49)
MCH RBC QN AUTO: 30.4 PG (ref 26–34)
MCHC RBC AUTO-ENTMCNC: 32.8 G/DL (ref 30–36.5)
MCV RBC AUTO: 92.7 FL (ref 80–99)
MONOCYTES # BLD: 0.1 K/UL (ref 0–1)
MONOCYTES NFR BLD: 1 % (ref 5–13)
NEUTS SEG # BLD: 9.9 K/UL (ref 1.8–8)
NEUTS SEG NFR BLD: 94 % (ref 32–75)
PHOSPHATE SERPL-MCNC: 2.7 MG/DL (ref 2.6–4.7)
PLATELET # BLD AUTO: 555 K/UL (ref 150–400)
PLATELET COMMENTS,PCOM: ABNORMAL
POTASSIUM SERPL-SCNC: 4.7 MMOL/L (ref 3.5–5.1)
RBC # BLD AUTO: 3.16 M/UL (ref 4.1–5.7)
RBC MORPH BLD: ABNORMAL
RBC MORPH BLD: ABNORMAL
SODIUM SERPL-SCNC: 137 MMOL/L (ref 136–145)
WBC # BLD AUTO: 10.5 K/UL (ref 4.1–11.1)

## 2017-12-29 PROCEDURE — 80048 BASIC METABOLIC PNL TOTAL CA: CPT | Performed by: INTERNAL MEDICINE

## 2017-12-29 PROCEDURE — 74011000250 HC RX REV CODE- 250: Performed by: SURGERY

## 2017-12-29 PROCEDURE — 97110 THERAPEUTIC EXERCISES: CPT

## 2017-12-29 PROCEDURE — 74011250637 HC RX REV CODE- 250/637: Performed by: INTERNAL MEDICINE

## 2017-12-29 PROCEDURE — 74011250637 HC RX REV CODE- 250/637: Performed by: PSYCHIATRY & NEUROLOGY

## 2017-12-29 PROCEDURE — 85025 COMPLETE CBC W/AUTO DIFF WBC: CPT | Performed by: INTERNAL MEDICINE

## 2017-12-29 PROCEDURE — 65270000032 HC RM SEMIPRIVATE

## 2017-12-29 PROCEDURE — 36415 COLL VENOUS BLD VENIPUNCTURE: CPT | Performed by: INTERNAL MEDICINE

## 2017-12-29 PROCEDURE — 84100 ASSAY OF PHOSPHORUS: CPT | Performed by: INTERNAL MEDICINE

## 2017-12-29 PROCEDURE — 74011250637 HC RX REV CODE- 250/637: Performed by: FAMILY MEDICINE

## 2017-12-29 PROCEDURE — 74000 XR ABD PORT  1 V: CPT

## 2017-12-29 PROCEDURE — 74011250636 HC RX REV CODE- 250/636: Performed by: INTERNAL MEDICINE

## 2017-12-29 RX ADMIN — LEVOFLOXACIN 750 MG: 5 INJECTION, SOLUTION INTRAVENOUS at 15:08

## 2017-12-29 RX ADMIN — CASTOR OIL AND BALSAM, PERU: 788; 87 OINTMENT TOPICAL at 11:30

## 2017-12-29 RX ADMIN — CASTOR OIL AND BALSAM, PERU: 788; 87 OINTMENT TOPICAL at 18:00

## 2017-12-29 RX ADMIN — DAKIN'S SOLUTION 0.125% (QUARTER STRENGTH): 0.12 SOLUTION at 11:30

## 2017-12-29 RX ADMIN — CARBIDOPA AND LEVODOPA 1.5 TABLET: 25; 100 TABLET ORAL at 19:15

## 2017-12-29 RX ADMIN — Medication 10 ML: at 15:09

## 2017-12-29 RX ADMIN — COLLAGENASE SANTYL: 250 OINTMENT TOPICAL at 11:30

## 2017-12-29 NOTE — CONSULTS
[address] NOTE  [facility] office  [phone] NP in-hospital cell phone M-F until 4:30  After 5pm or on weekends, please call  for physician on call        NAME:  Jannet Pascual   :   1940   MRN:   [id number]        Referring Physician: James Burciaga    Consult Date: 2017 10:33 AM    Chief Complaint: PEG tube     History of Present Illness:  Patient is a 68 y.o. who is seen in consultation at the request of Dr. Olu Handley for PEG tube. Medical history as listed below includes parkinson disease, prostate cancer (status post prostatectomy), and coronary artery disease. Patient is a non-historian, no family at bedside. He was admitted for weight loss and failure to thrive. Nursing reports the patient had a gradual decline over the past three weeks prior to admission. Patient's wife reports that  the patient had an acute decline with muscle weakness; was evaluated at [facility]. Wife reports patient had a good appetite and good intake. However, he has significant contractures and large pressure ulcer. 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 He was evaluated by speech who recommend NPO with consideration of PEG placement. He has been on tube feeds since 17 without significant progress. CT scan with concern for pulmonary malignancy, radiology recommended CT in 6 months. A PET scan is pending to evaluate for malignancy given unintentional weight loss. Colonoscopy  for hematochezia with internal hemorrhoids    I have reviewed the emergency room note, hospital admission note, notes by all other clinicians who have seen the patient during this hospitalization to date. I have reviewed the problem list and the reason for this hospitalization.  I have reviewed the allergies and the medications the patient was taking at home prior to this hospitalization. PMH:  Past Medical History:   Diagnosis Date    CAD (coronary artery disease)     Hypercholesteremia     Osteoporosis     Parkinson disease ([address]        PSH:  Past Surgical History:   Procedure Laterality Date    CARDIAC SURG PROCEDURE UNLIST      bipass x 2    HX PROSTATECTOMY         Allergies:  No Known Allergies    Home Medications:  Prior to Admission Medications   Prescriptions Last Dose Informant Patient Reported? Taking? Calcium-Cholecalciferol, D3, 600 mg(1,500mg) -400 unit cap 2017 at Unknown time  Yes Yes   Sig: Take 1 Cap by mouth two (2) times a day. MULTIVIT-MIN/FA/LYCOPEN/LUTEIN (CENTRUM SILVER ULTRA MEN'S PO) 2017 at Unknown time  Yes Yes   Sig: Take 1 Tab by mouth daily. acetaminophen (TYLENOL) 160 mg/5 mL liquid   Yes Yes   Sig: Take  by mouth every six (6) hours as needed for Pain. alendronate (FOSAMAX) 70 mg tablet 12/10/2017  Yes Yes   Sig: Take 70 mg by mouth Every Saturday. aspirin 81 mg tablet   Yes Yes   Sig: Take 81 mg by mouth nightly. fludrocortisone (FLORINEF) 0.1 mg tablet 12/15/2017 at am  Yes Yes   Sig: Take 0.2 mg by mouth daily. latanoprost (XALATAN) 0.005 % ophthalmic solution 2017 at Unknown time  Yes Yes   Sig: Administer 1 Drop to both eyes nightly. pantoprazole (PROTONIX) 20 mg tablet 12/15/2017 at Unknown time  Yes Yes   Sig: Take 20 mg by mouth daily. pimavanserin (NUPLAZID) 17 mg tab 2017 at Unknown time  Yes Yes   Sig: Take 2 Tabs by mouth daily. polyethylene glycol (MIRALAX) 17 gram/dose powder   Yes Yes   Sig: Take 17 g by mouth nightly. rivastigmine (EXELON) 9.5 mg/24 hr patch 2017 at Unknown time  Yes Yes   Si Patch by TransDERmal route daily.  Applies at 1700      Facility-Administered Medications: None       Hospital Medications:  Current Facility-Administered Medications   Medication Dose Route Frequency    collagenase (SANTYL) 250 unit/gram ointment   Topical DAILY    glycopyrrolate (ROBINUL) injection 0.2 mg  0.2 mg IntraVENous Q4H PRN    levoFLOXacin (LEVAQUIN) 750 mg in D5W IVPB  750 mg IntraVENous Q24H    balsam peru-castor oil (VENELEX)  mg/gram ointment   Topical BID    sodium hypochlorite (QUARTER STRENGTH DAKIN'S) 0.125% irrigation (bottle)   Topical TID    carbidopa-levodopa (SINEMET)  mg per tablet 1.5 Tab  1.5 Tab Oral QID    pantoprazole (PROTONIX) 2 mg/mL oral suspension 20 mg  20 mg Oral ACB    lidocaine (XYLOCAINE) 2 % jelly   Mucous Membrane PRN    thiamine (B-1) tablet 100 mg  100 mg Per NG tube DAILY    morphine injection 2 mg  2 mg IntraVENous Q4H PRN    aspirin chewable tablet 81 mg  81 mg Oral QHS    polyethylene glycol (MIRALAX) packet 17 g  17 g Oral QHS    latanoprost (XALATAN) 0.005 % ophthalmic solution 1 Drop  1 Drop Both Eyes QHS    sodium chloride (NS) flush 5-10 mL  5-10 mL IntraVENous Q8H    sodium chloride (NS) flush 5-10 mL  5-10 mL IntraVENous PRN    acetaminophen (TYLENOL) tablet 650 mg  650 mg Oral Q4H PRN    Or    acetaminophen (TYLENOL) solution 650 mg  650 mg Per NG tube Q4H PRN    Or    acetaminophen (TYLENOL) suppository 650 mg  650 mg Rectal Q4H PRN    rivastigmine (EXELON) 9.5 mg/24 hr patch 1 Patch  1 Patch TransDERmal DAILY       Social History:  Social History   Substance Use Topics    Smoking status: Former Smoker    Smokeless tobacco: Not on file    Alcohol use No       Family History:  History reviewed. No pertinent family history.     Review of Systems:  Unable to obtain due to patient condition     Objective:   Patient Vitals for the past 8 hrs:   BP Temp Pulse Resp SpO2   12/29/17 0914 143/66 97.7 °F (36.5 °C) - 18 98 %        12/27 1901 - 12/29 0700  In: 1245   Out: 6951 [Urine:1650]    EXAM:     CONST:  Male lying in bed, no acute distress   NEURO:  Drowsy, nonverbal    HEENT: Some EOMI, no scleral icterus   LUNGS: clear to ausculation, (-) wheeze   CARD:  regular rate and rhythm, S1 S2   ABD:  taught, grimaces with palpation, scaphoid, well healed surgical scar, no rebound, bowel sounds (+) all 4 quadrants, no masses, non distended   EXT:  no edema, warm   PSYCH: Unable to assess     Data Review     Recent Labs      12/29/17   0555   WBC  10.5   HGB  9.6*   HCT  29.3*   PLT  555*     Recent Labs      12/29/17   0555  12/28/17   0340   NA  137   --    K  4.7   --    CL  105   --    CO2  23   --    BUN  12   --    CREA  0.67*   --    GLU  98   --    PHOS  2.7  2.5*   CA  8.2*   --      No results for input(s): SGOT, GPT, AP, TBIL, TP, ALB, GLOB, GGT, AML, LPSE in the last 72 hours. No lab exists for component: AMYP, HLPSE  No results for input(s): INR, PTP, APTT in the last 72 hours. No lab exists for component: INREXT       Assessment:   · Moderate/severe oropharyngeal dysphagia: speech recommends NPO with consideration for PEG placement  · Failure to thrive: severe protein calorie malnutrition; tolerating tube feeds since 12/181/17      Patient Active Problem List   Diagnosis Code    Chest pain R07.9    Parkinson disease ([facility] [address] G20    Weakness Z80.6    Metabolic encephalopathy L64.39     Plan:     · Continue tube feeds  · Monitor over the weekend, if no improvement will consider PEG placement early next week  · Discussed PEG placement risks with wife, she is agreeable if a PEG is necessary   · Thank you for allowing me to participate in care of Yolanda Araiza By: Tom Cantu NP     12/29/2017  10:33 AM       I have examined the patient. I have reviewed the chart and agree with the documentation recorded by the NP, including the assessment, treatment plan, and disposition.     Discussed risks and benefits of PEG tube placement with family, they will be thinking about it  To reassess swallowing early next week then decide on PEG tube placement    Nighat Duvall MD

## 2017-12-29 NOTE — PROGRESS NOTES
NUTRITION       Chart reviewed, discussed with RN and team during interdisciplinary rounds. Tube feedings were discontinued this morning for PEG placement; however, per discussed with GI and family, plan is to continue NGT over the weekend and reassess for long term PEG placement and nutrition support. Will resume enteral feedings. Tube Feeding: Jevity 1.5 @ 55 mL/hr + 150 mL flush q 4 hours  -- this provides 1980 kcal, 84 g protein, 1903 mL total free water (tf + flush)-- meeting 100% of estimated needs. Last 3 Recorded Weights in this Encounter    12/25/17 0300 12/26/17 0300 12/28/17 0412   Weight: 59.3 kg (130 lb 11.7 oz) 57 kg (125 lb 9.6 oz) 57.9 kg (127 lb 9.6 oz)     Estimated Nutrition Needs:   Kcals/day: 6226 Kcals/day (1848--2006kcal)  Protein: 74 g (74-84g (1.4-1.6g/kg))  Fluid: 1900 ml (1ml/kcal)     Based On: Kcal/kg - specify (Comment) (35-38kcal/kg)  Weight Used: Actual wt (52.8kg)     RD to follow.     [address]

## 2017-12-29 NOTE — PROGRESS NOTES
Hospitalist Progress Note  Gregoria Myers MD  Answering service: [phone] from in house phone         Date of Service:  2017  NAME:  Rena Markham  :  1940  MRN:  [id number]    Admission Summary:      Patient admitted for weight loss and failure to thrive     Interval history / Subjective:           Pt seen at bed side with tech and with wife and children  Pt seems to be able to marifer Sinemetbut but functionally pt is with advanced wt loss, low muscle mass and stiff,    Last CXR of record reviewed by me and agree that no mass noted, but CT is definitive     :  Pt somnolent post wound check and morphine  Wife in room, discussed case  Advise that I have removed the antipsychotic as it may be making things worst for pt in setting of encephalopathy. :   A bit more alert but not clinically significant, wife elects PEG tube,  Has discussed Hospice with Palliative but not quiet ready to pull that trigger at this time this afternoon. GI consulted for peg tube placement. :   No real significant improvement in mental/motor functions. Pt prostrae in bed, ng tube in place, and position documented, on tube feedings,  Seen by GI and will possibly be set up for Peg early next week,   Wife, daughter/friend in room as well as RN on rounds, case discussed with CM      Assessment & Plan:      Weakness - generalized  Multifactorial - deconditioning,failure to thrive,severe debility  tsh and Cortisol levels wnl  MRI - old pontine infarct,Vasular Dementia?   Neurology consulted - continue asa  Speech eval - Recommends NPO for now  NG tube for medications   Dietary consult - started on tube feeding ()  Aspiration Precaution  Little progress, discussed poor prognosis with wife 2017 - no real I mprovement though 2017      Paraoxysmal Afib  Now in NSR  New onset ()  Received one dose dig  On AC Cardiology Consulted  Echo showing EF of 65%     Unintentional Weight loss  CT chest showing possible lung nodule/mass - consulted pulmonary  Lung biopsy - Ct guided on 12/18, however Evaluated by radiology and doubt this is a true lung tumor and recommends follow up ct in 6 months  Pulmonary following and defer investigations  For PET CT , ordered 12/26/2017      Failure to thrive and dysphagia with Severe protein calorie nutrition  NG for feeding and medication admistration   Palliative consulted to discuss with family regarding goals of care if family wishes to proceed with peg tube - for now the wife would like to hold off for now  Family wants to give a chance for parkinson medications to work before deciding on peg     UTI  On levaquin since 12/121  Ucx  Pseudomonas and E coli  Change antibiotics to Aztreonam.  WBC 9.8  But both pseudomonas and e. Coli sense to Levaquin, back on Levaquin to complete a 2 week course of abx. , case discussed with Pharmacy.          Sacral ulcer - unstageable on admission  Sacral, Pressure Injury Unstageable Present on Admission:  7.5 x 5.2 x 0.4 cm 100% soft eschar with 10% pink noted around perimeter.  Periwound hyperpigmented and non blanching; malodor noted; tender to touch; small serous exudate. Wound care consulted  Surgery consulted. Sacral ulcer debridement was done 59/54.     Metabolic encephalopathy from Hypernatremia on admission   Although hypernatremia resolved,mentation not much improved likely due to multiple medical conditions such as advanced Parkinson's disease,dementia,infection,brain infarct. MRI brain showing old pontine infarct  Continue to monitor mentation,doubt will much improve continues, , 12/29/2017 have taken antipsychotic off MAR 2n2 concerns of confusion, EPSE  - no improvement over past 5 days.           Hypernatremia  Resolved  Could be related to reset osmostat in mineralocorticoid use. poor po intake  Hold Florinef  Dextrose IVF  Appreciate renal input  Recent Labs      12/29/17   0555   NA  137          Code status: Full  DVT prophylaxis: SCD     Care Plan discussed with: Patient/Family  Disposition: TBD      12/16 - d/w patients wife and updated on plan, She is concerned about his Parkinson medication, I have called pharmacy and verified its not here. RN will call ED to see if the medication is there, she also reports that he is not on sinemet anymore as he had intolerance to it.   12/17 - updated wife at bedside on plan of care  12/18 - NG placed by nursing however placement was not confirmed, removed  12/19 - Ng placed by IR - Feeding orders given   12/20-  Went into afib new onset. Rate controlled. palliative discussed with patients wife and she wants to leave as full code and all measures done. 12/21 - NSR on monitor. Plan of care discussed with aptients family and they would want full code. 12/22 - Wife in the room with sister and daughter. Plan of care discussed. 12/26  - wife and daughters in room discussed course,   Made family aware of planned PetCT.           Hospital Problems  Date Reviewed: 12/15/2017          Codes Class Noted POA    Metabolic encephalopathy ZJY-81-BE: G93.41  ICD-9-CM: 348.31  12/21/2017 Unknown        * (Principal)Weakness ICD-10-CM: R53.1  ICD-9-CM: 780.79  12/15/2017 Yes        Parkinson disease ([facility] [address] (Chronic) ICD-10-CM: G20  ICD-9-CM: 332.0  11/7/2016 Yes                Review of Systems:   Review of systems not obtained due to patient factors. Vital Signs:    Last 24hrs VS reviewed since prior progress note.  Most recent are:  Visit Vitals    BP 98/42 (BP 1 Location: Left arm, BP Patient Position: At rest)    Pulse 65    Temp 97.2 °F (36.2 °C)    Resp 18    Ht 6' 1\" (1.854 m)    Wt 57.9 kg (127 lb 9.6 oz)    SpO2 96%    BMI 16.83 kg/m2         Intake/Output Summary (Last 24 hours) at 12/29/17 1723  Last data filed at 12/29/17 1712   Gross per 24 hour   Intake               60 ml   Output 500 ml   Net             -440 ml        Physical Examination:             Constitutional:  No acute distress,     ENT:  Oral mucous dry - mouth breaths.;  oropharynx w. o mass lesions Neck supple,    Resp:  CTA bilaterally. No wheezing/rhonchi/rales. No accessory muscle use   CV:  Regular rhythm, normal rate, no murmurs, gallops, rubs    GI:  Soft, non distended, non tender. normoactive bowel sounds, no hepatosplenomegaly     Musculoskeletal:  No edema, warm,generalized muscle loss. Neurologic:  non responsive to moving ext on encouragement, non verlba . Psych:  non responsive, non verbal  no agitated not apparently anxious. Data Review:    Review and/or order of clinical lab test      Labs:     Recent Labs      12/29/17   0555   WBC  10.5   HGB  9.6*   HCT  29.3*   PLT  555*     Recent Labs      12/29/17   0555  12/28/17   0340  12/27/17   0353   NA  137   --    --    K  4.7   --    --    CL  105   --    --    CO2  23   --    --    BUN  12   --    --    CREA  0.67*   --    --    GLU  98   --    --    CA  8.2*   --    --    PHOS  2.7  2.5*  2.4*     No results for input(s): SGOT, GPT, ALT, AP, TBIL, TBILI, TP, ALB, GLOB, GGT, AML, LPSE in the last 72 hours. No lab exists for component: AMYP, HLPSE  No results for input(s): INR, PTP, APTT in the last 72 hours. No lab exists for component: INREXT, INREXT   No results for input(s): FE, TIBC, PSAT, FERR in the last 72 hours. No results found for: FOL, RBCF   No results for input(s): PH, PCO2, PO2 in the last 72 hours. No results for input(s): CPK, CKNDX, TROIQ in the last 72 hours.     No lab exists for component: CPKMB  Lab Results   Component Value Date/Time    Cholesterol, total 106 12/16/2017 05:15 AM    HDL Cholesterol 26 12/16/2017 05:15 AM    LDL, calculated 58.4 12/16/2017 05:15 AM    Triglyceride 108 12/16/2017 05:15 AM    CHOL/HDL Ratio 4.1 12/16/2017 05:15 AM     Lab Results   Component Value Date/Time    Glucose (POC) 107 12/20/2017 11:58 AM    Glucose (POC) 91 11/27/2009 12:20 PM     Lab Results   Component Value Date/Time    Color YELLOW/STRAW 12/21/2017 12:00 AM    Appearance CLOUDY 12/21/2017 12:00 AM    Specific gravity 1.009 12/21/2017 12:00 AM    pH (UA) 6.0 12/21/2017 12:00 AM    Protein NEGATIVE  12/21/2017 12:00 AM    Glucose NEGATIVE  12/21/2017 12:00 AM    Ketone NEGATIVE  12/21/2017 12:00 AM    Bilirubin NEGATIVE  12/21/2017 12:00 AM    Urobilinogen 1.0 12/21/2017 12:00 AM    Nitrites NEGATIVE  12/21/2017 12:00 AM    Leukocyte Esterase MODERATE 12/21/2017 12:00 AM    Epithelial cells FEW 12/21/2017 12:00 AM    Bacteria 4+ 12/21/2017 12:00 AM    WBC 10-20 12/21/2017 12:00 AM    RBC 10-20 12/21/2017 12:00 AM         Medications Reviewed:     Current Facility-Administered Medications   Medication Dose Route Frequency    collagenase (SANTYL) 250 unit/gram ointment   Topical DAILY    glycopyrrolate (ROBINUL) injection 0.2 mg  0.2 mg IntraVENous Q4H PRN    levoFLOXacin (LEVAQUIN) 750 mg in D5W IVPB  750 mg IntraVENous Q24H    balsam peru-castor oil (VENELEX)  mg/gram ointment   Topical BID    sodium hypochlorite (QUARTER STRENGTH DAKIN'S) 0.125% irrigation (bottle)   Topical TID    carbidopa-levodopa (SINEMET)  mg per tablet 1.5 Tab  1.5 Tab Oral QID    pantoprazole (PROTONIX) 2 mg/mL oral suspension 20 mg  20 mg Oral ACB    lidocaine (XYLOCAINE) 2 % jelly   Mucous Membrane PRN    thiamine (B-1) tablet 100 mg  100 mg Per NG tube DAILY    morphine injection 2 mg  2 mg IntraVENous Q4H PRN    aspirin chewable tablet 81 mg  81 mg Oral QHS    polyethylene glycol (MIRALAX) packet 17 g  17 g Oral QHS    latanoprost (XALATAN) 0.005 % ophthalmic solution 1 Drop  1 Drop Both Eyes QHS    sodium chloride (NS) flush 5-10 mL  5-10 mL IntraVENous Q8H    sodium chloride (NS) flush 5-10 mL  5-10 mL IntraVENous PRN    acetaminophen (TYLENOL) tablet 650 mg  650 mg Oral Q4H PRN    Or    acetaminophen (TYLENOL) solution 650 mg  650 mg Per NG tube Q4H PRN    Or    acetaminophen (TYLENOL) suppository 650 mg  650 mg Rectal Q4H PRN    rivastigmine (EXELON) 9.5 mg/24 hr patch 1 Patch  1 Patch TransDERmal DAILY     ______________________________________________________________________  EXPECTED LENGTH OF STAY: 6d 7h  ACTUAL LENGTH OF STAY:          14                 Paola Dunn MD

## 2017-12-29 NOTE — PROGRESS NOTES
Reviewed chart and discussed case with nsg. Patient with no change in function compared to earlier this week. Remains inappropriate for consideration of any PO at this time and note patient has been consistently unable to participate with PT as well with no improvement in mental status or command following. Will follow up x1 mid-week next week and if he remains unable to tolerate secretions or PO trials at that time, will sign off. Recommend continued discussions regarding goals of care. Thanks. Brenda Puente M.CD.  CCC-SLP

## 2017-12-29 NOTE — PROGRESS NOTES
Problem: Self Care Deficits Care Plan (Adult)  Goal: *Acute Goals and Plan of Care (Insert Text)  Occupational Therapy Goals:  Goals reviewed and downgraded to reflect current status: 12/26/17  1. Patient will perform 1 simple grooming task with overall Max A x1 within 7 days. 2.  Patient will participate in anterior upper body bathing with overall Max A x1 within 7 days. 3.  Patient will perform rolling in bed with Max A x2 in preparation for toileting and hygiene within 7 days. 4.  Patient will complete toilet transfer with overall Max A x2 within 7 days. Initiated 12/18/2017  1. Patient will perform simple grooming tasks with overall Mod A x1 within 7 days. 2.  Patient will participate in anterior upper body bathing with overall Mod A x1 within 7 days. 3.  Patient will perform rolling in bed with Mod A x2 in preparation for toileting and hygiene within 7 days. 4.  Patient will complete toilet transfer with overall Mod A x2 within 7 days. Occupational Therapy TREATMENT  Patient: Jannet Pascual (89 y.o. male)  Date: 12/29/2017  Diagnosis: Weakness  Weakness  SACRAL ABSCESS Weakness  Procedure(s) (LRB):  DEBRIDEMENT OF SACRAL ULCER  REQ 1000 (N/A) 7 Days Post-Op  Precautions:    Chart, occupational therapy assessment, plan of care, and goals were reviewed. ASSESSMENT:  Based on the objective data below, the patient is received in bed in a fetal position with daughter and grandson present and eyes closed. Patient participated in gently PROM to all extremities to decrease soft tissue shortening and contractures. Patient with limited PROM, but improved from initial presentation. Instructed daughter and grandson in same PROM for bilateral UEs in shoulder, elbow, wrist and finger flexion/extension within the parameters of mobility without stretch or pressure. Grandson demonstrates understanding. Instructed in performing for 2 sets of 10 reps a day or 3 to 4 sets of 5 reps a day. Patient tolerated well. No command following at this time. Verbalized \"no\" when asked if he was in pain at end of session. If no further progress, will discharge from OT at next session. Progression toward goals:  []          Improving appropriately and progressing toward goals  []          Improving slowly and progressing toward goals  [x]          Not making progress toward goals and plan of care will be monitored     PLAN:  Patient continues to benefit from skilled intervention to address the above impairments. Continue treatment per established plan of care. Discharge Recommendations:  [facility] versus [facility]  Further Equipment Recommendations for Discharge:  none     SUBJECTIVE:   Patient stated No. when asked if he is in pain    OBJECTIVE DATA SUMMARY:   Cognitive/Behavioral Status:  Neurologic State: Lethargic  Orientation Level: Other (Comment) (verbalizes a few words intermittently to questions)  Cognition: Decreased attention/concentration;Decreased command following;Memory loss  Perception: Cues to attend to left side of body;Cues to attend to right side of body;Cues to attend right visual field;Cues to attend left visual field  Perseveration: No perseveration noted  Safety/Judgement: Decreased awareness of environment;Decreased insight into deficits  Functional Mobility and Transfers for ADLs:   ADL Intervention:            Upper Body Bathing  Bathing Assistance: Total assistance(dependent) (inferred from mobility)    Lower Body Bathing  Bathing Assistance:  Total assistance(dependent) (inferred from mobiltiy)          Cognitive Retraining  Safety/Judgement: Decreased awareness of environment;Decreased insight into deficits    Therapeutic Exercises: Bilateral UEs    EXERCISE   Sets   Reps   Active Active Assist   Passive   Comments   Shoulder Flexion 1 10 []               []               [x]                  Shoulder Abduction   []               []               []                  Shoulder external rotation 1 5 []               []               [x]                  Elbow flexion/extension   []               []               []                  Wrist flexion/extension 1 5 []               []               [x]                  Finger flexion/extension 1 3 []               []               [x]                  Bilateral Hip and knee flexion/extension for repositioning for 1 set of 10 reps         Activity Tolerance:    Fair  Please refer to the flowsheet for vital signs taken during this treatment.   After treatment:   []  Patient left in no apparent distress sitting up in chair  [x]  Patient left in no apparent distress in bed  [x]  Call bell left within reach  [x]  Nursing notified  [x]  Caregiver present(daugher and grandson)  []  Bed alarm activated    COMMUNICATION/COLLABORATION:   The patients plan of care was discussed with: Registered Nurse    ALIE Gonsales  Time Calculation: 16 mins

## 2017-12-29 NOTE — PROGRESS NOTES
NG tube at 39 (previously at 72); readvanced and order placed for KUB to confirm placement. Updated family on resuming con't. Feeds as soon as placement is verified. 1646:  Per Dr. Whitney chan to resume tube feeds per KUB results.

## 2017-12-29 NOTE — PROGRESS NOTES
Patient seen for therapy today. He was agreeable to work with therapy but once therapist started LE ROM he began moaning in pain requesting for therapist to stop. Patient has not been progressing with therapy. Will complete therapy orders for now. Nursing on board to continue with ROM as patient tolerates and educate patients wife regarding ROM. Nursing to continue with bed in chair position as patient tolerates.  Edgardo Gallo, PT

## 2017-12-30 ENCOUNTER — APPOINTMENT (OUTPATIENT)
Dept: GENERAL RADIOLOGY | Age: 77
DRG: 981 | End: 2017-12-30
Attending: INTERNAL MEDICINE
Payer: MEDICARE

## 2017-12-30 ENCOUNTER — APPOINTMENT (OUTPATIENT)
Dept: GENERAL RADIOLOGY | Age: 77
DRG: 981 | End: 2017-12-30
Attending: HOSPITALIST
Payer: MEDICARE

## 2017-12-30 LAB — PHOSPHATE SERPL-MCNC: 3.1 MG/DL (ref 2.6–4.7)

## 2017-12-30 PROCEDURE — 74000 XR ABD (KUB): CPT

## 2017-12-30 PROCEDURE — 84100 ASSAY OF PHOSPHORUS: CPT | Performed by: SURGERY

## 2017-12-30 PROCEDURE — 74011250637 HC RX REV CODE- 250/637: Performed by: HOSPITALIST

## 2017-12-30 PROCEDURE — 74011250636 HC RX REV CODE- 250/636: Performed by: INTERNAL MEDICINE

## 2017-12-30 PROCEDURE — 65270000032 HC RM SEMIPRIVATE

## 2017-12-30 PROCEDURE — 36415 COLL VENOUS BLD VENIPUNCTURE: CPT | Performed by: SURGERY

## 2017-12-30 PROCEDURE — 74011250637 HC RX REV CODE- 250/637: Performed by: PSYCHIATRY & NEUROLOGY

## 2017-12-30 PROCEDURE — 74011250637 HC RX REV CODE- 250/637: Performed by: FAMILY MEDICINE

## 2017-12-30 PROCEDURE — 74011000250 HC RX REV CODE- 250: Performed by: SURGERY

## 2017-12-30 RX ORDER — SODIUM CHLORIDE 9 MG/ML
75 INJECTION, SOLUTION INTRAVENOUS CONTINUOUS
Status: DISCONTINUED | OUTPATIENT
Start: 2017-12-30 | End: 2018-01-04

## 2017-12-30 RX ADMIN — CARBIDOPA AND LEVODOPA 1.5 TABLET: 25; 100 TABLET ORAL at 10:38

## 2017-12-30 RX ADMIN — Medication 10 ML: at 13:45

## 2017-12-30 RX ADMIN — ASPIRIN 81 MG 81 MG: 81 TABLET ORAL at 23:28

## 2017-12-30 RX ADMIN — SODIUM CHLORIDE 75 ML/HR: 900 INJECTION, SOLUTION INTRAVENOUS at 22:23

## 2017-12-30 RX ADMIN — SODIUM CHLORIDE 75 ML/HR: 900 INJECTION, SOLUTION INTRAVENOUS at 15:45

## 2017-12-30 RX ADMIN — CARBIDOPA AND LEVODOPA 1.5 TABLET: 25; 100 TABLET ORAL at 00:08

## 2017-12-30 RX ADMIN — Medication 10 ML: at 00:09

## 2017-12-30 RX ADMIN — CASTOR OIL AND BALSAM, PERU: 788; 87 OINTMENT TOPICAL at 10:39

## 2017-12-30 RX ADMIN — LATANOPROST 1 DROP: 50 SOLUTION OPHTHALMIC at 23:31

## 2017-12-30 RX ADMIN — LATANOPROST 1 DROP: 50 SOLUTION OPHTHALMIC at 00:10

## 2017-12-30 RX ADMIN — Medication 10 ML: at 23:30

## 2017-12-30 RX ADMIN — PANTOPRAZOLE SODIUM 20 MG: 40 TABLET, DELAYED RELEASE ORAL at 07:30

## 2017-12-30 RX ADMIN — COLLAGENASE SANTYL: 250 OINTMENT TOPICAL at 10:39

## 2017-12-30 RX ADMIN — LEVOFLOXACIN 750 MG: 5 INJECTION, SOLUTION INTRAVENOUS at 13:45

## 2017-12-30 RX ADMIN — CARBIDOPA AND LEVODOPA 1.5 TABLET: 25; 100 TABLET ORAL at 23:28

## 2017-12-30 RX ADMIN — CARBIDOPA AND LEVODOPA 1.5 TABLET: 25; 100 TABLET ORAL at 13:45

## 2017-12-30 RX ADMIN — ASPIRIN 81 MG 81 MG: 81 TABLET ORAL at 00:08

## 2017-12-30 RX ADMIN — CASTOR OIL AND BALSAM, PERU: 788; 87 OINTMENT TOPICAL at 17:41

## 2017-12-30 RX ADMIN — CARBIDOPA AND LEVODOPA 1.5 TABLET: 25; 100 TABLET ORAL at 17:41

## 2017-12-30 RX ADMIN — SODIUM CHLORIDE 500 ML: 900 INJECTION, SOLUTION INTRAVENOUS at 15:06

## 2017-12-30 RX ADMIN — DAKIN'S SOLUTION 0.125% (QUARTER STRENGTH): 0.12 SOLUTION at 22:25

## 2017-12-30 NOTE — PROGRESS NOTES
Bedside shift change report given to Pastor Crowley RN (oncoming nurse) by Juanita Nava RN (offgoing nurse). Report included the following information SBAR and Kardex.

## 2017-12-30 NOTE — PROGRESS NOTES
Hospitalist Progress Note  [place] MD Diana  Answering service: [phone] from in house phone         Date of Service:  2017  NAME:  April Kuo  :  1940  MRN:  [id number]    Admission Summary:      Patient admitted for weight loss and failure to thrive     Interval history / Subjective:           Pt seen at bed side with tech and with wife and children  Pt seems to be able to marifer Sinemetbut but functionally pt is with advanced wt loss, low muscle mass and stiff,    Last CXR of record reviewed by me and agree that no mass noted, but CT is definitive     :  Pt somnolent post wound check and morphine  Wife in room, discussed case  Advise that I have removed the antipsychotic as it may be making things worst for pt in setting of encephalopathy. :   A bit more alert but not clinically significant, wife elects PEG tube,  Has discussed Hospice with Palliative but not quiet ready to pull that trigger at this time this afternoon. GI consulted for peg tube placement. :   No real significant improvement in mental/motor functions. Pt prostrae in bed, ng tube in place, and position documented, on tube feedings,  Seen by GI and will possibly be set up for Peg early next week,   Wife, daughter/friend in room as well as RN on rounds, case discussed with CM      :   Pt pulled NGTube out, replaced, pt placed in non violent Mittts to protect medical  /pt. Discussed with family in room. Pt more alert and spoke some today but  Mostly meaningless verbage. 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   Neurology consulted - continue asa  Speech eval - Recommends NPO for now  NG tube for medications   Dietary consult - started on tube feeding ()  Aspiration Precaution  Little progress, discussed poor prognosis with wife 12/26/2017 - no real I mprovement though 12/30/2017      Paraoxysmal Afib  Now in NSR  New onset (12/20)  Received one dose dig  On [facility]   Cardiology Consulted  Echo showing EF of 65%     Unintentional Weight loss  CT chest showing possible lung nodule/mass - consulted pulmonary  Lung biopsy - Ct guided on 12/18, however Evaluated by radiology and doubt this is a true lung tumor and recommends follow up ct in 6 months  Pulmonary following and defer investigations  For PET CT , ordered 12/26/2017      Failure to thrive and dysphagia with Severe protein calorie nutrition  NG for feeding and medication admistration   Palliative consulted to discuss with family regarding goals of care if family wishes to proceed with peg tube - for now the wife would like to hold off for now  Family wants to give a chance for parkinson medications to work before deciding on peg-      For re-consideration early next week.      UTI  On levaquin since 12/121  Ucx  Pseudomonas and E coli  Change antibiotics to Aztreonam.  WBC 9.8  But both pseudomonas and e. Coli sense to Levaquin, back on Levaquin to complete a 2 week course of abx. , case discussed with Pharmacy.          Sacral ulcer - unstageable on admission  Sacral, Pressure Injury Unstageable Present on Admission:  7.5 x 5.2 x 0.4 cm 100% soft eschar with 10% pink noted around perimeter.  Periwound hyperpigmented and non blanching; malodor noted; tender to touch; small serous exudate. Wound care consulted  Surgery consulted. Sacral ulcer debridement was done 94/30.     Metabolic encephalopathy from Hypernatremia on admission   Although hypernatremia resolved,mentation not much improved likely due to multiple medical conditions such as advanced Parkinson's disease,dementia,infection,brain infarct.   MRI brain showing old pontine infarct  Continue to monitor mentation,doubt will much improve continues, , 12/30/2017 have taken antipsychotic off MAR 2n2 concerns of confusion, EPSE  - no improvement over past 5 days.           Hypernatremia  Resolved  Could be related to reset osmostat in mineralocorticoid use. poor po intake  Hold Florinef  Dextrose IVF  Appreciate renal input  Recent Labs      12/29/17   0555   NA  137          Code status: Full  DVT prophylaxis: SCD     Care Plan discussed with: Patient/Family  Disposition: TBD      12/16 - d/w patients wife and updated on plan, She is concerned about his Parkinson medication, I have called pharmacy and verified its not here. RN will call ED to see if the medication is there, she also reports that he is not on sinemet anymore as he had intolerance to it.   12/17 - updated wife at bedside on plan of care  12/18 - NG placed by nursing however placement was not confirmed, removed  12/19 - Ng placed by IR - Feeding orders given   12/20-  Went into afib new onset. Rate controlled. palliative discussed with patients wife and she wants to leave as full code and all measures done. 12/21 - NSR on monitor. Plan of care discussed with aptients family and they would want full code. 12/22 - Wife in the room with sister and daughter. Plan of care discussed. 12/26  - wife and daughters in room discussed course,   Made family aware of planned PetCT.           Hospital Problems  Date Reviewed: 12/15/2017          Codes Class Noted POA    Metabolic encephalopathy BXI-97-BJ: G93.41  ICD-9-CM: 348.31  12/21/2017 Unknown        * (Principal)Weakness ICD-10-CM: R53.1  ICD-9-CM: 780.79  12/15/2017 Yes        Parkinson disease ([facility] [address] (Chronic) ICD-10-CM: G20  ICD-9-CM: 332.0  11/7/2016 Yes                Review of Systems:   Review of systems not obtained due to patient factors. Vital Signs:    Last 24hrs VS reviewed since prior progress note.  Most recent are:  Visit Vitals    /41 (BP 1 Location: Left arm, BP Patient Position: At rest)    Pulse 70    Temp 100.4 °F (38 °C)    Resp 17    Ht 6' 1\" (1.854 m)    Wt 57.9 kg (127 lb 9.6 oz)    SpO2 96%    BMI 16.83 kg/m2         Intake/Output Summary (Last 24 hours) at 12/30/17 1344  Last data filed at 12/30/17 1214   Gross per 24 hour   Intake              560 ml   Output             2200 ml   Net            -1640 ml        Physical Examination:             Constitutional:  No acute distress,     ENT:  Oral mucous dry - mouth breaths.;  oropharynx w. o mass lesions Neck supple,    Resp:  CTA bilaterally. No wheezing/rhonchi/rales. No accessory muscle use   CV:  Regular rhythm, normal rate, no murmurs, gallops, rubs    GI:  Soft, non distended, non tender. normoactive bowel sounds, no hepatosplenomegaly     Musculoskeletal:  No edema, warm,generalized muscle loss. Neurologic:  non responsive to moving ext on encouragement, non verlba . Psych:  non responsive, non verbal  no agitated not apparently anxious. Data Review:    Review and/or order of clinical lab test      Labs:     Recent Labs      12/29/17   0555   WBC  10.5   HGB  9.6*   HCT  29.3*   PLT  555*     Recent Labs      12/30/17   0624  12/29/17   0555  12/28/17   0340   NA   --   137   --    K   --   4.7   --    CL   --   105   --    CO2   --   23   --    BUN   --   12   --    CREA   --   0.67*   --    GLU   --   98   --    CA   --   8.2*   --    PHOS  3.1  2.7  2.5*     No results for input(s): SGOT, GPT, ALT, AP, TBIL, TBILI, TP, ALB, GLOB, GGT, AML, LPSE in the last 72 hours. No lab exists for component: AMYP, HLPSE  No results for input(s): INR, PTP, APTT in the last 72 hours. No lab exists for component: INREXT, INREXT   No results for input(s): FE, TIBC, PSAT, FERR in the last 72 hours. No results found for: FOL, RBCF   No results for input(s): PH, PCO2, PO2 in the last 72 hours. No results for input(s): CPK, CKNDX, TROIQ in the last 72 hours.     No lab exists for component: CPKMB  Lab Results   Component Value Date/Time    Cholesterol, total 106 12/16/2017 05:15 AM    HDL Cholesterol 26 12/16/2017 05:15 AM    LDL, calculated 58.4 12/16/2017 05:15 AM    Triglyceride 108 12/16/2017 05:15 AM    CHOL/HDL Ratio 4.1 12/16/2017 05:15 AM     Lab Results   Component Value Date/Time    Glucose (POC) 107 12/20/2017 11:58 AM    Glucose (POC) 91 11/27/2009 12:20 PM     Lab Results   Component Value Date/Time    Color YELLOW/STRAW 12/21/2017 12:00 AM    Appearance CLOUDY 12/21/2017 12:00 AM    Specific gravity 1.009 12/21/2017 12:00 AM    pH (UA) 6.0 12/21/2017 12:00 AM    Protein NEGATIVE  12/21/2017 12:00 AM    Glucose NEGATIVE  12/21/2017 12:00 AM    Ketone NEGATIVE  12/21/2017 12:00 AM    Bilirubin NEGATIVE  12/21/2017 12:00 AM    Urobilinogen 1.0 12/21/2017 12:00 AM    Nitrites NEGATIVE  12/21/2017 12:00 AM    Leukocyte Esterase MODERATE 12/21/2017 12:00 AM    Epithelial cells FEW 12/21/2017 12:00 AM    Bacteria 4+ 12/21/2017 12:00 AM    WBC 10-20 12/21/2017 12:00 AM    RBC 10-20 12/21/2017 12:00 AM         Medications Reviewed:     Current Facility-Administered Medications   Medication Dose Route Frequency    collagenase (SANTYL) 250 unit/gram ointment   Topical DAILY    glycopyrrolate (ROBINUL) injection 0.2 mg  0.2 mg IntraVENous Q4H PRN    levoFLOXacin (LEVAQUIN) 750 mg in D5W IVPB  750 mg IntraVENous Q24H    balsam peru-castor oil (VENELEX)  mg/gram ointment   Topical BID    sodium hypochlorite (QUARTER STRENGTH DAKIN'S) 0.125% irrigation (bottle)   Topical TID    carbidopa-levodopa (SINEMET)  mg per tablet 1.5 Tab  1.5 Tab Oral QID    pantoprazole (PROTONIX) 2 mg/mL oral suspension 20 mg  20 mg Oral ACB    lidocaine (XYLOCAINE) 2 % jelly   Mucous Membrane PRN    morphine injection 2 mg  2 mg IntraVENous Q4H PRN    aspirin chewable tablet 81 mg  81 mg Oral QHS    polyethylene glycol (MIRALAX) packet 17 g  17 g Oral QHS    latanoprost (XALATAN) 0.005 % ophthalmic solution 1 Drop  1 Drop Both Eyes QHS    sodium chloride (NS) flush 5-10 mL  5-10 mL IntraVENous Q8H    sodium chloride (NS) flush 5-10 mL  5-10 mL IntraVENous PRN    acetaminophen (TYLENOL) tablet 650 mg  650 mg Oral Q4H PRN    Or    acetaminophen (TYLENOL) solution 650 mg  650 mg Per NG tube Q4H PRN    Or    acetaminophen (TYLENOL) suppository 650 mg  650 mg Rectal Q4H PRN    rivastigmine (EXELON) 9.5 mg/24 hr patch 1 Patch  1 Patch TransDERmal DAILY     ______________________________________________________________________  EXPECTED LENGTH OF STAY: 6d 7h  ACTUAL LENGTH OF STAY:          15                 Excell Paget, MD

## 2017-12-30 NOTE — PROGRESS NOTES
12/30/17 1454   Vitals   Temp 99 °F (37.2 °C)   Temp Source Axillary   Pulse (Heart Rate) 68   Heart Rate Source Monitor   Resp Rate 18   O2 Sat (%) 100 %   Level of Consciousness Alert   BP (!) 78/30   MAP (Calculated) (!) 46   BP 1 Location Left arm   BP 1 Method Automatic   BP Patient Position At rest   MEWS Score 3     Paged Dr Ruel Griggs about patients bp being 78/30. He told me to take a manual reading and page him back. 1505: Paged Dr. Ruel Griggs again and he told me to give 500 bolus of NS then start fluids at 75 ml/hr. Will continue to follow. 1545: Post bolus patients bp was 124/58. Turned him on his left side and cleaned him up from a bm and his condom cath coming off. Patient is now on NS at 75ml/hr. Scott Epley from CCU also came to assess the patient and stated that he sounded fine and vitals were stable post bolus. Will continue to follow.

## 2017-12-30 NOTE — PROGRESS NOTES
Dr Olga Hook notified of pts BP and other VS ordered bolus of 500cc NS - [facility] Supervisor notified she will send nurse from rapid team to assess

## 2017-12-30 NOTE — PROGRESS NOTES
Bedside shift change report given to Arie García RN (oncoming nurse) by Jw Cox (offgoing nurse). Report included the following information SBAR and MAR.     0800 am:  Walked into patient's room with the day shift nurse and NG tube was found on the floor. Patient was alert and disoriented x3. Patient advised that the NG would have to be replaced and patient said \"not now\". New size 15 NG tube placed in right nares. Order for Stat KUB placed to check placement. Patient is coughing s/p NG placement. Attempted to suction patient with [place] [facility]. Small amount of clear sputum noted. Hospitalist Team 8 paged to notify of the situation. Patient will need an order for mitts if the physician would like to continue with the NG tube.

## 2017-12-31 LAB
ALBUMIN SERPL-MCNC: 1.4 G/DL (ref 3.5–5)
ALBUMIN/GLOB SERPL: 0.3 {RATIO} (ref 1.1–2.2)
ALP SERPL-CCNC: 72 U/L (ref 45–117)
ALT SERPL-CCNC: 34 U/L (ref 12–78)
ANION GAP SERPL CALC-SCNC: 8 MMOL/L (ref 5–15)
AST SERPL-CCNC: 67 U/L (ref 15–37)
BASOPHILS # BLD: 0 K/UL (ref 0–0.1)
BASOPHILS NFR BLD: 0 % (ref 0–1)
BILIRUB SERPL-MCNC: 0.4 MG/DL (ref 0.2–1)
BUN SERPL-MCNC: 11 MG/DL (ref 6–20)
BUN/CREAT SERPL: 17 (ref 12–20)
CALCIUM SERPL-MCNC: 8.1 MG/DL (ref 8.5–10.1)
CHLORIDE SERPL-SCNC: 105 MMOL/L (ref 97–108)
CO2 SERPL-SCNC: 28 MMOL/L (ref 21–32)
CREAT SERPL-MCNC: 0.65 MG/DL (ref 0.7–1.3)
EOSINOPHIL # BLD: 0.2 K/UL (ref 0–0.4)
EOSINOPHIL NFR BLD: 2 % (ref 0–7)
ERYTHROCYTE [DISTWIDTH] IN BLOOD BY AUTOMATED COUNT: 15.5 % (ref 11.5–14.5)
GLOBULIN SER CALC-MCNC: 4.6 G/DL (ref 2–4)
GLUCOSE SERPL-MCNC: 102 MG/DL (ref 65–100)
HCT VFR BLD AUTO: 27.3 % (ref 36.6–50.3)
HGB BLD-MCNC: 9 G/DL (ref 12.1–17)
LYMPHOCYTES # BLD: 0.5 K/UL (ref 0.8–3.5)
LYMPHOCYTES NFR BLD: 5 % (ref 12–49)
MCH RBC QN AUTO: 30.7 PG (ref 26–34)
MCHC RBC AUTO-ENTMCNC: 33 G/DL (ref 30–36.5)
MCV RBC AUTO: 93.2 FL (ref 80–99)
MONOCYTES # BLD: 0.5 K/UL (ref 0–1)
MONOCYTES NFR BLD: 5 % (ref 5–13)
NEUTS SEG # BLD: 8.4 K/UL (ref 1.8–8)
NEUTS SEG NFR BLD: 88 % (ref 32–75)
PLATELET # BLD AUTO: 583 K/UL (ref 150–400)
POTASSIUM SERPL-SCNC: 4.1 MMOL/L (ref 3.5–5.1)
PROT SERPL-MCNC: 6 G/DL (ref 6.4–8.2)
RBC # BLD AUTO: 2.93 M/UL (ref 4.1–5.7)
SODIUM SERPL-SCNC: 141 MMOL/L (ref 136–145)
WBC # BLD AUTO: 9.5 K/UL (ref 4.1–11.1)

## 2017-12-31 PROCEDURE — 74011250637 HC RX REV CODE- 250/637: Performed by: HOSPITALIST

## 2017-12-31 PROCEDURE — 74011250636 HC RX REV CODE- 250/636: Performed by: INTERNAL MEDICINE

## 2017-12-31 PROCEDURE — 85025 COMPLETE CBC W/AUTO DIFF WBC: CPT | Performed by: INTERNAL MEDICINE

## 2017-12-31 PROCEDURE — 65270000032 HC RM SEMIPRIVATE

## 2017-12-31 PROCEDURE — 74011250637 HC RX REV CODE- 250/637: Performed by: PSYCHIATRY & NEUROLOGY

## 2017-12-31 PROCEDURE — 80053 COMPREHEN METABOLIC PANEL: CPT | Performed by: INTERNAL MEDICINE

## 2017-12-31 PROCEDURE — 74011250637 HC RX REV CODE- 250/637: Performed by: FAMILY MEDICINE

## 2017-12-31 PROCEDURE — 36415 COLL VENOUS BLD VENIPUNCTURE: CPT | Performed by: INTERNAL MEDICINE

## 2017-12-31 RX ADMIN — CASTOR OIL AND BALSAM, PERU: 788; 87 OINTMENT TOPICAL at 09:53

## 2017-12-31 RX ADMIN — SODIUM CHLORIDE 75 ML/HR: 900 INJECTION, SOLUTION INTRAVENOUS at 12:15

## 2017-12-31 RX ADMIN — ASPIRIN 81 MG 81 MG: 81 TABLET ORAL at 21:34

## 2017-12-31 RX ADMIN — CARBIDOPA AND LEVODOPA 1.5 TABLET: 25; 100 TABLET ORAL at 17:48

## 2017-12-31 RX ADMIN — PANTOPRAZOLE SODIUM 20 MG: 40 TABLET, DELAYED RELEASE ORAL at 07:12

## 2017-12-31 RX ADMIN — CASTOR OIL AND BALSAM, PERU: 788; 87 OINTMENT TOPICAL at 17:49

## 2017-12-31 RX ADMIN — Medication 10 ML: at 14:47

## 2017-12-31 RX ADMIN — Medication 10 ML: at 21:34

## 2017-12-31 RX ADMIN — CARBIDOPA AND LEVODOPA 1.5 TABLET: 25; 100 TABLET ORAL at 21:34

## 2017-12-31 RX ADMIN — LEVOFLOXACIN 750 MG: 5 INJECTION, SOLUTION INTRAVENOUS at 14:47

## 2017-12-31 RX ADMIN — Medication 10 ML: at 06:00

## 2017-12-31 RX ADMIN — CARBIDOPA AND LEVODOPA 1.5 TABLET: 25; 100 TABLET ORAL at 07:12

## 2017-12-31 RX ADMIN — LATANOPROST 1 DROP: 50 SOLUTION OPHTHALMIC at 21:36

## 2017-12-31 RX ADMIN — COLLAGENASE SANTYL: 250 OINTMENT TOPICAL at 09:53

## 2017-12-31 RX ADMIN — CARBIDOPA AND LEVODOPA 1.5 TABLET: 25; 100 TABLET ORAL at 12:18

## 2017-12-31 RX ADMIN — DAKIN'S SOLUTION 0.125% (QUARTER STRENGTH): 0.12 SOLUTION at 12:18

## 2017-12-31 NOTE — PROGRESS NOTES
Hospitalist Progress Note  Philippe Capellan MD  Answering service: [phone] from in house phone         Date of Service:  2017  NAME:  Sherri Bloch  :  1940  MRN:  [id number]    Admission Summary:      Patient admitted for weight loss and failure to thrive     Interval history / Subjective:           Pt seen at bed side with tech and with wife and children  Pt seems to be able to marifer Sinemetbut but functionally pt is with advanced wt loss, low muscle mass and stiff,    Last CXR of record reviewed by me and agree that no mass noted, but CT is definitive     :  Pt somnolent post wound check and morphine  Wife in room, discussed case  Advise that I have removed the antipsychotic as it may be making things worst for pt in setting of encephalopathy. :   A bit more alert but not clinically significant, wife elects PEG tube,  Has discussed Hospice with Palliative but not quiet ready to pull that trigger at this time this afternoon. GI consulted for peg tube placement. :   No real significant improvement in mental/motor functions. Pt prostrae in bed, ng tube in place, and position documented, on tube feedings,  Seen by GI and will possibly be set up for Peg early next week,   Wife, daughter/friend in room as well as RN on rounds, case discussed with CM      :   Pt pulled NGTube out, replaced, pt placed in non violent Mittts to protect medical  /pt. Discussed with family in room. Pt more alert and spoke some today but  Mostly meaningless verbage.      :  Pt give one word response to question and else opens mouth partially to command, else no meaningful response , marked muscle waisting generalized, cont on tube feeding, I recommended that family pursue peg tube if they plan to continue to desire aggressive code status as in tube feedings and medicines as indicated other than comfort care,  I feel that hospice or at least only  comfort care is the best route to take for this pt at this time. Lab for am , non violent restraints, , case discussed with RN and family     Assessment & Plan:      Weakness - generalized  Multifactorial - deconditioning,failure to thrive,severe debility  tsh and Cortisol levels wnl  MRI - old pontine infarct,Vasular Dementia? Neurology consulted - continue asa  Speech eval - Recommends NPO for now  NG tube for medications   Dietary consult - started on tube feeding (12/18)  Aspiration Precaution  Little progress, discussed poor prognosis with wife 12/26/2017 - no real I mprovement though 12/31/2017      Paraoxysmal Afib  Now in NSR  New onset (12/20)  Received one dose dig  On [facility]   Cardiology Consulted  Echo showing EF of 65%     Unintentional Weight loss  CT chest showing possible lung nodule/mass - consulted pulmonary  Lung biopsy - Ct guided on 12/18, however Evaluated by radiology and doubt this is a true lung tumor and recommends follow up ct in 6 months  Pulmonary following and defer investigations  For PET CT , ordered 12/26/2017      Failure to thrive and dysphagia with Severe protein calorie nutrition  NG for feeding and medication admistration   Palliative consulted to discuss with family regarding goals of care if family wishes to proceed with peg tube - for now the wife would like to hold off for now  Family wants to give a chance for parkinson medications to work before deciding on peg-      For re-consideration early next week.      UTI  On levaquin since 12/121  Ucx  Pseudomonas and E coli  Change antibiotics to Aztreonam.  WBC 9.8  But both pseudomonas and e. Coli sense to Levaquin, back on Levaquin to complete a 2 week course of abx. , case discussed with Pharmacy.             Sacral ulcer - unstageable on admission  Sacral, Pressure Injury Unstageable Present on Admission:  7.5 x 5.2 x 0.4 cm 100% soft eschar with 10% pink noted around perimeter.  Periwound hyperpigmented and non blanching; malodor noted; tender to touch; small serous exudate. Wound care consulted  Surgery consulted. Sacral ulcer debridement was done 18/31.     Metabolic encephalopathy from Hypernatremia on admission   Although hypernatremia resolved,mentation not much improved likely due to multiple medical conditions such as advanced Parkinson's disease,dementia,infection,brain infarct. MRI brain showing old pontine infarct  Continue to monitor mentation,doubt will much improve continues, , 12/31/2017 have taken antipsychotic off MAR 2n2 concerns of confusion, EPSE  - no improvement over past 5 days.           Hypernatremia  Resolved  Could be related to reset osmostat in mineralocorticoid use. poor po intake  Hold Florinef  Dextrose IVF  Appreciate renal input  Recent Labs      12/31/17   0411   NA  141          Code status: Full  DVT prophylaxis: SCD     Care Plan discussed with: Patient/Family  Disposition: TBD      12/16 - d/w patients wife and updated on plan, She is concerned about his Parkinson medication, I have called pharmacy and verified its not here. RN will call ED to see if the medication is there, she also reports that he is not on sinemet anymore as he had intolerance to it.   12/17 - updated wife at bedside on plan of care  12/18 - NG placed by nursing however placement was not confirmed, removed  12/19 - Ng placed by IR - Feeding orders given   12/20-  Went into afib new onset. Rate controlled. palliative discussed with patients wife and she wants to leave as full code and all measures done. 12/21 - NSR on monitor. Plan of care discussed with aptients family and they would want full code. 12/22 - Wife in the room with sister and daughter. Plan of care discussed.   12/26  - wife and daughters in room discussed course,   Made family aware of planned PetCT.           Hospital Problems  Date Reviewed: 12/15/2017          Codes Class Noted POA    Metabolic encephalopathy WGT-65-SS: G93.41  ICD-9-CM: 348.31  12/21/2017 Unknown        * (Principal)Weakness ICD-10-CM: R53.1  ICD-9-CM: 780.79  12/15/2017 Yes        Parkinson disease ([facility] [address] (Chronic) ICD-10-CM: G20  ICD-9-CM: 332.0  11/7/2016 Yes                Review of Systems:   Review of systems not obtained due to patient factors. Vital Signs:    Last 24hrs VS reviewed since prior progress note. Most recent are:  Visit Vitals    /69 (BP 1 Location: Left arm, BP Patient Position: At rest)    Pulse 64    Temp 98.7 °F (37.1 °C)    Resp 16    Ht 6' 1\" (1.854 m)    Wt 57.9 kg (127 lb 9.6 oz)    SpO2 99%    BMI 16.83 kg/m2         Intake/Output Summary (Last 24 hours) at 12/31/17 1500  Last data filed at 12/31/17 1054   Gross per 24 hour   Intake             3001 ml   Output             1150 ml   Net             1851 ml        Physical Examination:             Constitutional:  No acute distress,     ENT:  Oral mucous dry - mouth breaths.;  oropharynx w. o mass lesions Neck supple,    Resp:  CTA bilaterally. No wheezing/rhonchi/rales. No accessory muscle use   CV:  Regular rhythm, normal rate, no murmurs, gallops, rubs    GI:  Soft, non distended, non tender. normoactive bowel sounds, no hepatosplenomegaly     Musculoskeletal:  No edema, warm,generalized muscle loss. Neurologic:  non responsive to moving ext on encouragement, non verlba . Psych:  non responsive, non verbal  no agitated not apparently anxious.         Data Review:    Review and/or order of clinical lab test      Labs:     Recent Labs      12/31/17 0411 12/29/17   0555   WBC  9.5  10.5   HGB  9.0*  9.6*   HCT  27.3*  29.3*   PLT  583*  555*     Recent Labs      12/31/17 0411 12/30/17   0624  12/29/17   0555   NA  141   --   137   K  4.1   --   4.7   CL  105   --   105   CO2  28   --   23   BUN  11   --   12   CREA  0.65*   --   0.67*   GLU  102*   --   98   CA  8.1*   --   8.2*   PHOS   --   3.1  2.7     Recent Labs      12/31/17   0411   ARVIN  67* ALT  34   AP  72   TBILI  0.4   TP  6.0*   ALB  1.4*   GLOB  4.6*     No results for input(s): INR, PTP, APTT in the last 72 hours. No lab exists for component: INREXT, INREXT   No results for input(s): FE, TIBC, PSAT, FERR in the last 72 hours. No results found for: FOL, RBCF   No results for input(s): PH, PCO2, PO2 in the last 72 hours. No results for input(s): CPK, CKNDX, TROIQ in the last 72 hours.     No lab exists for component: CPKMB  Lab Results   Component Value Date/Time    Cholesterol, total 106 12/16/2017 05:15 AM    HDL Cholesterol 26 12/16/2017 05:15 AM    LDL, calculated 58.4 12/16/2017 05:15 AM    Triglyceride 108 12/16/2017 05:15 AM    CHOL/HDL Ratio 4.1 12/16/2017 05:15 AM     Lab Results   Component Value Date/Time    Glucose (POC) 107 12/20/2017 11:58 AM    Glucose (POC) 91 11/27/2009 12:20 PM     Lab Results   Component Value Date/Time    Color YELLOW/STRAW 12/21/2017 12:00 AM    Appearance CLOUDY 12/21/2017 12:00 AM    Specific gravity 1.009 12/21/2017 12:00 AM    pH (UA) 6.0 12/21/2017 12:00 AM    Protein NEGATIVE  12/21/2017 12:00 AM    Glucose NEGATIVE  12/21/2017 12:00 AM    Ketone NEGATIVE  12/21/2017 12:00 AM    Bilirubin NEGATIVE  12/21/2017 12:00 AM    Urobilinogen 1.0 12/21/2017 12:00 AM    Nitrites NEGATIVE  12/21/2017 12:00 AM    Leukocyte Esterase MODERATE 12/21/2017 12:00 AM    Epithelial cells FEW 12/21/2017 12:00 AM    Bacteria 4+ 12/21/2017 12:00 AM    WBC 10-20 12/21/2017 12:00 AM    RBC 10-20 12/21/2017 12:00 AM         Medications Reviewed:     Current Facility-Administered Medications   Medication Dose Route Frequency    0.9% sodium chloride infusion  75 mL/hr IntraVENous CONTINUOUS    collagenase (SANTYL) 250 unit/gram ointment   Topical DAILY    glycopyrrolate (ROBINUL) injection 0.2 mg  0.2 mg IntraVENous Q4H PRN    levoFLOXacin (LEVAQUIN) 750 mg in D5W IVPB  750 mg IntraVENous Q24H    balsam peru-castor oil (VENELEX)  mg/gram ointment   Topical BID    sodium hypochlorite (QUARTER STRENGTH DAKIN'S) 0.125% irrigation (bottle)   Topical TID    carbidopa-levodopa (SINEMET)  mg per tablet 1.5 Tab  1.5 Tab Oral QID    pantoprazole (PROTONIX) 2 mg/mL oral suspension 20 mg  20 mg Oral ACB    lidocaine (XYLOCAINE) 2 % jelly   Mucous Membrane PRN    morphine injection 2 mg  2 mg IntraVENous Q4H PRN    aspirin chewable tablet 81 mg  81 mg Oral QHS    polyethylene glycol (MIRALAX) packet 17 g  17 g Oral QHS    latanoprost (XALATAN) 0.005 % ophthalmic solution 1 Drop  1 Drop Both Eyes QHS    sodium chloride (NS) flush 5-10 mL  5-10 mL IntraVENous Q8H    sodium chloride (NS) flush 5-10 mL  5-10 mL IntraVENous PRN    acetaminophen (TYLENOL) tablet 650 mg  650 mg Oral Q4H PRN    Or    acetaminophen (TYLENOL) solution 650 mg  650 mg Per NG tube Q4H PRN    Or    acetaminophen (TYLENOL) suppository 650 mg  650 mg Rectal Q4H PRN    rivastigmine (EXELON) 9.5 mg/24 hr patch 1 Patch  1 Patch TransDERmal DAILY     ______________________________________________________________________  EXPECTED LENGTH OF STAY: 6d 7h  ACTUAL LENGTH OF STAY:          16                 Philippe Capellan MD

## 2017-12-31 NOTE — PROGRESS NOTES
Bedside shift change report given to Susana RN (oncoming nurse) by Syeda Mueller RN (offgoing nurse). Report included the following information SBAR, Kardex, ED Summary, [facility] and Recent Results.

## 2018-01-01 PROCEDURE — 74011250637 HC RX REV CODE- 250/637: Performed by: FAMILY MEDICINE

## 2018-01-01 PROCEDURE — 65270000032 HC RM SEMIPRIVATE

## 2018-01-01 PROCEDURE — 74011250637 HC RX REV CODE- 250/637: Performed by: PSYCHIATRY & NEUROLOGY

## 2018-01-01 PROCEDURE — 74011250636 HC RX REV CODE- 250/636: Performed by: HOSPITALIST

## 2018-01-01 PROCEDURE — 74011250637 HC RX REV CODE- 250/637: Performed by: HOSPITALIST

## 2018-01-01 PROCEDURE — 74011250636 HC RX REV CODE- 250/636: Performed by: INTERNAL MEDICINE

## 2018-01-01 RX ADMIN — SODIUM CHLORIDE 75 ML/HR: 900 INJECTION, SOLUTION INTRAVENOUS at 05:35

## 2018-01-01 RX ADMIN — DAKIN'S SOLUTION 0.125% (QUARTER STRENGTH): 0.12 SOLUTION at 13:42

## 2018-01-01 RX ADMIN — ACETAMINOPHEN 650 MG: 650 SOLUTION ORAL at 11:06

## 2018-01-01 RX ADMIN — PANTOPRAZOLE SODIUM 20 MG: 40 TABLET, DELAYED RELEASE ORAL at 08:51

## 2018-01-01 RX ADMIN — CARBIDOPA AND LEVODOPA 1.5 TABLET: 25; 100 TABLET ORAL at 13:42

## 2018-01-01 RX ADMIN — CARBIDOPA AND LEVODOPA 1.5 TABLET: 25; 100 TABLET ORAL at 08:50

## 2018-01-01 RX ADMIN — ASPIRIN 81 MG 81 MG: 81 TABLET ORAL at 22:43

## 2018-01-01 RX ADMIN — CASTOR OIL AND BALSAM, PERU: 788; 87 OINTMENT TOPICAL at 17:58

## 2018-01-01 RX ADMIN — SODIUM CHLORIDE 75 ML/HR: 900 INJECTION, SOLUTION INTRAVENOUS at 21:54

## 2018-01-01 RX ADMIN — LATANOPROST 1 DROP: 50 SOLUTION OPHTHALMIC at 22:44

## 2018-01-01 RX ADMIN — Medication 10 ML: at 13:32

## 2018-01-01 RX ADMIN — LEVOFLOXACIN 750 MG: 5 INJECTION, SOLUTION INTRAVENOUS at 13:32

## 2018-01-01 RX ADMIN — CASTOR OIL AND BALSAM, PERU: 788; 87 OINTMENT TOPICAL at 10:22

## 2018-01-01 RX ADMIN — COLLAGENASE SANTYL: 250 OINTMENT TOPICAL at 10:23

## 2018-01-01 RX ADMIN — Medication 10 ML: at 22:45

## 2018-01-01 RX ADMIN — MORPHINE SULFATE 2 MG: 2 INJECTION, SOLUTION INTRAMUSCULAR; INTRAVENOUS at 13:32

## 2018-01-01 RX ADMIN — CARBIDOPA AND LEVODOPA 1.5 TABLET: 25; 100 TABLET ORAL at 22:43

## 2018-01-01 RX ADMIN — CARBIDOPA AND LEVODOPA 1.5 TABLET: 25; 100 TABLET ORAL at 17:57

## 2018-01-01 RX ADMIN — Medication 10 ML: at 06:00

## 2018-01-01 NOTE — PROGRESS NOTES
Hospitalist Progress Note  Dex Rosado MD  Answering service: [phone] from in house phone         Date of Service:  2018  NAME:  Zoë Cervantes  :  1940  MRN:  [id number]    Admission Summary:      Patient admitted for weight loss and failure to thrive     Interval history / Subjective:           Pt seen at bed side with tech and with wife and children  Pt seems to be able to marifer Sinemetbut but functionally pt is with advanced wt loss, low muscle mass and stiff,    Last CXR of record reviewed by me and agree that no mass noted, but CT is definitive     :  Pt somnolent post wound check and morphine  Wife in room, discussed case  Advise that I have removed the antipsychotic as it may be making things worst for pt in setting of encephalopathy. :   A bit more alert but not clinically significant, wife elects PEG tube,  Has discussed Hospice with Palliative but not quiet ready to pull that trigger at this time this afternoon. GI consulted for peg tube placement. :   No real significant improvement in mental/motor functions. Pt prostrae in bed, ng tube in place, and position documented, on tube feedings,  Seen by GI and will possibly be set up for Peg early next week,   Wife, daughter/friend in room as well as RN on rounds, case discussed with CM      :   Pt pulled NGTube out, replaced, pt placed in non violent Mittts to protect medical  /pt. Discussed with family in room. Pt more alert and spoke some today but  Mostly meaningless verbage.      :  Pt give one word response to question and else opens mouth partially to command, else no meaningful response , marked muscle waisting generalized, cont on tube feeding, I recommended that family pursue peg tube if they plan to continue to desire aggressive code status as in tube feedings and medicines as indicated other than comfort care,  I feel that hospice or at least only  comfort care is the best route to take for this pt at this time. Lab for am , non violent restraints, , case discussed with RN and family     1/1:  Best option for pt is HOSPICE;  family not agreeing to this. No changes and no appreciable improvements over past week. Cont on NGT feeding. ? PEG early in week. Palliative medicine following. Hospice has been considered, would readdress this option over next week. 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 Neurology consulted - continue asa  Speech eval - Recommends NPO for now  NG tube for medications   Dietary consult - started on tube feeding (12/18)  Aspiration Precaution  Little progress, discussed poor prognosis with wife 12/26/2017 - no real I mprovement though 1/1/2018      Paraoxysmal Afib  Now in NSR  New onset (12/20)  Received one dose dig  On [facility]   Cardiology Consulted  Echo showing EF of 65%     Unintentional Weight loss  CT chest showing possible lung nodule/mass - consulted pulmonary  Lung biopsy - Ct guided on 12/18, however Evaluated by radiology and doubt this is a true lung tumor and recommends follow up ct in 6 months  Pulmonary following and defer investigations  For PET CT , ordered 12/26/2017      Failure to thrive and dysphagia with Severe protein calorie nutrition  NG for feeding and medication admistration   Palliative consulted to discuss with family regarding goals of care if family wishes to proceed with peg tube - for now the wife would like to hold off for now  Family wants to give a chance for parkinson medications to work before deciding on peg-      For re-consideration early next week.       UTI  On levaquin since 12/121  Ucx  Pseudomonas and E coli  Change antibiotics to Aztreonam.  WBC 9.8  But both pseudomonas and e.  Coli sense to Levaquin, back on Levaquin to complete a 2 week course of abx. , case discussed with Pharmacy. ( last day 1/4)           Sacral ulcer - unstageable on admission  Sacral, Pressure Injury Unstageable Present on Admission:  7.5 x 5.2 x 0.4 cm 100% soft eschar with 10% pink noted around perimeter.  Periwound hyperpigmented and non blanching; malodor noted; tender to touch; small serous exudate. Wound care consulted  Surgery consulted. Sacral ulcer debridement was done 13/86.     Metabolic encephalopathy from Hypernatremia on admission   Although hypernatremia resolved,mentation not much improved likely due to multiple medical conditions such as advanced Parkinson's disease,dementia,infection,brain infarct. MRI brain showing old pontine infarct  Continue to monitor mentation,doubt will much improve continues, , 1/1/2018 have taken antipsychotic off MAR 2n2 concerns of confusion, EPSE  - no improvement over past 5 days.           Hypernatremia  Resolved  Could be related to reset osmostat in mineralocorticoid use. poor po intake  Hold Florinef  Dextrose IVF  Appreciate renal input  Recent Labs      12/31/17   0411   NA  141          Code status: Full  DVT prophylaxis: SCD     Care Plan discussed with: Patient/Family  Disposition: TBD      12/16 - d/w patients wife and updated on plan, She is concerned about his Parkinson medication, I have called pharmacy and verified its not here. RN will call ED to see if the medication is there, she also reports that he is not on sinemet anymore as he had intolerance to it.   12/17 - updated wife at bedside on plan of care  12/18 - NG placed by nursing however placement was not confirmed, removed  12/19 - Ng placed by IR - Feeding orders given   12/20-  Went into afib new onset. Rate controlled. palliative discussed with patients wife and she wants to leave as full code and all measures done. 12/21 - NSR on monitor. Plan of care discussed with aptients family and they would want full code.    12/22 - Wife in the room with sister and daughter. Plan of care discussed. 12/26  - wife and daughters in room discussed course,   Made family aware of planned PetCT.           Hospital Problems  Date Reviewed: 12/15/2017          Codes Class Noted POA    Metabolic encephalopathy NAL-43-NK: G93.41  ICD-9-CM: 348.31  12/21/2017 Unknown        * (Principal)Weakness ICD-10-CM: R53.1  ICD-9-CM: 780.79  12/15/2017 Yes        Parkinson disease ([facility] [address] (Chronic) ICD-10-CM: G20  ICD-9-CM: 332.0  11/7/2016 Yes                Review of Systems:   Review of systems not obtained due to patient factors. Vital Signs:    Last 24hrs VS reviewed since prior progress note. Most recent are:  Visit Vitals    /57 (BP 1 Location: Left arm, BP Patient Position: At rest)    Pulse 61    Temp 98.9 °F (37.2 °C)    Resp 16    Ht 6' 1\" (1.854 m)    Wt 57.9 kg (127 lb 9.6 oz)    SpO2 99%    BMI 16.83 kg/m2         Intake/Output Summary (Last 24 hours) at 01/01/18 0854  Last data filed at 01/01/18 0535   Gross per 24 hour   Intake             4318 ml   Output             1000 ml   Net             3318 ml        Physical Examination:             Constitutional:  No acute distress,     ENT:  Oral mucous dry - mouth breaths.;  oropharynx w. o mass lesions Neck supple,    Resp:  CTA bilaterally. No wheezing/rhonchi/rales. No accessory muscle use   CV:  Regular rhythm, normal rate, no murmurs, gallops, rubs    GI:  Soft, non distended, non tender. normoactive bowel sounds, no hepatosplenomegaly     Musculoskeletal:  No edema, warm,generalized muscle loss. Neurologic:  non responsive to moving ext on encouragement, non verlba . Psych:  non responsive, non verbal  no agitated not apparently anxious.         Data Review:    Review and/or order of clinical lab test      Labs:     Recent Labs      12/31/17 0411   WBC  9.5   HGB  9.0*   HCT  27.3*   PLT  583*     Recent Labs      12/31/17 0411 12/30/17   0624   NA  141   --    K  4.1 --    CL  105   --    CO2  28   --    BUN  11   --    CREA  0.65*   --    GLU  102*   --    CA  8.1*   --    PHOS   --   3.1     Recent Labs      12/31/17   0411   SGOT  67*   ALT  34   AP  72   TBILI  0.4   TP  6.0*   ALB  1.4*   GLOB  4.6*     No results for input(s): INR, PTP, APTT in the last 72 hours. No lab exists for component: INREXT, INREXT   No results for input(s): FE, TIBC, PSAT, FERR in the last 72 hours. No results found for: FOL, RBCF   No results for input(s): PH, PCO2, PO2 in the last 72 hours. No results for input(s): CPK, CKNDX, TROIQ in the last 72 hours.     No lab exists for component: CPKMB  Lab Results   Component Value Date/Time    Cholesterol, total 106 12/16/2017 05:15 AM    HDL Cholesterol 26 12/16/2017 05:15 AM    LDL, calculated 58.4 12/16/2017 05:15 AM    Triglyceride 108 12/16/2017 05:15 AM    CHOL/HDL Ratio 4.1 12/16/2017 05:15 AM     Lab Results   Component Value Date/Time    Glucose (POC) 107 12/20/2017 11:58 AM    Glucose (POC) 91 11/27/2009 12:20 PM     Lab Results   Component Value Date/Time    Color YELLOW/STRAW 12/21/2017 12:00 AM    Appearance CLOUDY 12/21/2017 12:00 AM    Specific gravity 1.009 12/21/2017 12:00 AM    pH (UA) 6.0 12/21/2017 12:00 AM    Protein NEGATIVE  12/21/2017 12:00 AM    Glucose NEGATIVE  12/21/2017 12:00 AM    Ketone NEGATIVE  12/21/2017 12:00 AM    Bilirubin NEGATIVE  12/21/2017 12:00 AM    Urobilinogen 1.0 12/21/2017 12:00 AM    Nitrites NEGATIVE  12/21/2017 12:00 AM    Leukocyte Esterase MODERATE 12/21/2017 12:00 AM    Epithelial cells FEW 12/21/2017 12:00 AM    Bacteria 4+ 12/21/2017 12:00 AM    WBC 10-20 12/21/2017 12:00 AM    RBC 10-20 12/21/2017 12:00 AM         Medications Reviewed:     Current Facility-Administered Medications   Medication Dose Route Frequency    0.9% sodium chloride infusion  75 mL/hr IntraVENous CONTINUOUS    collagenase (SANTYL) 250 unit/gram ointment   Topical DAILY    glycopyrrolate (ROBINUL) injection 0.2 mg  0.2 mg IntraVENous Q4H PRN    levoFLOXacin (LEVAQUIN) 750 mg in D5W IVPB  750 mg IntraVENous Q24H    balsam peru-castor oil (VENELEX)  mg/gram ointment   Topical BID    sodium hypochlorite (QUARTER STRENGTH DAKIN'S) 0.125% irrigation (bottle)   Topical TID    carbidopa-levodopa (SINEMET)  mg per tablet 1.5 Tab  1.5 Tab Oral QID    pantoprazole (PROTONIX) 2 mg/mL oral suspension 20 mg  20 mg Oral ACB    lidocaine (XYLOCAINE) 2 % jelly   Mucous Membrane PRN    morphine injection 2 mg  2 mg IntraVENous Q4H PRN    aspirin chewable tablet 81 mg  81 mg Oral QHS    polyethylene glycol (MIRALAX) packet 17 g  17 g Oral QHS    latanoprost (XALATAN) 0.005 % ophthalmic solution 1 Drop  1 Drop Both Eyes QHS    sodium chloride (NS) flush 5-10 mL  5-10 mL IntraVENous Q8H    sodium chloride (NS) flush 5-10 mL  5-10 mL IntraVENous PRN    acetaminophen (TYLENOL) tablet 650 mg  650 mg Oral Q4H PRN    Or    acetaminophen (TYLENOL) solution 650 mg  650 mg Per NG tube Q4H PRN    Or    acetaminophen (TYLENOL) suppository 650 mg  650 mg Rectal Q4H PRN    rivastigmine (EXELON) 9.5 mg/24 hr patch 1 Patch  1 Patch TransDERmal DAILY     ______________________________________________________________________  EXPECTED LENGTH OF STAY: 6d 7h  ACTUAL LENGTH OF STAY:          Vasile Alvarado MD

## 2018-01-01 NOTE — PROGRESS NOTES
Bedside shift change report given to Chaparrita Marsh RN (oncoming nurse) by Kp Ventura RN (offgoing nurse). Report included the following information SBAR.

## 2018-01-01 NOTE — PROGRESS NOTES
Bedside and Verbal shift change report given to [address] [place] [address] (oncoming nurse) by [address] (offgoing nurse). Report included the following information SBAR, Kardex and ED Summary.

## 2018-01-01 NOTE — PROGRESS NOTES
Problem: Falls - Risk of  Goal: *Absence of Falls  Document Ora Fall Risk and appropriate interventions in the flowsheet.    Outcome: Progressing Towards Goal  Fall Risk Interventions:  Mobility Interventions: Assess mobility with egress test    Mentation Interventions: Adequate sleep, hydration, pain control    Medication Interventions: Evaluate medications/consider consulting pharmacy    Elimination Interventions: Call light in reach, Toileting schedule/hourly rounds

## 2018-01-01 NOTE — PROGRESS NOTES
Problem: Falls - Risk of  Goal: *Absence of Falls  Document Ora Fall Risk and appropriate interventions in the flowsheet.    Outcome: Progressing Towards Goal  Fall Risk Interventions:  Mobility Interventions: Communicate number of staff needed for ambulation/transfer    Mentation Interventions: Adequate sleep, hydration, pain control, Door open when patient unattended, Bed/chair exit alarm, More frequent rounding    Medication Interventions: Patient to call before getting OOB    Elimination Interventions: Call light in reach, Toileting schedule/hourly rounds

## 2018-01-01 NOTE — PROGRESS NOTES
Bedside shift change report given to [facility], [address] [facility] (oncoming nurse) by Benedict Hernández RN (offgoing nurse). Report included the following information SBAR, Kardex, ED Summary, [facility] and Recent Results.

## 2018-01-02 PROCEDURE — 74011250637 HC RX REV CODE- 250/637: Performed by: HOSPITALIST

## 2018-01-02 PROCEDURE — 74011250637 HC RX REV CODE- 250/637: Performed by: FAMILY MEDICINE

## 2018-01-02 PROCEDURE — 92526 ORAL FUNCTION THERAPY: CPT | Performed by: SPEECH-LANGUAGE PATHOLOGIST

## 2018-01-02 PROCEDURE — 65270000032 HC RM SEMIPRIVATE

## 2018-01-02 PROCEDURE — 97110 THERAPEUTIC EXERCISES: CPT

## 2018-01-02 PROCEDURE — 74011250637 HC RX REV CODE- 250/637: Performed by: PSYCHIATRY & NEUROLOGY

## 2018-01-02 PROCEDURE — 74011250636 HC RX REV CODE- 250/636: Performed by: INTERNAL MEDICINE

## 2018-01-02 RX ADMIN — DAKIN'S SOLUTION 0.125% (QUARTER STRENGTH): 0.12 SOLUTION at 21:00

## 2018-01-02 RX ADMIN — LEVOFLOXACIN 750 MG: 5 INJECTION, SOLUTION INTRAVENOUS at 15:16

## 2018-01-02 RX ADMIN — Medication 10 ML: at 15:16

## 2018-01-02 RX ADMIN — Medication 5 ML: at 22:00

## 2018-01-02 RX ADMIN — Medication 10 ML: at 06:41

## 2018-01-02 RX ADMIN — DAKIN'S SOLUTION 0.125% (QUARTER STRENGTH): 0.12 SOLUTION at 13:37

## 2018-01-02 RX ADMIN — CASTOR OIL AND BALSAM, PERU: 788; 87 OINTMENT TOPICAL at 09:29

## 2018-01-02 RX ADMIN — COLLAGENASE SANTYL: 250 OINTMENT TOPICAL at 13:37

## 2018-01-02 RX ADMIN — SODIUM CHLORIDE 75 ML/HR: 900 INJECTION, SOLUTION INTRAVENOUS at 11:48

## 2018-01-02 RX ADMIN — PANTOPRAZOLE SODIUM 20 MG: 40 TABLET, DELAYED RELEASE ORAL at 08:36

## 2018-01-02 RX ADMIN — LATANOPROST 1 DROP: 50 SOLUTION OPHTHALMIC at 22:00

## 2018-01-02 RX ADMIN — CASTOR OIL AND BALSAM, PERU: 788; 87 OINTMENT TOPICAL at 17:43

## 2018-01-02 RX ADMIN — CARBIDOPA AND LEVODOPA 1.5 TABLET: 25; 100 TABLET ORAL at 08:36

## 2018-01-02 NOTE — PROGRESS NOTES
NUTRITION COMPLETE ASSESSMENT    RECOMMENDATIONS:   1. NPO  -- would not replace NGT at this time and encourage family to make a decision re: PEG vs change in code status so that they may administer comfort feedings     2. Continue daily weight     Interventions/Plan:   Food/Nutrient Delivery: EN support on hold for now until final decision re: PEG placement is made by wife and family. Assessment:   Reason for Assessment:   [x]Reassessment     Diet: NPO (tube feedings were: Jevity 1.5 @ 55 mL/hr + 150 mL flush q 4 hours)  Nutritionally Significant Medications: [x] Reviewed & Includes: sodium chloride @ 75 mL/hr; levaquin daily; protonix daily; miralax q bedtime  Meal Intake: 0% (NPO)    Subjective:  SLP rang out that pt had pulled his NGT. Pt in the bed with NGT on the floor and tube feeding formula on the floor while the pump was running. No family at the bedside during visit. Objective:  Pt has been receiving goal tube feedings (continuous via NGT) as sole source of nutrition. Family has been requesting to wait and see if he improves prior to placing a PEG tube; however, no improvement in swallow noted over the past 18 days. Would not replace the NGT at this time and encourage family to decide whether nutrition support is something they would like to pursue. He may accept comfort feedings, but this is not appropriate while the pt is a full code as he would likely decline. Tube feedings have been providing 1980 kcal, 84 g protein and 1903 mL total free water (tf + flush)-- meeting 100% of estimated needs. Patient meets criteria for Severe Protein Calorie Malnutrition as evidenced by:   ASPEN Malnutrition Criteria  Acute Illness, Chronic Illness, or Social/Enviornmental: Acute illness  Energy Intake: Less than/equal to 50% est energy req for greater than/equal to 5 days-- on admission (he has most recently been on tube feeding)  Body Fat: Moderate  Muscle Mass:  Moderate  ASPEN Malnutrition Score - Acute Illness: 18  Acute Illness - Malnutrition Diagnosis: Severe malnutrition. Estimated Nutrition Needs:   Kcals/day: 2214 Kcals/day (1848--2006kcal)  Protein: 74 g (74-84g (1.4-1.6g/kg))  Fluid: 1900 ml (1ml/kcal)     Based On: Kcal/kg - specify (Comment) (35-38kcal/kg)  Weight Used: Actual wt (52.8kg)    Pt expected to meet estimated nutrient needs:  [x]   No    Nutrition Diagnosis:   1. Malnutrition related to increased protein/energy needs as evidenced by BMI 17, severe cachexia, 63% IBW    2. Inadequate oral intake related to dx progression, poor appetite; dysphagia as evidenced by NGT dependent (now w/o access as he has pulled his NGT)    Goals:     Decision regarding PEG vs comfort within the next 24-48 hours      Monitoring & Evaluation:    - Total energy intake, Enteral/parenteral nutrition intake   - Weight/weight change, Electrolyte and renal profile    Previous Nutrition Goals Met:  N/A  Previous Recommendations:      N/A    Education & Discharge Needs:   [x] None Identified   [] Identified and addressed    [x] Participated in care plan, discharge planning, and/or interdisciplinary rounds        Cultural, Hindu and ethnic food preferences identified:   None    Skin Integrity: []Intact  [x]Other: see pressure injury documentation  Edema: []None [x]Other: 1-4+  Last BM: 1/1/18  Food Allergies: [x]None []Other    Anthropometrics:    Weight Loss Metrics 12/28/2017 4/1/2017 11/11/2016 11/7/2016 6/28/2016 7/12/2015 2/28/2012   Today's Wt 127 lb 9.6 oz 123 lb 118 lb 118 lb 118 lb 118 lb 145 lb   BMI 16.83 kg/m2 16.23 kg/m2 15.57 kg/m2 15.57 kg/m2 15.57 kg/m2 15.57 kg/m2 19.13 kg/m2      Last 3 Recorded Weights in this Encounter    12/25/17 0300 12/26/17 0300 12/28/17 0412   Weight: 59.3 kg (130 lb 11.7 oz) 57 kg (125 lb 9.6 oz) 57.9 kg (127 lb 9.6 oz)      Weight Source: Bed  Height: 6' 1\" (185.4 cm),    Body mass index is 16.83 kg/(m^2).   IBW : 83.5 kg (184 lb), % IBW (Calculated): 63.21 %  Usual Body Weight: 55.8 kg (123 lb),      Labs:    Lab Results   Component Value Date/Time    Sodium 141 12/31/2017 04:11 AM    Potassium 4.1 12/31/2017 04:11 AM    Chloride 105 12/31/2017 04:11 AM    CO2 28 12/31/2017 04:11 AM    Glucose 102 12/31/2017 04:11 AM    BUN 11 12/31/2017 04:11 AM    Creatinine 0.65 12/31/2017 04:11 AM    Calcium 8.1 12/31/2017 04:11 AM    Magnesium 2.3 12/24/2017 03:58 AM    Phosphorus 3.1 12/30/2017 06:24 AM    Albumin 1.4 12/31/2017 04:11 AM     Lab Results   Component Value Date/Time    Hemoglobin A1c 5.6 12/16/2017 05:24 AM     Lab Results   Component Value Date/Time    Glucose 102 12/31/2017 04:11 AM    Glucose (POC) 107 12/20/2017 11:58 AM      Lab Results   Component Value Date/Time    ALT (SGPT) 34 12/31/2017 04:11 AM    AST (SGOT) 67 12/31/2017 04:11 AM    Alk.  phosphatase 72 12/31/2017 04:11 AM    Bilirubin, total 0.4 12/31/2017 04:11 AM      [address]

## 2018-01-02 NOTE — PROGRESS NOTES
Bedside shift change report given to Susana RN (oncoming nurse) by Amna Moya RN (offgoing nurse). Report included the following information SBAR, Kardex, ED Summary, [facility] and Recent Results.

## 2018-01-02 NOTE — PROGRESS NOTES
Hospitalist Progress Note  Tari Balderrama MD  Answering service: [phone] from in house phone  Cell: [phone]      Date of Service:  2018  NAME:  Audelia Chandler  :  1940  MRN:  [id number]      Admission Summary: This is a 72-year-old -American male with a past medical history of hypercholesterolemia, coronary artery disease status post CABG, Parkinson disease, prostate cancer status post prostatectomy, who comes over here after being sent by the home health nurse for abnormal labs. Patient was admitted for weight loss and failure to thrive. Interval history / Subjective:       F/u for failure to thrive. No acute overnight events. Patient pulled out is NG tube   He is minimally verbally responsive. He says he is fine. I had a long discussion with wife who says she will consent to PEG tube insertion. Assessment & Plan:     Failure to thrive with Dysphagia  Severe protein Caloric restriction  He pulled out NG tube. Wife is now agreeable to PEG tube insertion. UTI: Completed antibiotic course    Sacral decubitus Ulcer: Un stageable on admission  Continue wound care    Metabolic encephalopathy: POA, sec to hypernatremia. 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 Patient is rate controlled. On Aspirin    Parkinson's disease with functional quadriplegia:  Continue every 2 hour turning.    On Sinemet     Anemia:Secondary to malnutrition  H/h stable    Diet : Keep NPO  Gentle NS IVF hydration    Code status: Full  DVT prophylaxis:     Care Plan discussed with: Patient/Family and Nurse  Disposition: TBD     Hospital Problems  Date Reviewed: 12/15/2017          Codes Class Noted POA    Metabolic encephalopathy [id number]: G93.41  ICD-9-CM: 348.31  2017 Unknown        * (Principal)Weakness ICD-10-CM: R53.1  ICD-9-CM: 780.79  12/15/2017 Yes        Parkinson disease ([facility] [address] (Chronic) ICD-10-CM: G20  ICD-9-CM: 332.0  11/7/2016 Yes                Review of Systems:   Pertinent items are noted in HPI. Vital Signs:    Last 24hrs VS reviewed since prior progress note. Most recent are:  Visit Vitals    /84 (BP 1 Location: Right arm, BP Patient Position: At rest)    Pulse 72    Temp 98.7 °F (37.1 °C)    Resp 16    Ht 6' 1\" (1.854 m)    Wt 57.9 kg (127 lb 9.6 oz)    SpO2 96%    BMI 16.83 kg/m2         Intake/Output Summary (Last 24 hours) at 01/02/18 1451  Last data filed at 01/02/18 0655   Gross per 24 hour   Intake             1420 ml   Output             2050 ml   Net             -630 ml        Physical Examination:             Constitutional:  No acute distress, cooperative, cachectic   ENT:  Oral mucous dry, oropharynx benign. Neck supple,    Resp:  CTA bilaterally. No wheezing/rhonchi/rales. No accessory muscle use   CV:  Regular rhythm, normal rate, no murmurs, gallops, rubs    GI:  Soft, non distended, non tender. normoactive bowel sounds, no hepatosplenomegaly     Musculoskeletal:  +2 edema, warm, 2+ pulses throughout    Neurologic:  Moves all extremities. AAOx3, CN II-XII reviewed    Skin: +ve sacral decubitus ulcer              Ext: Bila upper and lower extremity contractures. Both feet on booths. Data Review:          Labs:     Recent Labs      12/31/17 0411   WBC  9.5   HGB  9.0*   HCT  27.3*   PLT  583*     Recent Labs      12/31/17 0411   NA  141   K  4.1   CL  105   CO2  28   BUN  11   CREA  0.65*   GLU  102*   CA  8.1*     Recent Labs      12/31/17 0411   SGOT  67*   ALT  34   AP  72   TBILI  0.4   TP  6.0*   ALB  1.4*   GLOB  4.6*     No results for input(s): INR, PTP, APTT in the last 72 hours. No lab exists for component: INREXT   No results for input(s): FE, TIBC, PSAT, FERR in the last 72 hours.    No results found for: FOL, RBCF   No results for input(s): PH, PCO2, PO2 in the last 72 hours. No results for input(s): CPK, CKNDX, TROIQ in the last 72 hours.     No lab exists for component: CPKMB  Lab Results   Component Value Date/Time    Cholesterol, total 106 12/16/2017 05:15 AM    HDL Cholesterol 26 12/16/2017 05:15 AM    LDL, calculated 58.4 12/16/2017 05:15 AM    Triglyceride 108 12/16/2017 05:15 AM    CHOL/HDL Ratio 4.1 12/16/2017 05:15 AM     Lab Results   Component Value Date/Time    Glucose (POC) 107 12/20/2017 11:58 AM    Glucose (POC) 91 11/27/2009 12:20 PM     Lab Results   Component Value Date/Time    Color YELLOW/STRAW 12/21/2017 12:00 AM    Appearance CLOUDY 12/21/2017 12:00 AM    Specific gravity 1.009 12/21/2017 12:00 AM    pH (UA) 6.0 12/21/2017 12:00 AM    Protein NEGATIVE  12/21/2017 12:00 AM    Glucose NEGATIVE  12/21/2017 12:00 AM    Ketone NEGATIVE  12/21/2017 12:00 AM    Bilirubin NEGATIVE  12/21/2017 12:00 AM    Urobilinogen 1.0 12/21/2017 12:00 AM    Nitrites NEGATIVE  12/21/2017 12:00 AM    Leukocyte Esterase MODERATE 12/21/2017 12:00 AM    Epithelial cells FEW 12/21/2017 12:00 AM    Bacteria 4+ 12/21/2017 12:00 AM    WBC 10-20 12/21/2017 12:00 AM    RBC 10-20 12/21/2017 12:00 AM         Medications Reviewed:     Current Facility-Administered Medications   Medication Dose Route Frequency    0.9% sodium chloride infusion  75 mL/hr IntraVENous CONTINUOUS    collagenase (SANTYL) 250 unit/gram ointment   Topical DAILY    glycopyrrolate (ROBINUL) injection 0.2 mg  0.2 mg IntraVENous Q4H PRN    levoFLOXacin (LEVAQUIN) 750 mg in D5W IVPB  750 mg IntraVENous Q24H    balsam peru-castor oil (VENELEX)  mg/gram ointment   Topical BID    sodium hypochlorite (QUARTER STRENGTH DAKIN'S) 0.125% irrigation (bottle)   Topical TID    carbidopa-levodopa (SINEMET)  mg per tablet 1.5 Tab  1.5 Tab Oral QID    pantoprazole (PROTONIX) 2 mg/mL oral suspension 20 mg  20 mg Oral ACB    lidocaine (XYLOCAINE) 2 % jelly   Mucous Membrane PRN    morphine injection 2 mg  2 mg IntraVENous Q4H PRN    aspirin chewable tablet 81 mg  81 mg Oral QHS    polyethylene glycol (MIRALAX) packet 17 g  17 g Oral QHS    latanoprost (XALATAN) 0.005 % ophthalmic solution 1 Drop  1 Drop Both Eyes QHS    sodium chloride (NS) flush 5-10 mL  5-10 mL IntraVENous Q8H    sodium chloride (NS) flush 5-10 mL  5-10 mL IntraVENous PRN    acetaminophen (TYLENOL) tablet 650 mg  650 mg Oral Q4H PRN    Or    acetaminophen (TYLENOL) solution 650 mg  650 mg Per NG tube Q4H PRN    Or    acetaminophen (TYLENOL) suppository 650 mg  650 mg Rectal Q4H PRN    rivastigmine (EXELON) 9.5 mg/24 hr patch 1 Patch  1 Patch TransDERmal DAILY     ______________________________________________________________________  EXPECTED LENGTH OF STAY: 6d 7h  ACTUAL LENGTH OF STAY:          18                 Bernadette Johnson MD

## 2018-01-02 NOTE — PROGRESS NOTES
Problem: Falls - Risk of  Goal: *Absence of Falls  Document Ora Fall Risk and appropriate interventions in the flowsheet.    Outcome: Progressing Towards Goal  Fall Risk Interventions:  Mobility Interventions: Communicate number of staff needed for ambulation/transfer    Mentation Interventions: Adequate sleep, hydration, pain control    Medication Interventions: Evaluate medications/consider consulting pharmacy    Elimination Interventions: Call light in reach, Toileting schedule/hourly rounds

## 2018-01-02 NOTE — PROGRESS NOTES
Problem: Dysphagia (Adult)  Goal: *Acute Goals and Plan of Care (Insert Text)  Speech therapy goals  Initiated 12/18/2017; continued 12/26/2017   1. Patient will participate in re-assessment of swallow function within 7 days; discontinue 1/2/2018   Speech language pathology dysphagia treatment/discharge  Patient: Governor Ponce (13 y.o. male)  Date: 1/2/2018  Diagnosis: Weakness  Weakness  SACRAL ABSCESS Weakness  Procedure(s) (LRB):  DEBRIDEMENT OF SACRAL ULCER  REQ 1000 (N/A) 11 Days Post-Op  Precautions: aspiration, fall       ASSESSMENT:  Patient with no change in function compared to initial evaluation completed 12/18. He presents with severe oral and at least mod/severe pharyngeal dysphagia with overt s/s of aspiration and multiple, audible and uncoordinated swallows with both ice chip and saliva. He is at significant risk for aspiration at this time and not appropriate for consideration of PO. Of note, on SLP entry to room, patient with Dobbhoff tube on floor. Patient had bilateral mittens still on, but pulled his own tube between staff members being in the room. nsg called to room and MD notified. Progression toward goals:  []           Improving appropriately and progressing toward goals  []           Improving slowly and progressing toward goals  [x]           Not making progress toward goals and plan of care will be adjusted     PLAN:  Patient has made no progress towards goals and remains unsafe for PO. Recommend continued discussions regarding goals of care. ?hospice with comfort feeds vs. PEG tube. If choice is for comfort feeds, would recommend puree/thin with understanding of risks of aspiration. Will sign off SLP services at this time due to no progress. Patient will be discharged from speech therapy at this time.   Rationale for discharge:  []      Goals Achieved  [x]      Plateau Reached  [x]      Patient not participating in therapy  []      Other:  Discharge Recommendations:  None SUBJECTIVE:   Patient alert, unintelligible muttering. OBJECTIVE:   Cognitive and Communication Status:  Neurologic State: Alert, Confused  Orientation Level: Unable to verbalize  Cognition: No command following    Perception: Cues to attend to left side of body, Cues to attend to right side of body, Cues to attend right visual field, Cues to attend left visual field    Perseveration: No perseveration noted    Safety/Judgement: Not assessed  Dysphagia Treatment:  Oral Assessment:  Oral Assessment  Labial:  (did not follow commands to assess )  P.O. Trials:  Patient Position: upright in bed   Vocal quality prior to P.O.:    Consistency Presented: Ice chips  How Presented: SLP-fed/presented;Spoon     Bolus Acceptance: Absent (ice placed in mouth )  Bolus Formation/Control: Impaired  Type of Impairment: Delayed (minimal manipulation)  Propulsion: Delayed (# of seconds); Discoordination (mostly passive)  Oral Residue: None  Initiation of Swallow: Delayed (# of seconds)  Laryngeal Elevation: Decreased;Weak  Aspiration Signs/Symptoms: Weak cough; Change vocal quality  Pharyngeal Phase Characteristics: Audible swallow;Multiple swallows; Suspected pharyngeal residue; Altered vocal quality  Effective Modifications: None     Comments: no improvement in function since initial evaluation   Oral Phase Severity: Severe  Pharyngeal Phase Severity : Moderate-severe (at least )                                                                                                                G Codes: In compliance with CMSs Claims Based Outcome Reporting, the following G-code set was chosen for this patient based the use of the NOMS functional outcome to quantify this patient's level of swallowing impairment. Using the NOMS, the patient was determined to be at level 1 for swallow function which correlates with the CN= 100% level of severity.     Based on the objective assessment provided within this note, the current, goal, and discharge g-codes are as follows:    Swallow  Swallowing:   Swallow Goal Status CL= 60-79%   Swallow D/C Status CN= 100%      NOMS Swallowing Levels:  Level 1 (CN): NPO  Level 2 (CM): NPO but takes consistency in therapy  Level 3 (CL): Takes less than 50% of nutrition p.o. and continues with nonoral feedings; and/or safe with mod cues; and/or max diet restriction  Level 4 (CK): Safe swallow but needs mod cues; and/or mod diet restriction; and/or still requires some nonoral feeding/supplements  Level 5 (CJ): Safe swallow with min diet restriction; and/or needs min cues  Level 6 (CI): Independent with p.o.; rare cues; usually self cues; may need to avoid some foods or needs extra time  Level 7 ([address] [place] [address]): Independent for all p.o.  ALVINO. (2003). National Outcomes Measurement System (NOMS): Adult Speech-Language Pathology User's Guide. Pain:  Pain Scale 1: Adult Nonverbal Pain Scale  Pain Intensity 1: 0     After treatment:   []                Patient left in no apparent distress sitting up in chair  [x]                Patient left in no apparent distress in bed  [x]                Call bell left within reach  [x]                Nursing notified  [x]                Caregiver present  []                Bed alarm activated    COMMUNICATION/EDUCATION:         Victor Manuel Joe M.CD.  CCC-SLP   Time Calculation: 14 mins

## 2018-01-02 NOTE — PROGRESS NOTES
Patient discharge is planned for tomorrow following PEG Tube placement. EMMA spoke with the facility liaison Amy Grey [phone]) to advise of plans. Patient to be admitted to the [facility]. .  Ambulance transport requested for 3pm Wednesday 1/3/18. Packet left at [facility].   Chiara Huertas, BSW, CRM

## 2018-01-02 NOTE — PROGRESS NOTES
Problem: Falls - Risk of  Goal: *Absence of Falls  Document Ora Fall Risk and appropriate interventions in the flowsheet.    Outcome: Progressing Towards Goal  Fall Risk Interventions:  Mobility Interventions: Communicate number of staff needed for ambulation/transfer    Mentation Interventions: Adequate sleep, hydration, pain control, Door open when patient unattended, More frequent rounding    Medication Interventions: Evaluate medications/consider consulting pharmacy    Elimination Interventions: Call light in reach, Toileting schedule/hourly rounds

## 2018-01-02 NOTE — PROGRESS NOTES
Problem: Self Care Deficits Care Plan (Adult)  Goal: *Acute Goals and Plan of Care (Insert Text)  Occupational Therapy Goals:  Goals reviewed and downgraded to reflect current status: 12/26/17  1. Patient will perform 1 simple grooming task with overall Max A x1 within 7 days. 2.  Patient will participate in anterior upper body bathing with overall Max A x1 within 7 days. 3.  Patient will perform rolling in bed with Max A x2 in preparation for toileting and hygiene within 7 days. 4.  Patient will complete toilet transfer with overall Max A x2 within 7 days. Initiated 12/18/2017  1. Patient will perform simple grooming tasks with overall Mod A x1 within 7 days. 2.  Patient will participate in anterior upper body bathing with overall Mod A x1 within 7 days. 3.  Patient will perform rolling in bed with Mod A x2 in preparation for toileting and hygiene within 7 days. 4.  Patient will complete toilet transfer with overall Mod A x2 within 7 days. Occupational Therapy TREATMENT/discharge  Patient: Didi Martinez (70 y.o. male)  Date: 1/2/2018  Diagnosis: Weakness  Weakness  SACRAL ABSCESS Weakness  Procedure(s) (LRB):  DEBRIDEMENT OF SACRAL ULCER  REQ 1000 (N/A) 11 Days Post-Op  Precautions:    Chart, occupational therapy assessment, plan of care, and goals were reviewed. ASSESSMENT:  Patient received in fetal position in bed with wife and wife's sister present. Wife instructed in PROM within limits of patient's rigidity for bilateral UEs as well as hip and knee extension without force or pressure. Patient relaxes into movement with extra time, but within limits of <50% of range. Wife also instructed in positioning patient in bed with demonstration for all of the above. Wife states she is familiar with PROM from history of home therapy for patient in the past. She reports she is comfortable with assisting patient to prevent contractures and allow for hygiene and dressing needs.  Wife with no further questions or concerns. Patient is nonverbal, with no command following, and rigidity throughout extremities. No progress toward goals or goals met. Will discharge from OT at this time. Progression toward goals:  []          Improving appropriately and progressing toward goals  []          Improving slowly and progressing toward goals  []          Not making progress toward goals and plan of care will be adjusted     PLAN:  Patient will be discharged from occupational therapy at this time. Rationale for discharge:  [] Goals Achieved  [] [address]  [] Patient not participating in therapy  [x] Other:No progress toward goals. Discharge Recommendations:  LTC Xavier, 24/7 care  Further Equipment Recommendations for Discharge:  none     SUBJECTIVE:   Patient stated : Nonverbal    OBJECTIVE DATA SUMMARY:   Cognitive/Behavioral Status:  Neurologic State: Eyes open to stimulus  Orientation Level: Unable to verbalize  Cognition: No command following, Decreased attention/concentration  Safety/Judgement: Decreased awareness of environment  Functional Mobility and Transfers for ADLs:   Bed Mobility:  Rolling: Total assistance;Assist x2           ADL Intervention:            Upper Body Bathing  Bathing Assistance: Total assistance(dependent) (inferred from mobility and command following)    Lower Body Bathing  Bathing Assistance: Total assistance(dependent) (inferred from mobility and command following)       Cognitive Retraining  Safety/Judgement: Decreased awareness of environment    Neuro Re-Education:           Wife instructed in removing noise and outside stimulus to work with patient and telling him what she is doing before she does it. Therapeutic Exercises:   PROM bilateral UEs and hip and knee extension as patient positions in fetal position         Activity Tolerance:    Fair  Please refer to the flowsheet for vital signs taken during this treatment.   After treatment:   []  Patient left in no apparent distress sitting up in chair  [x]  Patient left in no apparent distress in bed  [x]  Call bell left within reach  [x]  Nursing notified  [x]  Caregiver present(Wife and sister)  []  Bed alarm activated    COMMUNICATION/COLLABORATION:   The patients plan of care was discussed with: Occupational Therapist and Registered Nurse    ALIE Carver  Time Calculation: 17 mins

## 2018-01-02 NOTE — PROGRESS NOTES
[address]  [address] [facility], [address]       GI PROGRESS NOTE  Will Laz Mcgregor  [phone] office  [phone] NP/PA in-hospital cell phone M-F until 4:30PM  After 5PM or on weekends, please call  for physician on call      NAME: Angie Allen   :  1940   MRN:  [id number]       Subjective:   Patient pulled out Dobhoff today. Bilateral mittens in place. There is no family at the bedside. Objective:     VITALS:   Last 24hrs VS reviewed since prior progress note. Most recent are:  Visit Vitals    /57 (BP 1 Location: Right arm, BP Patient Position: Head of bed elevated (Comment degrees); At rest)    Pulse 64    Temp 97.8 °F (36.6 °C)    Resp 15    Ht 6' 1\" (1.854 m)    Wt 57.9 kg (127 lb 9.6 oz)    SpO2 96%    BMI 16.83 kg/m2       PHYSICAL EXAM:  General: No acute distress  Neurologic:  Drowsy, nonverbal  HEENT: Intermittent eye opening, no scleral icterus  Lungs:  CTA bilaterally anteriorly  Heart:  Regular rate and rhythm  Abdomen: Soft, non-distended, no tenderness, no guarding, no rebound. +Bowel sounds. Extremities: Warm  Psych:   Unable to assess    Lab Data Reviewed:     No results found for this or any previous visit (from the past 24 hour(s)). Assessment:   · Moderate/severe oropharyngeal dysphagia: speech language pathology re-evaluated and recommends ?hospice with comfort feeds vs. PEG tube. Speech language pathology signed off. · Failure to thrive: severe protein calorie malnutrition; Dobhoff tube pulled out. Patient Active Problem List   Diagnosis Code    Chest pain R07.9    Parkinson disease ([facility] [address] G20    Weakness L93.3    Metabolic encephalopathy N20.47     Plan:   · Further plans pending family's plan of care for the patient     Signed By: Mary Cormier     2018  12:43 PM       I have examined the patient.   I have reviewed the chart and agree with the documentation recorded by the NP, including the assessment, treatment plan, and disposition.     Awaiting family decision on PEG tube placement or not    Yanni Vines MD

## 2018-01-02 NOTE — PROGRESS NOTES
Was informed by speech therapist that patient had pulled out NGT. Patient has mitten restraints on both hands and was laying on his right side. When the therapist entered the room, she noticed the NGT on the floor. Informed Dr. Davida Christensen who states that he will evaluate the patient and speak with the family again about the plan of care for the patient. Per speech therapist, patient is not safe to eat at this time and continues to fail swallow evals.

## 2018-01-02 NOTE — PROGRESS NOTES
Bedside and Verbal shift change report given to Ashkan Booth RN (oncoming nurse) by BALJIT Meza (offgoing nurse). Report included the following information SBAR, Kardex, Intake/Output, MAR and Recent Results.

## 2018-01-03 ENCOUNTER — ANESTHESIA (OUTPATIENT)
Dept: ENDOSCOPY | Age: 78
DRG: 981 | End: 2018-01-03
Payer: MEDICARE

## 2018-01-03 ENCOUNTER — ANESTHESIA EVENT (OUTPATIENT)
Dept: ENDOSCOPY | Age: 78
DRG: 981 | End: 2018-01-03
Payer: MEDICARE

## 2018-01-03 PROCEDURE — 76040000019: Performed by: INTERNAL MEDICINE

## 2018-01-03 PROCEDURE — 74011000250 HC RX REV CODE- 250

## 2018-01-03 PROCEDURE — 74011250636 HC RX REV CODE- 250/636

## 2018-01-03 PROCEDURE — 74011250637 HC RX REV CODE- 250/637: Performed by: FAMILY MEDICINE

## 2018-01-03 PROCEDURE — 0DH63UZ INSERTION OF FEEDING DEVICE INTO STOMACH, PERCUTANEOUS APPROACH: ICD-10-PCS | Performed by: INTERNAL MEDICINE

## 2018-01-03 PROCEDURE — 74011250636 HC RX REV CODE- 250/636: Performed by: INTERNAL MEDICINE

## 2018-01-03 PROCEDURE — 74011250637 HC RX REV CODE- 250/637: Performed by: HOSPITALIST

## 2018-01-03 PROCEDURE — 76060000031 HC ANESTHESIA FIRST 0.5 HR: Performed by: INTERNAL MEDICINE

## 2018-01-03 PROCEDURE — 74011250637 HC RX REV CODE- 250/637: Performed by: PSYCHIATRY & NEUROLOGY

## 2018-01-03 PROCEDURE — 77030005123 HC CATH GASTMY PEG BSC -C: Performed by: INTERNAL MEDICINE

## 2018-01-03 PROCEDURE — 77030012058: Performed by: INTERNAL MEDICINE

## 2018-01-03 PROCEDURE — 65270000032 HC RM SEMIPRIVATE

## 2018-01-03 PROCEDURE — 77030011256 HC DRSG MEPILEX <16IN NO BORD MOLN -A

## 2018-01-03 RX ORDER — PROPOFOL 10 MG/ML
INJECTION, EMULSION INTRAVENOUS AS NEEDED
Status: DISCONTINUED | OUTPATIENT
Start: 2018-01-03 | End: 2018-01-03 | Stop reason: HOSPADM

## 2018-01-03 RX ORDER — SODIUM CHLORIDE 9 MG/ML
100 INJECTION, SOLUTION INTRAVENOUS CONTINUOUS
Status: DISPENSED | OUTPATIENT
Start: 2018-01-03 | End: 2018-01-03

## 2018-01-03 RX ORDER — EPINEPHRINE 0.1 MG/ML
1 INJECTION INTRACARDIAC; INTRAVENOUS
Status: DISCONTINUED | OUTPATIENT
Start: 2018-01-03 | End: 2018-01-03 | Stop reason: HOSPADM

## 2018-01-03 RX ORDER — FLUMAZENIL 0.1 MG/ML
0.2 INJECTION INTRAVENOUS
Status: DISCONTINUED | OUTPATIENT
Start: 2018-01-03 | End: 2018-01-03 | Stop reason: HOSPADM

## 2018-01-03 RX ORDER — MIDAZOLAM HYDROCHLORIDE 1 MG/ML
.25-1 INJECTION, SOLUTION INTRAMUSCULAR; INTRAVENOUS
Status: DISCONTINUED | OUTPATIENT
Start: 2018-01-03 | End: 2018-01-03 | Stop reason: HOSPADM

## 2018-01-03 RX ORDER — FENTANYL CITRATE 50 UG/ML
200 INJECTION, SOLUTION INTRAMUSCULAR; INTRAVENOUS
Status: DISCONTINUED | OUTPATIENT
Start: 2018-01-03 | End: 2018-01-03 | Stop reason: HOSPADM

## 2018-01-03 RX ORDER — DEXTROMETHORPHAN/PSEUDOEPHED 2.5-7.5/.8
1.2 DROPS ORAL
Status: DISCONTINUED | OUTPATIENT
Start: 2018-01-03 | End: 2018-01-03 | Stop reason: HOSPADM

## 2018-01-03 RX ORDER — NALOXONE HYDROCHLORIDE 0.4 MG/ML
0.4 INJECTION, SOLUTION INTRAMUSCULAR; INTRAVENOUS; SUBCUTANEOUS
Status: DISCONTINUED | OUTPATIENT
Start: 2018-01-03 | End: 2018-01-03 | Stop reason: HOSPADM

## 2018-01-03 RX ORDER — ATROPINE SULFATE 0.1 MG/ML
0.5 INJECTION INTRAVENOUS
Status: DISCONTINUED | OUTPATIENT
Start: 2018-01-03 | End: 2018-01-03 | Stop reason: HOSPADM

## 2018-01-03 RX ORDER — SODIUM CHLORIDE 9 MG/ML
INJECTION, SOLUTION INTRAVENOUS
Status: DISCONTINUED | OUTPATIENT
Start: 2018-01-03 | End: 2018-01-03 | Stop reason: HOSPADM

## 2018-01-03 RX ORDER — SODIUM CHLORIDE 0.9 % (FLUSH) 0.9 %
5-10 SYRINGE (ML) INJECTION EVERY 8 HOURS
Status: ACTIVE | OUTPATIENT
Start: 2018-01-03 | End: 2018-01-03

## 2018-01-03 RX ORDER — SODIUM CHLORIDE 0.9 % (FLUSH) 0.9 %
5-10 SYRINGE (ML) INJECTION AS NEEDED
Status: ACTIVE | OUTPATIENT
Start: 2018-01-03 | End: 2018-01-03

## 2018-01-03 RX ORDER — LIDOCAINE HYDROCHLORIDE 20 MG/ML
INJECTION, SOLUTION EPIDURAL; INFILTRATION; INTRACAUDAL; PERINEURAL AS NEEDED
Status: DISCONTINUED | OUTPATIENT
Start: 2018-01-03 | End: 2018-01-03 | Stop reason: HOSPADM

## 2018-01-03 RX ADMIN — SODIUM CHLORIDE 75 ML/HR: 900 INJECTION, SOLUTION INTRAVENOUS at 02:00

## 2018-01-03 RX ADMIN — PROPOFOL 25 MG: 10 INJECTION, EMULSION INTRAVENOUS at 14:25

## 2018-01-03 RX ADMIN — POLYETHYLENE GLYCOL 3350 17 G: 17 POWDER, FOR SOLUTION ORAL at 21:02

## 2018-01-03 RX ADMIN — SODIUM CHLORIDE: 9 INJECTION, SOLUTION INTRAVENOUS at 14:17

## 2018-01-03 RX ADMIN — LATANOPROST 1 DROP: 50 SOLUTION OPHTHALMIC at 21:02

## 2018-01-03 RX ADMIN — PROPOFOL 25 MG: 10 INJECTION, EMULSION INTRAVENOUS at 14:30

## 2018-01-03 RX ADMIN — Medication 5 ML: at 06:00

## 2018-01-03 RX ADMIN — DAKIN'S SOLUTION 0.125% (QUARTER STRENGTH): 0.12 SOLUTION at 06:00

## 2018-01-03 RX ADMIN — CASTOR OIL AND BALSAM, PERU: 788; 87 OINTMENT TOPICAL at 19:03

## 2018-01-03 RX ADMIN — ACETAMINOPHEN 650 MG: 650 SOLUTION ORAL at 21:03

## 2018-01-03 RX ADMIN — PROPOFOL 25 MG: 10 INJECTION, EMULSION INTRAVENOUS at 14:26

## 2018-01-03 RX ADMIN — DAKIN'S SOLUTION 0.125% (QUARTER STRENGTH): 0.12 SOLUTION at 21:02

## 2018-01-03 RX ADMIN — PROPOFOL 50 MG: 10 INJECTION, EMULSION INTRAVENOUS at 14:22

## 2018-01-03 RX ADMIN — ASPIRIN 81 MG 81 MG: 81 TABLET ORAL at 20:58

## 2018-01-03 RX ADMIN — PROPOFOL 25 MG: 10 INJECTION, EMULSION INTRAVENOUS at 14:34

## 2018-01-03 RX ADMIN — DAKIN'S SOLUTION 0.125% (QUARTER STRENGTH): 0.12 SOLUTION at 13:00

## 2018-01-03 RX ADMIN — LIDOCAINE HYDROCHLORIDE 100 MG: 20 INJECTION, SOLUTION EPIDURAL; INFILTRATION; INTRACAUDAL; PERINEURAL at 14:27

## 2018-01-03 RX ADMIN — SODIUM CHLORIDE 75 ML/HR: 900 INJECTION, SOLUTION INTRAVENOUS at 19:00

## 2018-01-03 RX ADMIN — CARBIDOPA AND LEVODOPA 1.5 TABLET: 25; 100 TABLET ORAL at 19:01

## 2018-01-03 RX ADMIN — Medication 10 ML: at 21:02

## 2018-01-03 NOTE — PROGRESS NOTES

## 2018-01-03 NOTE — PROGRESS NOTES
Bedside shift change report given to Jacques Edwards (oncoming nurse) by Reyna Snider (offgoing nurse). Report included the following information SBAR.

## 2018-01-03 NOTE — PROGRESS NOTES
TRANSFER - IN REPORT:    Verbal report received from [facility]) on Wade Flores  being received from ENDO(unit) for routine post - op      Report consisted of patients Situation, Background, Assessment and   Recommendations(SBAR). Information from the following report(s) SBAR was reviewed with the receiving nurse. Opportunity for questions and clarification was provided. Assessment completed upon patients arrival to unit and care assumed.

## 2018-01-03 NOTE — PROGRESS NOTES
CM continuing to follow for d/c planning. Patient discharge scheduled for Thursday 1/4/18. Patient  Plan of care today for peg tube placement at 2pm.  Family aware and at bedside. CM notified Annita Kellerbettye Lund) of plans for tomorrow. Patient's wife advised that patient has received a new insurance card and that member ID number has changed. CM forwarding information to appropriate dept for billing and made copies for wife for further needs. PABLO Sandhu, CRM       3:57p  Transport time rescheduled for 11am tomorrow (1/4/18) per this CM's discussion with facility liaison. Family will be notified.   PABLO Sandhu, CRM

## 2018-01-03 NOTE — PROGRESS NOTES
TRANSFER - OUT REPORT:    Verbal report given to [facility], [facility] RN(name) on Michael Dobbins  being transferred to [facility](unit) for ordered procedure       Report consisted of patients Situation, Background, Assessment and   Recommendations(SBAR). Information from the following report(s) SBAR, Procedure Summary and Recent Results was reviewed with the receiving nurse. Lines:   Peripheral IV 12/31/17 Left Arm (Active)   Site Assessment Clean, dry, & intact; Clean 1/3/2018  8:43 AM   Phlebitis Assessment 0 1/3/2018  8:43 AM   Infiltration Assessment 0 1/3/2018  8:43 AM   Dressing Status Clean, dry, & intact 1/3/2018  8:43 AM   Dressing Type Transparent 1/3/2018  8:43 AM   Hub Color/Line Status Blue 1/3/2018  8:43 AM   Action Taken Open ports on tubing capped 1/3/2018  8:43 AM   Alcohol Cap Used Yes 1/3/2018  8:43 AM        Opportunity for questions and clarification was provided. Patient transported with: [facility] transport.

## 2018-01-03 NOTE — ANESTHESIA PREPROCEDURE EVALUATION
Anesthetic History   No history of anesthetic complications            Review of Systems / Medical History  Patient summary reviewed, nursing notes reviewed and pertinent labs reviewed    Pulmonary  Within defined limits                 Neuro/Psych       CVA       Cardiovascular    Hypertension          CAD and CABG         GI/Hepatic/Renal  Within defined limits              Endo/Other  Within defined limits           Other Findings   Comments: Parkinson         Physical Exam    Airway  Mallampati: II  TM Distance: > 6 cm  Neck ROM: normal range of motion   Mouth opening: Normal     Cardiovascular  Regular rate and rhythm,  S1 and S2 normal,  no murmur, click, rub, or gallop             Dental  No notable dental hx       Pulmonary  Breath sounds clear to auscultation               Abdominal  GI exam deferred       Other Findings            Anesthetic Plan    ASA: 3  Anesthesia type: MAC          Induction: Intravenous  Anesthetic plan and risks discussed with: Patient and Healthcare power of

## 2018-01-03 NOTE — PROCEDURES
[address]  174 [facility], [address]      PEG ENDOSCOPY    NAME: Jose Elias Saleh   :  1940   MRN:  [id number]       Procedure Type:   EGD and PEG tube     Indications:  dysphagia, malnutrition     Pre-operative Diagnosis: see indication above    Post-operative Diagnosis:  See findings below    :  Roge Meraz MD    Referring Provider: Robyn Hess MD      Sedation:  MAC anesthesia Propofol, levaquin 750 mgiv     Prior to the procedure its objectives, risks, consequences and alternatives were discussed with the patient who then elected to proceed. The patient had the opportunity to ask questions and those questions were answered. A physical exam was performed. The heart, lungs, and mental status were examined prior to the procedure and found to be satisfactory for conscious sedation and for the procedure. Conscious sedation was initiated by the physician. Continuous pulse oximetry and blood pressure monitoring were used throughout the procedure. After appropriate pharyngeal anesthesia, the endoscope was passed into the esophagus without difficulty. The proximal esophagus is normal as is the distal esophagus. The fundus, body, antrum, pylorus, bulb and postbulbar area are unremarkable. On slow withdrawal of the scope, the stomach was transilluminated into the abdominal wall. Under sterile conditions and 1% Xylocaine anesthesia, a small incision was made in the abdominal wall. A needle was passed through the incision, and under direct vision into the stomach. A wire was passed through the needle, snared and brought out the mouth. The PEG tube was passed over the wire and brought out the abdominal wall without difficulty. The scope was then reinserted and the positioning of the PEG tube was excellent. The patient tolerated the procedure without complication and will return to the room in satisfactory condition.     Specimen Removed: none    Complications: None. EBL:  None.     Impression: successful successful placement of 20 Wolof PEG tube IsantiHackSurfer) in stomach using the push technique     Recommendations:   Resume TF tonight  Discussed results with pt's wife    Signed By: Ethel Vogel MD     1/3/2018  2:50 PM

## 2018-01-03 NOTE — WOUND CARE
WOCN Note:       Follow up assessment of sacral ulcer. Assessed with Luis Miguel Enrique RN      Chart reviewed. Admitted DX: Rachel Dumont  Past Medical History:  hypercholesterolemia, coronary artery disease status post CABG, Parkinson disease, prostate cancer status post prostatectomy  Admitted from home.      S/P debridement of sacral wound on 12.22.17 by Dr Brady Andrew     Assessment:   Patient is drowsy, contracted, and requires assist of 2 with repositioning.    Patient wearing briefs for incontinence and has a condom catheter.    All areas are present on admission.      Bilateral heels skin intact and without erythema. Left lateral malleolus hyperpigmented area. Left medial knee hypopigmented area. Left knee, dry scab. 1.  Right shoulder, partial thickness wound; 1 x 1.5 x 0.1 cm; 100% yellow; cleansed and applied Xeroform with bordered foam.  2.  Right knee, partial thickness wounds; 1.5 x 1.3 x 0.1 cm; 100% pink;  Cleansed and applied xeroform with bordered foam.  3.  Left trochanter, fluid filled bullae over hyperpigmented skin. 4.  Right trochanter, non blanching purple 5 x 6 x 0 cm; surrounded by non blanching red 7 x 8 x 0 cm. 5.  Sacral, Pressure Injury Stage 4 Present on Admission: Wound bed mix of 60% red 30% tan 10% brown with exposed bone;   Black rim along perimeter from 11-2 and 5-8.  Periwound hyperpigmented non blanching purple; tender to touch; small serousanguinous exudate. Heels offloaded on prevalon boots.      Recommendations:    Prevalon boots  Upgrade bed  Turn team  Sacrum:  Daily cleanse with Dakins, apply Santyl and saline moist gauze; cover with dry and transparent dressings. Venelex to right and left trochanters twice daily; Protect left trochanter with mepilex border.   Right knee and right shoulder:  Daily apply xerofom and cover with mepilex border.      Skin Care & Pressure Prevention:  Minimize layers of linen/pads under patient to optimize support surface.    Turn/reposition approximately every 2 hours and offload heels. Manage incontinence / promote continence.   Specialty bed:  Sport or P500      Discussed with Aaliyah Diallo RN      Transition of Care: Plan to follow as needed while admitted to hospital.  ZOE Murray RN  Office [phone]  Pager [phone]

## 2018-01-03 NOTE — PROGRESS NOTES
Hospitalist Progress Note  Leny Alejandra MD  Answering service: [phone] from in house phone  Cell: [phone]      Date of Service:  1/3/2018  NAME:  Bridgette Moreira  :  1940  MRN:  [id number]      Admission Summary: This is a 59-year-old -American male with a past medical history of hypercholesterolemia, coronary artery disease status post CABG, Parkinson disease, prostate cancer status post prostatectomy, who comes over here after being sent by the home health nurse for abnormal labs. Patient was admitted for weight loss and failure to thrive. Interval history / Subjective:       F/u for failure to thrive. No acute overnight events. He is about the same. Minimally verbally responsive. No new complaints. S/p PEG tube placement today. Assessment & Plan:     Failure to thrive with Dysphagia  Severe protein Caloric restriction  He pulled out NG tube 18  S/p PEG tube insertion. Nutrition for tube feed orders    UTI: Completed antibiotic course    Sacral decubitus Ulcer: Un stageable on admission  Continue wound care    Metabolic encephalopathy: POA, sec to hypernatremia. 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 Patient is rate controlled. On Aspirin    Parkinson's disease with functional quadriplegia:  Continue every 2 hour turning.   Continue Sinemet     Anemia: Likely due to nutritional deficiencies  Start empiric ferrous sulfate  H/h stable    Diet : Keep NPO  Gentle NS IVF hydration  Start tube feeding tonight     Code status: Full  DVT prophylaxis:     Care Plan discussed with: Patient/Family and Nurse  Disposition: TBD, likely discharge tomorrow to [facility] Problems  Date Reviewed: 12/15/2017          Codes Class Noted POA    Metabolic encephalopathy JFX-09-CW: G93.41  ICD-9-CM: 348.31 12/21/2017 Unknown        * (Principal)Weakness ICD-10-CM: R53.1  ICD-9-CM: 780.79  12/15/2017 Yes        Parkinson disease ([facility] [address] (Chronic) ICD-10-CM: G20  ICD-9-CM: 332.0  11/7/2016 Yes                Review of Systems:   Pertinent items are noted in HPI. Vital Signs:    Last 24hrs VS reviewed since prior progress note. Most recent are:  Visit Vitals    /49    Pulse 81    Temp 98.1 °F (36.7 °C)    Resp 20    Ht 6' 1\" (1.854 m)    Wt 57.9 kg (127 lb 9.6 oz)    SpO2 100%    BMI 16.83 kg/m2         Intake/Output Summary (Last 24 hours) at 01/03/18 1817  Last data filed at 01/03/18 1501   Gross per 24 hour   Intake              400 ml   Output             1000 ml   Net             -600 ml        Physical Examination:             Constitutional:  No acute distress, cooperative, cachectic and chronically ill looking   ENT:  Oral mucous dry, oropharynx benign. Neck supple,    Resp:  CTA bilaterally. No wheezing/rhonchi/rales. No accessory muscle use   CV:  Regular rhythm, normal rate, no murmurs, gallops, rubs    GI:  Soft, non distended, non tender. normoactive bowel sounds, no hepatosplenomegaly. Abdominal binder     Musculoskeletal:  +2 edema, warm, 2+ pulses throughout    Neurologic:  Moves all extremities. AAOx3, CN II-XII reviewed    Skin: +ve sacral decubitus ulcer              Ext: Bila upper and lower extremity contractures. Both feet on booths. Data Review:          Labs:     No results for input(s): WBC, HGB, HCT, PLT, HGBEXT, HCTEXT, PLTEXT, HGBEXT, HCTEXT, PLTEXT in the last 72 hours. No results for input(s): NA, K, CL, CO2, BUN, CREA, GLU, CA, MG, PHOS, URICA in the last 72 hours. No results for input(s): SGOT, GPT, ALT, AP, TBIL, TBILI, TP, ALB, GLOB, GGT, AML, LPSE in the last 72 hours. No lab exists for component: AMYP, HLPSE  No results for input(s): INR, PTP, APTT in the last 72 hours.     No lab exists for component: INREXT, INREXT   No results for input(s): FE, TIBC, PSAT, FERR in the last 72 hours. No results found for: FOL, RBCF   No results for input(s): PH, PCO2, PO2 in the last 72 hours. No results for input(s): CPK, CKNDX, TROIQ in the last 72 hours.     No lab exists for component: CPKMB  Lab Results   Component Value Date/Time    Cholesterol, total 106 12/16/2017 05:15 AM    HDL Cholesterol 26 12/16/2017 05:15 AM    LDL, calculated 58.4 12/16/2017 05:15 AM    Triglyceride 108 12/16/2017 05:15 AM    CHOL/HDL Ratio 4.1 12/16/2017 05:15 AM     Lab Results   Component Value Date/Time    Glucose (POC) 107 12/20/2017 11:58 AM    Glucose (POC) 91 11/27/2009 12:20 PM     Lab Results   Component Value Date/Time    Color YELLOW/STRAW 12/21/2017 12:00 AM    Appearance CLOUDY 12/21/2017 12:00 AM    Specific gravity 1.009 12/21/2017 12:00 AM    pH (UA) 6.0 12/21/2017 12:00 AM    Protein NEGATIVE  12/21/2017 12:00 AM    Glucose NEGATIVE  12/21/2017 12:00 AM    Ketone NEGATIVE  12/21/2017 12:00 AM    Bilirubin NEGATIVE  12/21/2017 12:00 AM    Urobilinogen 1.0 12/21/2017 12:00 AM    Nitrites NEGATIVE  12/21/2017 12:00 AM    Leukocyte Esterase MODERATE 12/21/2017 12:00 AM    Epithelial cells FEW 12/21/2017 12:00 AM    Bacteria 4+ 12/21/2017 12:00 AM    WBC 10-20 12/21/2017 12:00 AM    RBC 10-20 12/21/2017 12:00 AM         Medications Reviewed:     Current Facility-Administered Medications   Medication Dose Route Frequency    sodium chloride (NS) flush 5-10 mL  5-10 mL IntraVENous Q8H    0.9% sodium chloride infusion  75 mL/hr IntraVENous CONTINUOUS    collagenase (SANTYL) 250 unit/gram ointment   Topical DAILY    glycopyrrolate (ROBINUL) injection 0.2 mg  0.2 mg IntraVENous Q4H PRN    levoFLOXacin (LEVAQUIN) 750 mg in D5W IVPB  750 mg IntraVENous Q24H    balsam peru-castor oil (VENELEX)  mg/gram ointment   Topical BID    sodium hypochlorite (QUARTER STRENGTH DAKIN'S) 0.125% irrigation (bottle)   Topical TID    carbidopa-levodopa (SINEMET)  mg per tablet 1.5 Tab  1.5 Tab Oral QID    pantoprazole (PROTONIX) 2 mg/mL oral suspension 20 mg  20 mg Oral ACB    lidocaine (XYLOCAINE) 2 % jelly   Mucous Membrane PRN    morphine injection 2 mg  2 mg IntraVENous Q4H PRN    aspirin chewable tablet 81 mg  81 mg Oral QHS    polyethylene glycol (MIRALAX) packet 17 g  17 g Oral QHS    latanoprost (XALATAN) 0.005 % ophthalmic solution 1 Drop  1 Drop Both Eyes QHS    sodium chloride (NS) flush 5-10 mL  5-10 mL IntraVENous Q8H    sodium chloride (NS) flush 5-10 mL  5-10 mL IntraVENous PRN    acetaminophen (TYLENOL) tablet 650 mg  650 mg Oral Q4H PRN    Or    acetaminophen (TYLENOL) solution 650 mg  650 mg Per NG tube Q4H PRN    Or    acetaminophen (TYLENOL) suppository 650 mg  650 mg Rectal Q4H PRN    rivastigmine (EXELON) 9.5 mg/24 hr patch 1 Patch  1 Patch TransDERmal DAILY     ______________________________________________________________________  EXPECTED LENGTH OF STAY: 6d 7h  ACTUAL LENGTH OF STAY:          [address] Cindy Clay MD

## 2018-01-03 NOTE — PROGRESS NOTES
[address]  174 [facility], [address]       GI PROGRESS NOTE  Will Edel Kumar  [phone] office  [phone] NP/PA in-hospital cell phone M-F until 4:30PM  After 5PM or on weekends, please call  for physician on call      NAME: Jerome Bradley   :  1940   MRN:  [id number]       Subjective:   Patient appears to be resting comfortably. There is no family at the bedside. Objective:     VITALS:   Last 24hrs VS reviewed since prior progress note. Most recent are:  Visit Vitals    /64    Pulse 64    Temp 98 °F (36.7 °C)    Resp 19    Ht 6' 1\" (1.854 m)    Wt 57.9 kg (127 lb 9.6 oz)    SpO2 99%    BMI 16.83 kg/m2       PHYSICAL EXAM:  General: No acute distress  Neurologic:  Drowsy, nonverbal  HEENT: Intermittent eye opening   Lungs:  CTA bilaterally anteriorly  Heart:  S1 S2, regular rate and rhythm  Abdomen: Soft, non-distended, no tenderness, no guarding, no rebound. +Bowel sounds  Extremities: Warm  Psych:   Unable to assess    Lab Data Reviewed:     No results found for this or any previous visit (from the past 24 hour(s)). Assessment:   · Moderate/severe oropharyngeal dysphagia: speech language pathology re-evaluated and recommends ?hospice with comfort feeds vs. PEG tube. Speech language pathology signed off. Family would like to proceed with PEG tube placement. No anticoagulation. · Failure to thrive: severe protein calorie malnutrition; Dobhoff tube pulled out. Patient Active Problem List   Diagnosis Code    Chest pain R07.9    Parkinson disease ([facility] [address] G20    Weakness J62.5    Metabolic encephalopathy V53.41     Plan:   · Plan for PEG tube placement today at 2:00PM with Dr. Ashley Jaquez. I discussed with the patient's wife yesterday afternoon on the phone. She understands the risks of the procedure and is in agreement to proceed. Discussed with nurse who has already obtained consent.       Signed By: Mary Bailey     1/3/2018 11:36 AM         I have examined the patient. I have reviewed the chart and agree with the documentation recorded by the NP, including the assessment, treatment plan, and disposition.     Discussed risks and benefits of EGD and PEG tube placement with pt's wife, she was agreeable    Pablo Enciso MD

## 2018-01-04 VITALS
WEIGHT: 130.4 LBS | HEIGHT: 73 IN | BODY MASS INDEX: 17.28 KG/M2 | TEMPERATURE: 97.7 F | SYSTOLIC BLOOD PRESSURE: 123 MMHG | RESPIRATION RATE: 18 BRPM | HEART RATE: 60 BPM | OXYGEN SATURATION: 96 % | DIASTOLIC BLOOD PRESSURE: 62 MMHG

## 2018-01-04 LAB
ANION GAP SERPL CALC-SCNC: 7 MMOL/L (ref 5–15)
BASOPHILS # BLD: 0 K/UL (ref 0–0.1)
BASOPHILS NFR BLD: 0 % (ref 0–1)
BUN SERPL-MCNC: 13 MG/DL (ref 6–20)
BUN/CREAT SERPL: 22 (ref 12–20)
CALCIUM SERPL-MCNC: 7.5 MG/DL (ref 8.5–10.1)
CHLORIDE SERPL-SCNC: 112 MMOL/L (ref 97–108)
CO2 SERPL-SCNC: 27 MMOL/L (ref 21–32)
CREAT SERPL-MCNC: 0.58 MG/DL (ref 0.7–1.3)
DIFFERENTIAL METHOD BLD: ABNORMAL
EOSINOPHIL # BLD: 0 K/UL (ref 0–0.4)
EOSINOPHIL NFR BLD: 0 % (ref 0–7)
ERYTHROCYTE [DISTWIDTH] IN BLOOD BY AUTOMATED COUNT: 15.9 % (ref 11.5–14.5)
GLUCOSE SERPL-MCNC: 91 MG/DL (ref 65–100)
HCT VFR BLD AUTO: 24.8 % (ref 36.6–50.3)
HGB BLD-MCNC: 8.1 G/DL (ref 12.1–17)
LYMPHOCYTES # BLD: 0.4 K/UL (ref 0.8–3.5)
LYMPHOCYTES NFR BLD: 4 % (ref 12–49)
MAGNESIUM SERPL-MCNC: 1.8 MG/DL (ref 1.6–2.4)
MCH RBC QN AUTO: 30.7 PG (ref 26–34)
MCHC RBC AUTO-ENTMCNC: 32.7 G/DL (ref 30–36.5)
MCV RBC AUTO: 93.9 FL (ref 80–99)
MONOCYTES # BLD: 0.5 K/UL (ref 0–1)
MONOCYTES NFR BLD: 5 % (ref 5–13)
NEUTS SEG # BLD: 8.6 K/UL (ref 1.8–8)
NEUTS SEG NFR BLD: 91 % (ref 32–75)
PLATELET # BLD AUTO: 508 K/UL (ref 150–400)
PLATELET COMMENTS,PCOM: ABNORMAL
POTASSIUM SERPL-SCNC: 3.8 MMOL/L (ref 3.5–5.1)
RBC # BLD AUTO: 2.64 M/UL (ref 4.1–5.7)
RBC MORPH BLD: ABNORMAL
RBC MORPH BLD: ABNORMAL
SODIUM SERPL-SCNC: 146 MMOL/L (ref 136–145)
WBC # BLD AUTO: 9.5 K/UL (ref 4.1–11.1)

## 2018-01-04 PROCEDURE — 83735 ASSAY OF MAGNESIUM: CPT | Performed by: HOSPITALIST

## 2018-01-04 PROCEDURE — 74011250636 HC RX REV CODE- 250/636: Performed by: HOSPITALIST

## 2018-01-04 PROCEDURE — 74011250637 HC RX REV CODE- 250/637: Performed by: HOSPITALIST

## 2018-01-04 PROCEDURE — 80048 BASIC METABOLIC PNL TOTAL CA: CPT | Performed by: HOSPITALIST

## 2018-01-04 PROCEDURE — 85025 COMPLETE CBC W/AUTO DIFF WBC: CPT | Performed by: HOSPITALIST

## 2018-01-04 PROCEDURE — 36415 COLL VENOUS BLD VENIPUNCTURE: CPT | Performed by: HOSPITALIST

## 2018-01-04 PROCEDURE — 74011250637 HC RX REV CODE- 250/637: Performed by: PSYCHIATRY & NEUROLOGY

## 2018-01-04 RX ORDER — DEXTROSE MONOHYDRATE 50 MG/ML
50 INJECTION, SOLUTION INTRAVENOUS CONTINUOUS
Status: DISCONTINUED | OUTPATIENT
Start: 2018-01-04 | End: 2018-01-04 | Stop reason: HOSPADM

## 2018-01-04 RX ORDER — CARBIDOPA AND LEVODOPA 25; 100 MG/1; MG/1
1.5 TABLET ORAL 4 TIMES DAILY
Qty: 30 TAB | Refills: 2 | Status: SHIPPED | OUTPATIENT
Start: 2018-01-04

## 2018-01-04 RX ADMIN — CARBIDOPA AND LEVODOPA 1.5 TABLET: 25; 100 TABLET ORAL at 06:55

## 2018-01-04 RX ADMIN — COLLAGENASE SANTYL: 250 OINTMENT TOPICAL at 08:58

## 2018-01-04 RX ADMIN — CASTOR OIL AND BALSAM, PERU: 788; 87 OINTMENT TOPICAL at 08:58

## 2018-01-04 RX ADMIN — PANTOPRAZOLE SODIUM 20 MG: 40 TABLET, DELAYED RELEASE ORAL at 07:00

## 2018-01-04 RX ADMIN — DAKIN'S SOLUTION 0.125% (QUARTER STRENGTH): 0.12 SOLUTION at 06:57

## 2018-01-04 RX ADMIN — DEXTROSE MONOHYDRATE 50 ML/HR: 5 INJECTION, SOLUTION INTRAVENOUS at 09:23

## 2018-01-04 RX ADMIN — Medication 10 ML: at 06:57

## 2018-01-04 NOTE — DISCHARGE SUMMARY
Discharge Summary       PATIENT ID: Harlan Peck  MRN: [id number]   YOB: 1940    DATE OF ADMISSION: 12/15/2017  2:36 PM    DATE OF DISCHARGE: 1/4/2018  PRIMARY CARE PROVIDER: Jose R Stout MD       DISCHARGING PHYSICIAN: Arti Briones MD    To contact this individual call [phone] and ask the  to page. If unavailable ask to be transferred the Adult Hospitalist Department. CONSULTATIONS: IP CONSULT TO HOSPITALIST  IP CONSULT TO NEUROLOGY  IP CONSULT TO NEPHROLOGY  IP CONSULT TO PALLIATIVE CARE - PROVIDER  IP CONSULT TO NEUROLOGY  IP CONSULT TO GASTROENTEROLOGY  IP CONSULT TO PULMONOLOGY  IP CONSULT TO CARDIOLOGY  IP CONSULT TO GENERAL SURGERY    PROCEDURES/SURGERIES: Procedure(s) with comments:  PERCUTANEOUS ENDOSCOPIC GASTROSTOMY TUBE INSERTION - peg  ESOPHAGOGASTRODUODENOSCOPY (EGD)    ADMITTING DIAGNOSES & HOSPITAL COURSE:       This is a 66-year-old -American male with a past medical history of hypercholesterolemia, coronary artery disease status post CABG, Parkinson disease, prostate cancer status post prostatectomy, who comes over here after being sent by the home health nurse for abnormal labs.      Patient was admitted for weight loss and failure to thrive. He consistently failed swallow evaluation. An NG tube was placed and he had tube feedings but NG tube was pulled out by patient despite being on restraints. Wife later consented to PEG tube placement after an initial delay. PEG was placed on 1/3/2017. Patient is hemodynamically stable and will be discharged to [facility]. Please see below for the rest of the plan. DISCHARGE DIAGNOSES / PLAN:      Failure to thrive with Dysphagia  Severe protein Caloric restriction  He pulled out NG tube 1/2/18  S/p PEG tube insertion 1/3/18  Continue Tube feeding via PEG.      UTI: Completed antibiotic course     Sacral decubitus Ulcer: Unstageable on admission  Continue wound care     Metabolic encephalopathy: POA, sec to hypernatremia. Patient appears to be at baseline  MRI shows old pontine infarct  Stable. Patient at baseline is minimally verbally responsive.     Hypernatremia: Continue free water flushes   At present, receiving at least 1,900 mls of free water / day. Serum sodium prior to discharge in 146. Repeat serum sodium tomorrow 1/9/18       Paroxysmal atrial Fibrillation:   Echo EF 60%. Patient is rate controlled. On Aspirin     Parkinson's disease with functional quadriplegia:  Continue every 2 hour turning. Continue Sinemet      Anemia: Likely due to nutritional deficiencies  Continue ferrous sulfate  Monitor H/H     Left lower lobe mass; Likely neoplastic  CT chest shows left lower lobe mass like lesion 16 x 27 mm in size  Please see CT report. Follow up with repeat CT in 6 months       Disposition prior to discharge: hemodynamically stable and has improved. SNF          PENDING TEST RESULTS:   At the time of discharge the following test results are still pending:     FOLLOW UP APPOINTMENTS:    Follow-up Information     None           ADDITIONAL CARE RECOMMENDATIONS: Repeat BMP 1/9/18 or 1/12/18    DIET: PEG feeding at 20 ml/hr and increase by 10 mls every 4 hrs for goal of 55 ml's /hr with free water flushes of 150 mls every 4 hours    ACTIVITY: bed ridden. Turn every 2 hrs    WOUND CARE: Right knee and Left knee:  Daily cleanse with saline, apply xerofom and cover with mepilex border. Sacrum:  Daily cleanse with Dakins, apply Santyl and saline moist gauze; cover with dry dressing and transparent dressing. EQUIPMENT needed:       DISCHARGE MEDICATIONS:  Current Discharge Medication List            NOTIFY YOUR PHYSICIAN FOR ANY OF THE FOLLOWING:   Fever over 101 degrees for 24 hours. Chest pain, shortness of breath, fever, chills, nausea, vomiting, diarrhea, change in mentation, falling, weakness, bleeding. Severe pain or pain not relieved by medications.   Or, any other signs or symptoms that you may have questions about.     DISPOSITION:    Home With:   OT  PT  HH  RN      x Long term SNF/Inpatient Rehab    Independent/assisted living    Hospice    Other:       PATIENT CONDITION AT DISCHARGE:     Functional status   x Poor     Deconditioned     Independent      Cognition     Lucid     Forgetful    x Dementia      Catheters/lines (plus indication)    Matias     PICC    x PEG     None      Code status    x Full code     DNR      PHYSICAL EXAMINATION AT DISCHARGE:   Refer to Progress Note      CHRONIC MEDICAL DIAGNOSES:  Problem List as of 1/4/2018  Date Reviewed: 12/15/2017          Codes Class Noted - Resolved    Metabolic encephalopathy [facility]-09-SK: G93.41  ICD-9-CM: 348.31  12/21/2017 - Present        * (Principal)Weakness ICD-10-CM: R53.1  ICD-9-CM: 780.79  12/15/2017 - Present        Chest pain ICD-10-CM: R07.9  ICD-9-CM: 786.50  11/7/2016 - Present        Parkinson disease ([facility] [address] (Chronic) ICD-10-CM: G20  ICD-9-CM: 332.0  11/7/2016 - Present              Greater than 30 minutes were spent with the patient on counseling and coordination of care    Signed:   Nacho Pugh MD  1/4/2018  11:14 AM

## 2018-01-04 NOTE — PROGRESS NOTES
Patient discharge for today. Ambulance transport for 12 noon today due to inclement weather. CM spoke with patient's wife and she is in agreement with plans and has spoken with [facility] [place] facility liaison.   Jake Romero, PABLO, CRM

## 2018-01-04 NOTE — PROGRESS NOTES
[address]  174 [facility], [address]       GI PROGRESS NOTE  Will Zbigniew Bustamante  [phone] office  [phone] NP/PA in-hospital cell phone M-F until 4:30PM  After 5PM or on weekends, please call  for physician on call      NAME: Rena Markham   :  1940   MRN:  [id number]       Subjective:   Patient appears to be resting comfortably. PEG tube placed yesterday and he is receiving tube feeds. There is no family at the bedside. Objective:     VITALS:   Last 24hrs VS reviewed since prior progress note. Most recent are:  Visit Vitals    /62 (BP 1 Location: Right arm, BP Patient Position: At rest)    Pulse 60    Temp 97.7 °F (36.5 °C)    Resp 18    Ht 6' 1\" (1.854 m)    Wt 59.1 kg (130 lb 6.4 oz)    SpO2 96%    BMI 17.2 kg/m2       PHYSICAL EXAM:  General: No acute distress  Neurologic:  Asleep, nonverbal  HEENT: Intermittent eye movements  Lungs:  CTA bilaterally anteriorly  Heart:  S1 S2, regular rate and rhythm   Abdomen: Soft, non-distended, no tenderness. +Bowel sounds. PEG tube in place with no signs of bleeding. No warmth or erythema. Easy rotation. Receiving tube feeds. Extremities: Warm  Psych:   Unable to assess    Lab Data Reviewed:     Recent Results (from the past 24 hour(s))   CBC WITH AUTOMATED DIFF    Collection Time: 18  1:24 AM   Result Value Ref Range    WBC 9.5 4.1 - 11.1 K/uL    RBC 2.64 (L) 4.10 - 5.70 M/uL    HGB 8.1 (L) 12.1 - 17.0 g/dL    HCT 24.8 (L) 36.6 - 50.3 %    MCV 93.9 80.0 - 99.0 FL    MCH 30.7 26.0 - 34.0 PG    MCHC 32.7 30.0 - 36.5 g/dL    RDW 15.9 (H) 11.5 - 14.5 %    PLATELET 135 (H) 431 - 400 K/uL    NEUTROPHILS 91 (H) 32 - 75 %    LYMPHOCYTES 4 (L) 12 - 49 %    MONOCYTES 5 5 - 13 %    EOSINOPHILS 0 0 - 7 %    BASOPHILS 0 0 - 1 %    ABS. NEUTROPHILS 8.6 (H) 1.8 - 8.0 K/UL    ABS. LYMPHOCYTES 0.4 (L) 0.8 - 3.5 K/UL    ABS. MONOCYTES 0.5 0.0 - 1.0 K/UL    ABS. EOSINOPHILS 0.0 0.0 - 0.4 K/UL    ABS. BASOPHILS 0.0 0.0 - 0.1 K/UL    DF SMEAR SCANNED      PLATELET COMMENTS LARGE PLATELETS      RBC COMMENTS ANISOCYTOSIS  1+        RBC COMMENTS OVALOCYTES  PRESENT       MAGNESIUM    Collection Time: 01/04/18  1:24 AM   Result Value Ref Range    Magnesium 1.8 1.6 - 2.4 mg/dL   METABOLIC PANEL, BASIC    Collection Time: 01/04/18  1:24 AM   Result Value Ref Range    Sodium 146 (H) 136 - 145 mmol/L    Potassium 3.8 3.5 - 5.1 mmol/L    Chloride 112 (H) 97 - 108 mmol/L    CO2 27 21 - 32 mmol/L    Anion gap 7 5 - 15 mmol/L    Glucose 91 65 - 100 mg/dL    BUN 13 6 - 20 MG/DL    Creatinine 0.58 (L) 0.70 - 1.30 MG/DL    BUN/Creatinine ratio 22 (H) 12 - 20      GFR est AA >60 >60 ml/min/1.73m2    GFR est non-AA >60 >60 ml/min/1.73m2    Calcium 7.5 (L) 8.5 - 10.1 MG/DL     Assessment:   · Moderate/severe oropharyngeal dysphagia: status post PEG tube placement (1/3/18). Receiving tube feeds. · Failure to thrive: severe protein calorie malnutrition. Patient Active Problem List   Diagnosis Code    Chest pain R07.9    Parkinson disease ([facility] [address] G20    Weakness P79.8    Metabolic encephalopathy V72.36     Plan:   · Continue tube feeds  · We will sign off and be available again as needed. Thank you.      Signed By: JOSE Le     1/4/2018  9:41 AM

## 2018-01-04 NOTE — PROGRESS NOTES
Called report to Aryan Mccauley at THE [facility] [place]. Pts IV and condom cath have been removed and is awaiting transport at 12. Gave my name and phone number incase she needed clarification later.

## 2018-01-04 NOTE — PROGRESS NOTES
Bedside shift change report given to Carissa Cortez RN (oncoming nurse) by Rosy Frias (offgoing nurse). Report included the following information SBAR and Kardex.

## 2018-01-04 NOTE — PROGRESS NOTES
NUTRITION       Consult received for tube feeding management. Pt is s/p PEG placement yesterday. Plan is for discharge to [facility] today. Current rate is 35 mL/hr. No issues with tolerance or PEG overnight reported on night shift, so would resume goal tube feedings once pt arrives at the facility. Pt would benefit from abdominal binder. Goal Tube Feeding: Jevity 1.5 @ 55 mL/hr + 150 mL flush q4 hours  -- this provides 1980 kcal, 84 g protein and 1903 mL total free water (tf + flush)-- meeting 100% of estimated needs. Noted elevated sodium-- likely related to deficit while pt was off of feedings for PEG placement and slow progression to goal tube feeding over the past 12+ hours.     Estimated Nutrition Needs:   Kcals/day: 6936 Kcals/day (1848--2006kcal)  Protein: 74 g (74-84g (1.4-1.6g/kg))  Fluid: 1900 ml (1ml/kcal)     Based On: Kcal/kg - specify (Comment) (35-38kcal/kg)  Weight Used: Actual wt (52.8kg)    RD to follow    Rikki Renteria, [address]

## 2018-01-04 NOTE — PROGRESS NOTES
Bedside shift change report given to [address] [place] [address] (oncoming nurse) by Abby Motta (offgoing nurse). Report included the following information SBAR, Kardex, Procedure Summary, Intake/Output, MAR and Recent Results.

## 2018-01-04 NOTE — DISCHARGE INSTRUCTIONS
Discharge Instructions       PATIENT ID: Stevie Main  MRN: [id number]   YOB: 1940    DATE OF ADMISSION: 12/15/2017  2:36 PM    DATE OF DISCHARGE: 1/4/2018    PRIMARY CARE PROVIDER: Ashanti Bhatti MD       DISCHARGING PHYSICIAN: Brandon Matos MD    To contact this individual call [phone] and ask the  to page. If unavailable ask to be transferred the Adult Hospitalist Department. DISCHARGE DIAGNOSES : failure to Thrive, Advance Parkinson's disease, Bed bound State, hypernatremia, lung mass    CONSULTATIONS: IP CONSULT TO HOSPITALIST  IP CONSULT TO NEUROLOGY  IP CONSULT TO NEPHROLOGY  IP CONSULT TO PALLIATIVE CARE - PROVIDER  IP CONSULT TO NEUROLOGY  IP CONSULT TO GASTROENTEROLOGY  IP CONSULT TO PULMONOLOGY  IP CONSULT TO CARDIOLOGY  IP CONSULT TO GENERAL SURGERY    PROCEDURES/SURGERIES: Procedure(s) with comments:  PERCUTANEOUS ENDOSCOPIC GASTROSTOMY TUBE INSERTION - peg  ESOPHAGOGASTRODUODENOSCOPY (EGD)    PENDING TEST RESULTS:   At the time of discharge the following test results are still pending:     FOLLOW UP APPOINTMENTS:   Follow-up Information     Follow up With Details Comments [address] [place] [address] [place], [place]   [address] [place] [address]  [phone]             ADDITIONAL CARE RECOMMENDATIONS: please see Discharge summary    DIET: Jevity     ACTIVITY: Activity as tolerated, Turn every 2 hours    WOUND CARE: As per surgery    EQUIPMENT needed:       DISCHARGE MEDICATIONS:   See Medication Reconciliation Form    · It is important that you take the medication exactly as they are prescribed. · Keep your medication in the bottles provided by the pharmacist and keep a list of the medication names, dosages, and times to be taken in your wallet. · Do not take other medications without consulting your doctor. NOTIFY YOUR PHYSICIAN FOR ANY OF THE FOLLOWING:   Fever over 101 degrees for 24 hours.    Chest pain, shortness of breath, fever, chills, nausea, vomiting, diarrhea, change in mentation, falling, weakness, bleeding. Severe pain or pain not relieved by medications. Or, any other signs or symptoms that you may have questions about. DISPOSITION:    Home With:   OT  PT  HH  RN      x SNF/Inpatient Rehab/LTAC    Independent/assisted living    Hospice    Other:     CDMP Checked:    Yes X       Signed:   Liz Rodriguez MD  1/4/2018  11:46 AM

## 2018-01-04 NOTE — ANESTHESIA POSTPROCEDURE EVALUATION
Post-Anesthesia Evaluation and Assessment    Patient: Zac Orosco MRN: [id number]  SSN: [id number]    YOB: 1940  Age: 68 y.o. Sex: male       Cardiovascular Function/Vital Signs  Visit Vitals    /62 (BP 1 Location: Right arm, BP Patient Position: At rest)    Pulse 60    Temp 36.5 °C (97.7 °F)    Resp 18    Ht 6' 1\" (1.854 m)    Wt 59.1 kg (130 lb 6.4 oz)    SpO2 96%    BMI 17.2 kg/m2       Patient is status post MAC anesthesia for Procedure(s):  PERCUTANEOUS ENDOSCOPIC GASTROSTOMY TUBE INSERTION  ESOPHAGOGASTRODUODENOSCOPY (EGD). Nausea/Vomiting: None    Postoperative hydration reviewed and adequate. Pain:  Pain Scale 1: Adult Nonverbal Pain Scale (01/04/18 0817)  Pain Intensity 1: 0 (01/04/18 0817)   Managed    Neurological Status:   Neuro (WDL): Exceptions to WDL (12/22/17 1330)  Neuro  Neurologic State: Eyes open to stimulus (01/03/18 2000)  Orientation Level: Unable to verbalize (01/03/18 2000)  Cognition: No command following (01/03/18 2000)  Speech: Incomprehensible words (01/03/18 2000)  Assessment L Pupil: Brisk (01/01/18 0800)  Size L Pupil (mm): 2 (12/26/17 0800)  Assessment R Pupil: Brisk (01/01/18 0800)  Size R Pupil (mm): 2 (12/26/17 0800)  LUE Motor Response: Rigid;Weak (01/03/18 2000)  LLE Motor Response: Rigid;Weak (01/03/18 2000)  RUE Motor Response: Rigid;Weak (01/03/18 2000)  RLE Motor Response: Rigid;Weak (01/03/18 2000)   At baseline    Mental Status and Level of Consciousness: Arousable    Pulmonary Status:   O2 Device: Room air (01/03/18 1713)   Adequate oxygenation and airway patent    Complications related to anesthesia: None    Post-anesthesia assessment completed.  No concerns    Signed By: Jaylen White MD     January 4, 2018

## 2019-06-07 NOTE — PROGRESS NOTES
SBAR received from Neville Alexandra and JONY Tuckers. Consulted with Dr. Arlen Sierra. Reviewed chart. Patient has been declining at home, where wife is caregiver. [facility] followed. Now with tube feeding. Awaiting Palliative meeting with wife to confirm treatment/discharge plan. CRM following for completion of d/c plan. Will need DME and tube feed supplies if that is the plan. Alina Perez LCSW, CCM  Consulted with [facility]; they cannot serve 'care patients for tube feedings.   Alina Perez LCSW, CCM EMS

## 2021-04-06 NOTE — PROGRESS NOTES
PRN medication administered for pain in L leg 8/10. BP elevated at this time. Will recheck after pain medication takes effect. Problem: Falls - Risk of  Goal: *Absence of Falls  Document Ora Fall Risk and appropriate interventions in the flowsheet.    Outcome: Progressing Towards Goal  Fall Risk Interventions:  Mobility Interventions: Communicate number of staff needed for ambulation/transfer, Mechanical lift, PT Consult for mobility concerns, PT Consult for assist device competence    Mentation Interventions: Adequate sleep, hydration, pain control, Door open when patient unattended, More frequent rounding    Medication Interventions: Assess postural VS orthostatic hypotension    Elimination Interventions: Call light in reach, Toileting schedule/hourly rounds

## 2021-05-03 NOTE — PROGRESS NOTES
The documentation for this period is being entered following the guidelines as defined in the [facility] policy by Je Everett RN. enlarged right axillary lymph node

## 2023-06-03 NOTE — PROGRESS NOTES
Problem: Dysphagia (Adult)  Goal: *Acute Goals and Plan of Care (Insert Text)  Speech therapy goals  Initiated 12/18/2017   1. Patient will participate in re-assessment of swallow function within 7 days   Speech LAnguage Pathology bedside swallow evaluation  Patient: Harlan Peck (4 y.o. male)  Date: 12/18/2017  Primary Diagnosis: Weakness  Weakness        Precautions: aspiration, fall        ASSESSMENT :  Based on the objective data described below, the patient presents with severe oral and mod/severe pharyngeal dysphagia. He was fully alert and cries with repositioning. Makes attempts at speech, however, unintelligible mumbling. No command following. Oral dysphagia was characterized by absent bolus acceptance with no attempt to extract ice from spoon or suck on straw despite max cues. When ice chip was placed in oral cavity, he demonstrated minimal/no manipulation with passive posterior propulsion. Delayed swallow initiation and reduced hyolaryngeal elevation/excursion via palpation. Weak cough after the swallow concerning for aspiration. High risk for aspiration with all PO at this time and mental status further increases risks. Patient will benefit from skilled intervention to address the above impairments. Patients rehabilitation potential is considered to be Guarded  Factors which may influence rehabilitation potential include:   []            None noted  [x]            Mental ability/status  [x]            Medical condition  []            Home/family situation and support systems  [x]            Safety awareness  []            Pain tolerance/management  []            Other:      PLAN :  Recommendations and Planned Interventions:  --recommend continued NPO with discussions regarding plan of care as it appears patient has been worsening PTA and documented weight is 116lb with patient over 6ft tall concerning for poor nutritional status for quite some time.   ?Palliative Care consult?  --SLP to follow for re-assessment of swallow function as appropriate  Frequency/Duration: Patient will be followed by speech-language pathology 3 times a week to address goals. Discharge Recommendations: To Be Determined     SUBJECTIVE:   Patient alert, cooperative, unintelligble mumblings    OBJECTIVE:     Past Medical History:   Diagnosis Date    CAD (coronary artery disease)     Hypercholesteremia     Osteoporosis     Parkinson disease ([facility] [address]      Past Surgical History:   Procedure Laterality Date    CARDIAC SURG PROCEDURE UNLIST      bipass x 2    HX PROSTATECTOMY       Prior Level of Function/Home Situation:   Home Situation  Home Environment: Private residence  # Steps to Enter: 4  One/Two Story Residence: Two story  Living Alone: No  Support Systems: Child(bear), Family member(s), Friends \ neighbors, Home care staff, Spouse/Significant Other/Partner  Patient Expects to be Discharged to[de-identified] Private residence  Current DME Used/Available at Home: [address], rollator, Wheelchair  Diet prior to admission: ?regular per chart review? Current Diet:  NPO    Cognitive and Communication Status:  Neurologic State: Alert, Confused  Orientation Level: Unable to verbalize  Cognition: No command following, Decreased attention/concentration        Safety/Judgement: Not assessed  Oral Assessment:  Oral Assessment  Labial: Other (comment) (did not follow commands to assess )  Oral Hygiene: dry mucosa; oral care provided with out response to swab in mouth   P.O. Trials:  Patient Position: upright in bed   Vocal quality prior to P.O.:  (unintelligible mumbling )  Consistency Presented: Ice chips; Thin liquid  How Presented: SLP-fed/presented;Spoon;Straw     Bolus Acceptance: Absent  Bolus Formation/Control: Impaired  Type of Impairment: Other (comment) (no active manipulation of ice chip placed in mouth)  Propulsion: Other (comment) (passive)  Oral Residue: None  Initiation of Swallow: Delayed (# of seconds)  Laryngeal Elevation: Decreased;Weak  Aspiration Signs/Symptoms: Weak cough  Pharyngeal Phase Characteristics: Audible swallow  Effective Modifications: None  Cues for Modifications: Maximal       Oral Phase Severity: Severe  Pharyngeal Phase Severity : Moderate-severe      G Codes: In compliance with CMSs Claims Based Outcome Reporting, the following G-code set was chosen for this patient based the use of the NOMS functional outcome to quantify this patient's level of swallowing impairment. Using the NOMS, the patient was determined to be at level 1 for swallow function which correlates with the CN= 100% level of severity. Based on the objective assessment provided within this note, the current, goal, and discharge g-codes are as follows:    Swallow  Swallowing:   Swallow Current Status CN= 100%   Swallow Goal Status CL= 60-79%      NOMS Swallowing Levels:  Level 1 (CN): NPO  Level 2 (CM): NPO but takes consistency in therapy  Level 3 (CL): Takes less than 50% of nutrition p.o. and continues with nonoral feedings; and/or safe with mod cues; and/or max diet restriction  Level 4 (CK): Safe swallow but needs mod cues; and/or mod diet restriction; and/or still requires some nonoral feeding/supplements  Level 5 (CJ): Safe swallow with min diet restriction; and/or needs min cues  Level 6 (CI): Independent with p.o.; rare cues; usually self cues; may need to avoid some foods or needs extra time  Level 7 ([address] [place] [address]): Independent for all p.o.  ALVINO. (2003). National Outcomes Measurement System (NOMS): Adult Speech-Language Pathology User's Guide.        Pain:  Pain Scale 1: Adult Nonverbal Pain Scale  Pain Intensity 1: 0     After treatment:   []            Patient left in no apparent distress sitting up in chair  [x]            Patient left in no apparent distress in bed  [x]            Call bell left within reach  [x]            Nursing notified  []            Caregiver present  []            Bed alarm activated    COMMUNICATION/EDUCATION:   The patients plan of care including recommendations, planned interventions, and recommended diet changes were discussed with: Physical Therapist and Registered Nurse. []            Patient/family have participated as able in goal setting and plan of care. []            Patient/family agree to work toward stated goals and plan of care. []            Patient understands intent and goals of therapy, but is neutral about his/her participation. [x]            Patient is unable to participate in goal setting and plan of care. Thank you for this referral.  Phillip Brennan M.CD.  CCC-SLP   Time Calculation: 11 mins None

## 2023-08-10 NOTE — CONSULTS
[facility] PROFESSIONAL SERVS  [address] [id number]  Dept: [phone]  Dept Fax: [phone]      Patient Name:  Vipul Rios  Visit Date:  8/10/2023    HPI:     Mr. Izaiah Myers is a 64 y.o. male that presents today at [facility] for evaluation of the following: Referral to our service for evaluation of symptoms consistent with lumbar compression fracture    Chief Complaint   Patient presents with    Consultation     Compression fracture of L1        HPI   Mr. Izaiah Myers is a pleasant, active 51-year-old, overweight, gentleman, a never smoker tobacco and a nonuser of smokeless tobacco or vaping products who denies current alcohol use and has a medical history significant for Maik and depression, diabetes mellitus, hyperlipidemia and hypertension and seizure disorder with a surgical history significant for multiple pain management procedures along with knee arthroscopy and leg surgery. Is to our service as a referral for evaluation of symptoms consistent with lumbar compression fracture diagnosed 2 years ago but did not involve surgical intervention at that time. In addition to treating pain with injection therapies provided by pain management he also treats with hydrocodone and baclofen and Lyrica. He arrives to today's appointment recent MRI in the form of an MRI of the lumbar spine imaged in July 2021 which shows the, then active fracture, at lumbar 1. Today unaccompanied and ambulating without assistance but was noticeably uncomfortable with complaints of ongoing back and bilateral knee pain. Denies a significant event having precipitated the pain or fracture. He does not relate a trauma but rather a gradual onset and worsening of back pain that began approximately 2 years ago.   He states that the pain is worsened with activities including walking or standing for long periods of time and demonstrates a significantly Palliative Medicine Consult  Gianni: [phone])    Patient Name: Angie Allen  YOB: 1940    Date of Initial Consult: December 19, 2017  Reason for Consult: Care Decisions  Requesting Provider: Dr. Jaz Siegel  Primary Care Physician: Bob Espinoza MD     SUMMARY:   Angie Allen is a 68 y.o. with a past history of CAD, Parkinson's Disease, hypercholesterolemia, osteoporosis, prostate cancer s/p prostatectomy who was admitted on 12/15/2017 from home with a diagnosis of hypernatremia, increasing weakness, unintentional wt loss. Admission complicated by left lower lobe lung mass concerning for neoplasia but CT images reveal more of a rounded pneumonia per pulmonary notes. Dysphagia also noted. Pt scheduled for NG tube placed by IR today. Current medical issues leading to Palliative Medicine involvement include: care goals. Social: , 2 children, 1 grand child,    PALLIATIVE DIAGNOSES:   1. Dysphagia  2. Rigidity   3. Dysphonia   4. Physical debility  5. Parkinson's Disease  6. Pressure Ulcer     PLAN:   1. Met with the patient's wife  2. Explained the role of Palliative Medicine and purpose of the consultation  3. Discussed the patient's condition, feeding tube, code status, pt wishes, care at home, risk of decline, questions, concerns  4. Wife shared that the patient's wish was to attempt resuscitation but not to be prolonged on machines   5. Wife inclined to have peg placed if needed. She is hopeful that he would eventually be able to eat on his own. Wife wants the pt to get as much protein as possible to heal the wound  6. Explained that the feeding tube would likely be permanent and why. Wife verbalized an understanding  7. Wife says the pt has POA documents that she will bring in for copying  8. Goals: continue current level of care, full code status, feeding tube, return home  9. Will follow up tomorrow  10.  Initial consult note routed to primary continuity provider  11. Communicated plan of care with: Palliative IDT       GOALS OF CARE / TREATMENT PREFERENCES:     GOALS OF CARE: Continue current level of care. Full Code status. Return home upon dc. Patient/Health Care Proxy Stated Goals: Prolong life      TREATMENT PREFERENCES:   Code Status: Full Code    Advance Care Planning:  Advance Care Planning 12/20/2017   Patient's Healthcare Decision Maker is: Legal Next of Alex 69   Primary Decision Maker Name Mrs. Dottie Ty Decision Maker Phone Number [phone]   Primary Decision Maker Relationship to Patient Spouse   Confirm Advance Directive None   Patient Would Like to Complete Advance Directive Unable       Medical Interventions: Full interventions   Other Instructions:   Artificially Administered Nutrition: Feeding tube long-term, if indicated     Other:    As far as possible, the palliative care team has discussed with patient / health care proxy about goals of care / treatment preferences for patient.      HISTORY:     History obtained from: chart    CHIEF COMPLAINT: nonverbal    HPI/SUBJECTIVE:    The patient is:   [] Verbal and participatory  [x] Non-participatory due to:   Medical condition    Clinical Pain Assessment (nonverbal scale for severity on nonverbal patients):   Clinical Pain Assessment  Severity: 0     Activity (Movement): Lying quietly, normal position    Duration: for how long has pt been experiencing pain (e.g., 2 days, 1 month, years)  Frequency: how often pain is an issue (e.g., several times per day, once every few days, constant)     FUNCTIONAL ASSESSMENT:     Palliative Performance Scale (PPS):  PPS: 30       PSYCHOSOCIAL/SPIRITUAL SCREENING:     Palliative IDT has assessed this patient for cultural preferences / practices and a referral made as appropriate to needs (Cultural Services, Patient Advocacy, Ethics, etc.)    Advance Care Planning:  Advance Care Planning 12/20/2017   Patient's Healthcare Decision Maker is: Legal Next of Kin Primary Decision Maker Name Mrs. Dustin Zamudio Decision Maker Phone Number [phone]   Primary Decision Maker Relationship to Patient Spouse   Confirm Advance Directive None   Patient Would Like to Complete Advance Directive Unable       Any spiritual / Judaism concerns:  [] Yes /  [x] No    Caregiver Burnout:  [] Yes /  [x] No /  [] No Caregiver Present      Anticipatory grief assessment:   [x] Normal  / [] Maladaptive       ESAS Anxiety: Anxiety: 0    ESAS Depression:          REVIEW OF SYSTEMS:     Positive and pertinent negative findings in ROS are noted above in HPI. The following systems were [x] reviewed objectively / [] unable to be reviewed as noted in HPI  Other findings are noted below. Systems: constitutional, ears/nose/mouth/throat, respiratory, gastrointestinal, genitourinary, musculoskeletal, integumentary, neurologic, psychiatric, endocrine. Positive findings noted below. Modified ESAS Completed by: provider   Fatigue: 10 Drowsiness: 10     Pain: 0   Anxiety: 0 Nausea: 0   Anorexia: 9 Dyspnea: 0           Stool Occurrence(s): 1        PHYSICAL EXAM:     From RN flowsheet:  Wt Readings from Last 3 Encounters:   12/20/17 118 lb 9.7 oz (53.8 kg)   04/01/17 123 lb (55.8 kg)   11/11/16 118 lb (53.5 kg)     Blood pressure 151/90, pulse (!) 105, temperature 97.5 °F (36.4 °C), resp. rate 20, height 6' 1\" (1.854 m), weight 118 lb 9.7 oz (53.8 kg), SpO2 100 %.     Pain Scale 1: Adult Nonverbal Pain Scale  Pain Intensity 1: 0     Pain Location 1: Coccyx, Foot, Knee  Pain Orientation 1: Anterior, Posterior  Pain Description 1: Aching, Sharp  Pain Intervention(s) 1: Medication (see MAR)  Last bowel movement, if known:     Constitutional: frail, cachectic, nad  Eyes: pupils equal, anicteric  ENMT: no nasal discharge, moist mucous membranes  Cardiovascular: regular rhythm, distal pulses intact  Respiratory: breathing not labored, symmetric  Gastrointestinal: soft non-tender, +bowel sounds  Musculoskeletal: no deformity, no tenderness to palpation  Skin: warm, dry  Neurologic: following no commands, Parkinson's disease  Psychiatric:   Other:       HISTORY:     Principal Problem:    Weakness (12/15/2017)    Active Problems:    Parkinson disease ([facility] [address] (11/7/2016)      Past Medical History:   Diagnosis Date    CAD (coronary artery disease)     Hypercholesteremia     Osteoporosis     Parkinson disease ([facility] [address]       Past Surgical History:   Procedure Laterality Date    CARDIAC SURG PROCEDURE UNLIST      bipass x 2    HX PROSTATECTOMY        History reviewed. No pertinent family history. History reviewed, no pertinent family history.   Social History   Substance Use Topics    Smoking status: Former Smoker    Smokeless tobacco: Not on file    Alcohol use No     No Known Allergies   Current Facility-Administered Medications   Medication Dose Route Frequency    pantoprazole (PROTONIX) 2 mg/mL oral suspension 20 mg  20 mg Oral ACB    carbidopa-levodopa (SINEMET)  mg per tablet 1 Tab  1 Tab Oral QID    dextrose 5% with KCl 20 mEq/L infusion   IntraVENous CONTINUOUS    lidocaine (XYLOCAINE) 2 % jelly   Mucous Membrane PRN    thiamine (B-1) tablet 100 mg  100 mg Per NG tube DAILY    pimavanserin tab 34 mg (Patient Supplied)  34 mg Per NG tube DAILY    lactulose (CHRONULAC) solution 20 g  20 g Per NG tube BID    morphine injection 2 mg  2 mg IntraVENous Q4H PRN    aspirin chewable tablet 81 mg  81 mg Oral QHS    polyethylene glycol (MIRALAX) packet 17 g  17 g Oral QHS    latanoprost (XALATAN) 0.005 % ophthalmic solution 1 Drop  1 Drop Both Eyes QHS    sodium chloride (NS) flush 5-10 mL  5-10 mL IntraVENous Q8H    sodium chloride (NS) flush 5-10 mL  5-10 mL IntraVENous PRN    acetaminophen (TYLENOL) tablet 650 mg  650 mg Oral Q4H PRN    Or    acetaminophen (TYLENOL) solution 650 mg  650 mg Per NG tube Q4H PRN    Or    acetaminophen (TYLENOL) suppository 650 mg  650 mg Rectal Q4H PRN    rivastigmine (EXELON) 9.5 mg/24 hr patch 1 Patch  1 Patch TransDERmal DAILY          LAB AND IMAGING FINDINGS:     Lab Results   Component Value Date/Time    WBC 8.5 12/20/2017 03:31 AM    HGB 9.5 12/20/2017 03:31 AM    PLATELET 689 07/03/8068 03:31 AM     Lab Results   Component Value Date/Time    Sodium 142 12/20/2017 03:31 AM    Potassium 3.9 12/20/2017 03:31 AM    Chloride 108 12/20/2017 03:31 AM    CO2 26 12/20/2017 03:31 AM    BUN 9 12/20/2017 03:31 AM    Creatinine 0.70 12/20/2017 03:31 AM    Calcium 7.7 12/20/2017 03:31 AM    Magnesium 2.1 12/20/2017 03:31 AM    Phosphorus 1.5 12/20/2017 03:31 AM      Lab Results   Component Value Date/Time    AST (SGOT) 57 12/20/2017 03:31 AM    Alk. phosphatase 82 12/20/2017 03:31 AM    Protein, total 5.9 12/20/2017 03:31 AM    Albumin 1.6 12/20/2017 03:31 AM    Globulin 4.3 12/20/2017 03:31 AM     Lab Results   Component Value Date/Time    INR 1.1 07/12/2015 12:15 PM    Prothrombin time 10.8 07/12/2015 12:15 PM    aPTT 27.0 07/12/2015 12:15 PM      No results found for: IRON, FE, TIBC, IBCT, PSAT, FERR   No results found for: PH, PCO2, PO2  No components found for: GLPOC   No results found for: CPK, CKMB             Total time: 70 minutes  Counseling / coordination time, spent as noted above: 45 minutes  > 50% counseling / coordination?: y    Prolonged service was provided for  []30 min   []75 min in face to face time in the presence of the patient, spent as noted above. Time Start:   Time End:   Note: this can only be billed with 62007 (initial) or 10968 (follow up). If multiple start / stop times, list each separately.

## (undated) DEVICE — SET ADMIN 16ML TBNG L100IN 2 Y INJ SITE IV PIGGY BK DISP

## (undated) DEVICE — REM POLYHESIVE ADULT PATIENT RETURN ELECTRODE: Brand: VALLEYLAB

## (undated) DEVICE — DRAPE,LAPAROTOMY,T,PEDI,STERILE: Brand: MEDLINE

## (undated) DEVICE — 1200 GUARD II KIT W/5MM TUBE W/O VAC TUBE: Brand: GUARDIAN

## (undated) DEVICE — SYRINGE MED 20ML STD CLR PLAS LUERLOCK TIP N CTRL DISP

## (undated) DEVICE — KIT IV STRT W CHLORAPREP PD 1ML

## (undated) DEVICE — ABDOMINAL PAD: Brand: DERMACEA

## (undated) DEVICE — KENDALL SCD EXPRESS SLEEVES, KNEE LENGTH, MEDIUM: Brand: KENDALL SCD

## (undated) DEVICE — BITE BLK ENDOSCP AD 54FR GRN POLYETH ENDOSCP W STRP SLD

## (undated) DEVICE — SUTURE VCRL SZ 2-0 L27IN ABSRB VLT L26MM SH 1/2 CIR J317H

## (undated) DEVICE — KERLIX BANDAGE ROLL: Brand: KERLIX

## (undated) DEVICE — SOLUTION IV 1000ML 0.9% SOD CHL

## (undated) DEVICE — INFECTION CONTROL KIT SYS

## (undated) DEVICE — CATH KT GASTMY PEG PSH 20FR --

## (undated) DEVICE — 3M™ DURAPORE™ SURGICAL TAPE 1538-3, 3 INCH X 10 YARD (7,5CM X 9,1M), 4 ROLLS/BOX: Brand: 3M™ DURAPORE™

## (undated) DEVICE — CANN NASAL O2 CAPNOGRAPHY AD -- FILTERLINE

## (undated) DEVICE — KENDALL RADIOLUCENT FOAM MONITORING ELECTRODE -RECTANGULAR SHAPE: Brand: KENDALL

## (undated) DEVICE — NEONATAL-ADULT SPO2 SENSOR: Brand: NELLCOR

## (undated) DEVICE — Z DISCONTINUED NO SUB IDED SET EXTN W/ 4 W STPCOCK M SPIN LOK 36IN

## (undated) DEVICE — ENDO CARRY-ON PROCEDURE KIT INCLUDES ENZYMATIC SPONGE, GAUZE, BIOHAZARD LABEL, TRAY, LUBRICANT, DIRTY SCOPE LABEL, WATER LABEL, TRAY, DRAWSTRING PAD, AND DEFENDO 4-PIECE KIT.: Brand: ENDO CARRY-ON PROCEDURE KIT

## (undated) DEVICE — SURGICAL PROCEDURE PACK BASIN MAJ SET CUST NO CAUT

## (undated) DEVICE — SPONGE LAP 18X18IN STRL -- 5/PK

## (undated) DEVICE — DRESSING PETRO GZ XRFRM CURAD ST OVERWRAP 5 X 9 IN

## (undated) DEVICE — STERILE POLYISOPRENE POWDER-FREE SURGICAL GLOVES WITH EMOLLIENT COATING: Brand: PROTEXIS

## (undated) DEVICE — BAG BELONG PT PERS CLEAR HANDL

## (undated) DEVICE — SOLIDIFIER FLUID 3000 CC ABSORB

## (undated) DEVICE — TRAY PREP DRY W/ PREM GLV 2 APPL 6 SPNG 2 UNDPD 1 OVERWRAP

## (undated) DEVICE — BASIC PACK: Brand: CONVERTORS

## (undated) DEVICE — CATH IV AUTOGRD BC BLU 22GA 25 -- INSYTE

## (undated) DEVICE — STERILE LATEX POWDER-FREE SURGICAL GLOVESWITH NITRILE COATING: Brand: PROTEXIS

## (undated) DEVICE — BINDER ABD H12IN FOR 30-45IN WAIST UNIV 4 PNL PREM DSGN E

## (undated) DEVICE — NEEDLE HYPO 18GA L1.5IN PNK S STL HUB POLYPR SHLD REG BVL

## (undated) DEVICE — BW-412T DISP COMBO CLEANING BRUSH: Brand: SINGLE USE COMBINATION CLEANING BRUSH

## (undated) DEVICE — ROCKER SWITCH PENCIL BLADE ELECTRODE, HOLSTER: Brand: EDGE

## (undated) DEVICE — TOWEL SURG W17XL27IN STD BLU COT NONFENESTRATED PREWASHED

## (undated) DEVICE — DEVON™ KNEE AND BODY STRAP 60" X 3" (1.5 M X 7.6 CM): Brand: DEVON

## (undated) DEVICE — JELLY,LUBE,STERILE,FLIP TOP,TUBE,4-OZ: Brand: MEDLINE